# Patient Record
Sex: FEMALE | Race: WHITE | Employment: PART TIME | URBAN - METROPOLITAN AREA
[De-identification: names, ages, dates, MRNs, and addresses within clinical notes are randomized per-mention and may not be internally consistent; named-entity substitution may affect disease eponyms.]

---

## 2017-03-23 ENCOUNTER — LAB ENCOUNTER (OUTPATIENT)
Dept: LAB | Age: 27
End: 2017-03-23
Attending: INTERNAL MEDICINE
Payer: COMMERCIAL

## 2017-03-23 ENCOUNTER — OFFICE VISIT (OUTPATIENT)
Dept: FAMILY MEDICINE CLINIC | Facility: CLINIC | Age: 27
End: 2017-03-23

## 2017-03-23 VITALS
WEIGHT: 230 LBS | SYSTOLIC BLOOD PRESSURE: 120 MMHG | BODY MASS INDEX: 41.79 KG/M2 | DIASTOLIC BLOOD PRESSURE: 82 MMHG | HEART RATE: 88 BPM | HEIGHT: 62.25 IN | RESPIRATION RATE: 16 BRPM | TEMPERATURE: 99 F

## 2017-03-23 DIAGNOSIS — N92.6 IRREGULAR MENSTRUAL CYCLE: ICD-10-CM

## 2017-03-23 DIAGNOSIS — R13.10 DYSPHAGIA, UNSPECIFIED TYPE: Primary | ICD-10-CM

## 2017-03-23 DIAGNOSIS — L03.116 CELLULITIS OF LEFT LOWER EXTREMITY: ICD-10-CM

## 2017-03-23 DIAGNOSIS — Z01.89 ROUTINE LAB DRAW: ICD-10-CM

## 2017-03-23 LAB
ALBUMIN SERPL-MCNC: 3.8 G/DL (ref 3.5–4.8)
ALP LIVER SERPL-CCNC: 68 U/L (ref 37–98)
ALT SERPL-CCNC: 34 U/L (ref 14–54)
AST SERPL-CCNC: 23 U/L (ref 15–41)
BASOPHILS # BLD AUTO: 0.03 X10(3) UL (ref 0–0.1)
BASOPHILS NFR BLD AUTO: 0.3 %
BILIRUB SERPL-MCNC: 0.4 MG/DL (ref 0.1–2)
BUN BLD-MCNC: 13 MG/DL (ref 8–20)
CALCIUM BLD-MCNC: 9.3 MG/DL (ref 8.3–10.3)
CHLORIDE: 105 MMOL/L (ref 101–111)
CHOLEST SMN-MCNC: 168 MG/DL (ref ?–190)
CO2: 27 MMOL/L (ref 22–32)
CREAT BLD-MCNC: 0.85 MG/DL (ref 0.55–1.02)
EOSINOPHIL # BLD AUTO: 0.58 X10(3) UL (ref 0–0.3)
EOSINOPHIL NFR BLD AUTO: 5.8 %
ERYTHROCYTE [DISTWIDTH] IN BLOOD BY AUTOMATED COUNT: 13.6 % (ref 11.5–16)
FREE T4: 0.9 NG/DL (ref 0.9–1.8)
GLUCOSE BLD-MCNC: 83 MG/DL (ref 70–99)
HCT VFR BLD AUTO: 36.8 % (ref 34–50)
HDLC SERPL-MCNC: 46 MG/DL (ref 45–?)
HDLC SERPL: 3.65 {RATIO} (ref ?–4.44)
HGB BLD-MCNC: 12.8 G/DL (ref 12–16)
IMMATURE GRANULOCYTE COUNT: 0.04 X10(3) UL (ref 0–1)
IMMATURE GRANULOCYTE RATIO %: 0.4 %
LDLC SERPL CALC-MCNC: 85 MG/DL (ref ?–120)
LYMPHOCYTES # BLD AUTO: 3.2 X10(3) UL (ref 0.9–4)
LYMPHOCYTES NFR BLD AUTO: 32 %
M PROTEIN MFR SERPL ELPH: 7.4 G/DL (ref 6.1–8.3)
MCH RBC QN AUTO: 29.7 PG (ref 27–33.2)
MCHC RBC AUTO-ENTMCNC: 34.8 G/DL (ref 31–37)
MCV RBC AUTO: 85.4 FL (ref 81–100)
MONOCYTES # BLD AUTO: 0.63 X10(3) UL (ref 0.1–0.6)
MONOCYTES NFR BLD AUTO: 6.3 %
NEUTROPHIL ABS PRELIM: 5.52 X10 (3) UL (ref 1.3–6.7)
NEUTROPHILS # BLD AUTO: 5.52 X10(3) UL (ref 1.3–6.7)
NEUTROPHILS NFR BLD AUTO: 55.2 %
NONHDLC SERPL-MCNC: 122 MG/DL (ref ?–150)
PLATELET # BLD AUTO: 293 10(3)UL (ref 150–450)
POTASSIUM SERPL-SCNC: 4.1 MMOL/L (ref 3.6–5.1)
RBC # BLD AUTO: 4.31 X10(6)UL (ref 3.8–5.1)
RED CELL DISTRIBUTION WIDTH-SD: 42.2 FL (ref 35.1–46.3)
SODIUM SERPL-SCNC: 139 MMOL/L (ref 136–144)
TRIGLYCERIDES: 187 MG/DL (ref ?–115)
TSI SER-ACNC: 1.86 MIU/ML (ref 0.35–5.5)
VLDL: 37 MG/DL (ref 5–40)
WBC # BLD AUTO: 10 X10(3) UL (ref 4–13)

## 2017-03-23 PROCEDURE — 99204 OFFICE O/P NEW MOD 45 MIN: CPT | Performed by: PHYSICIAN ASSISTANT

## 2017-03-23 PROCEDURE — 84443 ASSAY THYROID STIM HORMONE: CPT

## 2017-03-23 PROCEDURE — 80061 LIPID PANEL: CPT

## 2017-03-23 PROCEDURE — 36415 COLL VENOUS BLD VENIPUNCTURE: CPT

## 2017-03-23 PROCEDURE — 84439 ASSAY OF FREE THYROXINE: CPT

## 2017-03-23 PROCEDURE — 85025 COMPLETE CBC W/AUTO DIFF WBC: CPT

## 2017-03-23 PROCEDURE — 80053 COMPREHEN METABOLIC PANEL: CPT

## 2017-03-23 RX ORDER — CLINDAMYCIN HYDROCHLORIDE 300 MG/1
300 CAPSULE ORAL 3 TIMES DAILY
Qty: 30 CAPSULE | Refills: 0 | Status: SHIPPED | OUTPATIENT
Start: 2017-03-23 | End: 2017-06-26

## 2017-03-23 NOTE — PATIENT INSTRUCTIONS
Thank you for choosing Bernardo Barrett PA-C at [place] [address]  To Do: Hal Pope  1. Pt to begin medications as prescribed, heat to area, benadryl and topical hydrocortisone cream  2. Get imaging and lab work done  3.  Pt to follow-up with gyneco stop treatment if problems arise, but know that our intention is that the benefits outweigh those potential risks and we strive to make you healthier and to improve your quality of life.     Referrals, and Radiology Information:    If your insurance require

## 2017-04-19 ENCOUNTER — APPOINTMENT (OUTPATIENT)
Dept: LAB | Age: 27
End: 2017-04-19
Attending: OBSTETRICS & GYNECOLOGY
Payer: COMMERCIAL

## 2017-04-19 ENCOUNTER — OFFICE VISIT (OUTPATIENT)
Dept: OBGYN CLINIC | Facility: CLINIC | Age: 27
End: 2017-04-19

## 2017-04-19 VITALS — BODY MASS INDEX: 41 KG/M2 | WEIGHT: 228 LBS

## 2017-04-19 DIAGNOSIS — N91.2 AMENORRHEA DUE TO DEPO PROVERA: ICD-10-CM

## 2017-04-19 DIAGNOSIS — N92.6 IRREGULAR MENSTRUAL CYCLE: Primary | ICD-10-CM

## 2017-04-19 DIAGNOSIS — Z31.9 PATIENT DESIRES PREGNANCY: ICD-10-CM

## 2017-04-19 DIAGNOSIS — N92.6 IRREGULAR MENSTRUAL CYCLE: ICD-10-CM

## 2017-04-19 PROCEDURE — 83001 ASSAY OF GONADOTROPIN (FSH): CPT | Performed by: OBSTETRICS & GYNECOLOGY

## 2017-04-19 PROCEDURE — 84144 ASSAY OF PROGESTERONE: CPT | Performed by: OBSTETRICS & GYNECOLOGY

## 2017-04-19 PROCEDURE — 99213 OFFICE O/P EST LOW 20 MIN: CPT | Performed by: OBSTETRICS & GYNECOLOGY

## 2017-04-19 PROCEDURE — 84146 ASSAY OF PROLACTIN: CPT | Performed by: OBSTETRICS & GYNECOLOGY

## 2017-04-19 PROCEDURE — 83002 ASSAY OF GONADOTROPIN (LH): CPT | Performed by: OBSTETRICS & GYNECOLOGY

## 2017-04-19 PROCEDURE — 36415 COLL VENOUS BLD VENIPUNCTURE: CPT | Performed by: OBSTETRICS & GYNECOLOGY

## 2017-04-19 PROCEDURE — 82670 ASSAY OF TOTAL ESTRADIOL: CPT | Performed by: OBSTETRICS & GYNECOLOGY

## 2017-04-19 NOTE — PROGRESS NOTES
Houston Ploo is a 27year old female. HPI:   Patient presents with:  Gyn Problem: Irregular Periods/Trying to Conceive      Pt took depo provera after delivery of first child. Last dose June 2016, but did not have a menses until Jan 2017.  Then had irre MD

## 2017-04-20 NOTE — PROGRESS NOTES
Quick Note:    Patient notified of normal results. Discussed progestational effects of Depo and how it can take 1 year for some women to menstruate after D/C drug.  Advised patient to observe through summer and if menses still not returning regularly to f/u

## 2017-06-26 ENCOUNTER — OFFICE VISIT (OUTPATIENT)
Dept: OBGYN CLINIC | Facility: CLINIC | Age: 27
End: 2017-06-26

## 2017-06-26 VITALS
BODY MASS INDEX: 40.55 KG/M2 | HEIGHT: 62.5 IN | SYSTOLIC BLOOD PRESSURE: 110 MMHG | WEIGHT: 226 LBS | DIASTOLIC BLOOD PRESSURE: 74 MMHG | HEART RATE: 87 BPM

## 2017-06-26 DIAGNOSIS — Z12.4 CERVICAL CANCER SCREENING: ICD-10-CM

## 2017-06-26 DIAGNOSIS — N92.6 IRREGULAR MENSES: ICD-10-CM

## 2017-06-26 DIAGNOSIS — Z01.419 WELL FEMALE EXAM WITH ROUTINE GYNECOLOGICAL EXAM: Primary | ICD-10-CM

## 2017-06-26 PROCEDURE — 88175 CYTOPATH C/V AUTO FLUID REDO: CPT | Performed by: OBSTETRICS & GYNECOLOGY

## 2017-06-26 PROCEDURE — 99395 PREV VISIT EST AGE 18-39: CPT | Performed by: OBSTETRICS & GYNECOLOGY

## 2017-06-26 PROCEDURE — 81025 URINE PREGNANCY TEST: CPT | Performed by: OBSTETRICS & GYNECOLOGY

## 2017-06-26 NOTE — PROGRESS NOTES
GYN H&P     2017  6:26 PM    CC: Patient is here for annual    HPI: patient is a 27year old  here for her annual gyn exam.   She has no complaints. Menses are regular, except late this month. Requests ucg.  Denies any pelvic pain or irregular v epistaxis, throat pain or sore throat  Respiratory: denies SOB, dyspnea, cough or wheezing  Cardiovascular: denies chest pain, palpitations, exercise intolerance   GI: denies abdominal pain, diarrhea, constipation  : no complaints, denies dysuria, increa Rectal: deferred  EXTREMITIES:  non tender without edema        A/P: Patient is 27year old female with no complaints. Here for well woman exam.   1. Well female exam with routine gynecological exam    2. Cervical cancer screening    3.  Irregular menses

## 2017-07-15 ENCOUNTER — HOSPITAL ENCOUNTER (EMERGENCY)
Age: 27
Discharge: HOME | End: 2017-07-15
Attending: EMERGENCY MEDICINE
Payer: COMMERCIAL

## 2017-07-15 ENCOUNTER — APPOINTMENT (OUTPATIENT)
Dept: GENERAL RADIOLOGY | Age: 27
End: 2017-07-15
Attending: PHYSICIAN ASSISTANT
Payer: COMMERCIAL

## 2017-07-15 VITALS
HEART RATE: 105 BPM | RESPIRATION RATE: 22 BRPM | HEIGHT: 63 IN | TEMPERATURE: 99 F | OXYGEN SATURATION: 99 % | SYSTOLIC BLOOD PRESSURE: 129 MMHG | WEIGHT: 220 LBS | DIASTOLIC BLOOD PRESSURE: 68 MMHG | BODY MASS INDEX: 38.98 KG/M2

## 2017-07-15 DIAGNOSIS — J06.9 UPPER RESPIRATORY TRACT INFECTION, UNSPECIFIED TYPE: Primary | ICD-10-CM

## 2017-07-15 PROCEDURE — 71020 XR CHEST PA + LAT CHEST (CPT=71020): CPT | Performed by: PHYSICIAN ASSISTANT

## 2017-07-15 PROCEDURE — 87081 CULTURE SCREEN ONLY: CPT | Performed by: PHYSICIAN ASSISTANT

## 2017-07-15 PROCEDURE — 87430 STREP A AG IA: CPT | Performed by: PHYSICIAN ASSISTANT

## 2017-07-15 PROCEDURE — 99283 EMERGENCY DEPT VISIT LOW MDM: CPT

## 2017-07-15 PROCEDURE — 96372 THER/PROPH/DIAG INJ SC/IM: CPT

## 2017-07-15 RX ORDER — DEXAMETHASONE SODIUM PHOSPHATE 4 MG/ML
8 VIAL (ML) INJECTION ONCE
Status: COMPLETED | OUTPATIENT
Start: 2017-07-15 | End: 2017-07-15

## 2017-07-15 RX ORDER — ACETAMINOPHEN 500 MG
1000 TABLET ORAL ONCE
Status: COMPLETED | OUTPATIENT
Start: 2017-07-15 | End: 2017-07-15

## 2017-07-16 NOTE — ED PROVIDER NOTES
Patient Seen in: [facility] In [place]    History   Patient presents with:  Sore Throat    Stated Complaint: PT states she is having a sore throat.     HPI    68-year-old female with a history of asthma presents to the ER for evaluatio normal.   Mouth/Throat: Mucous membranes are not dry. No oropharyngeal exudate or posterior oropharyngeal erythema. Eyes: Conjunctivae and EOM are normal. Right eye exhibits no discharge and no exudate. Left eye exhibits no discharge and no exudate.    Ne supportive care at home. Follow-up with primary care in 2-3 days.       Disposition and Plan     Clinical Impression:  Upper respiratory tract infection, unspecified type  (primary encounter diagnosis)    Disposition:  Discharge    Follow-up:  Brigido Kessler

## 2017-07-16 NOTE — ED PROVIDER NOTES
I reviewed that chart and discussed the case. I have examined the patient and noted H EENT exam is normal.      I agree with the following clinical impression(s):  Upper respiratory tract infection, unspecified type  (primary encounter diagnosis).     I ag

## 2017-10-04 ENCOUNTER — APPOINTMENT (OUTPATIENT)
Dept: GENERAL RADIOLOGY | Age: 27
End: 2017-10-04
Attending: NURSE PRACTITIONER
Payer: COMMERCIAL

## 2017-10-04 ENCOUNTER — HOSPITAL ENCOUNTER (EMERGENCY)
Age: 27
Discharge: HOME | End: 2017-10-04
Payer: COMMERCIAL

## 2017-10-04 VITALS
WEIGHT: 235 LBS | HEIGHT: 63 IN | SYSTOLIC BLOOD PRESSURE: 135 MMHG | HEART RATE: 100 BPM | RESPIRATION RATE: 18 BRPM | TEMPERATURE: 98 F | DIASTOLIC BLOOD PRESSURE: 74 MMHG | BODY MASS INDEX: 41.64 KG/M2 | OXYGEN SATURATION: 100 %

## 2017-10-04 DIAGNOSIS — S93.402A SPRAIN OF LEFT ANKLE, UNSPECIFIED LIGAMENT, INITIAL ENCOUNTER: Primary | ICD-10-CM

## 2017-10-04 PROCEDURE — 99283 EMERGENCY DEPT VISIT LOW MDM: CPT

## 2017-10-04 PROCEDURE — 73610 X-RAY EXAM OF ANKLE: CPT | Performed by: NURSE PRACTITIONER

## 2017-10-04 RX ORDER — IBUPROFEN 600 MG/1
600 TABLET ORAL ONCE
Status: COMPLETED | OUTPATIENT
Start: 2017-10-04 | End: 2017-10-04

## 2017-10-04 NOTE — ED PROVIDER NOTES
Patient had an ankle injury while on duty at work. She complains of pain over the lateral malleolus. She has eczema on her toes with some redness and scaling. It is very itchy.     On inspection, there is what appears to be fungal infection on the second

## 2017-10-04 NOTE — ED PROVIDER NOTES
Patient Seen in: THE [facility] In [place]    History   Patient presents with:  Lower Extremity Injury (musculoskeletal)    Stated Complaint: Left ankle injury    61-year-old female who presents to the emergency room with complaints of le All other systems reviewed and negative except as noted above. PSFH elements reviewed from today and agreed except as otherwise stated in HPI.     Physical Exam   ED Triage Vitals [10/04/17 1113]  BP: 135/74  Pulse: 100  Resp: 18  Temp: 98.4 °F (36.9 °C) CONCLUSION:  No acute findings.     Dictated by: Brain Roman MD on 10/04/2017 at 11:43     Approved by: Brain Roman MD          We will give patient crutches, and a ankle splint  ============================================================  ED Cou

## 2017-10-08 ENCOUNTER — HOSPITAL ENCOUNTER (EMERGENCY)
Age: 27
Discharge: HOME | End: 2017-10-08
Attending: EMERGENCY MEDICINE
Payer: COMMERCIAL

## 2017-10-08 VITALS
RESPIRATION RATE: 18 BRPM | BODY MASS INDEX: 39.87 KG/M2 | DIASTOLIC BLOOD PRESSURE: 65 MMHG | HEART RATE: 102 BPM | TEMPERATURE: 99 F | OXYGEN SATURATION: 99 % | HEIGHT: 63 IN | SYSTOLIC BLOOD PRESSURE: 111 MMHG | WEIGHT: 225 LBS

## 2017-10-08 DIAGNOSIS — R11.2 NAUSEA VOMITING AND DIARRHEA: Primary | ICD-10-CM

## 2017-10-08 DIAGNOSIS — R19.7 NAUSEA VOMITING AND DIARRHEA: Primary | ICD-10-CM

## 2017-10-08 PROCEDURE — 80053 COMPREHEN METABOLIC PANEL: CPT | Performed by: EMERGENCY MEDICINE

## 2017-10-08 PROCEDURE — 81025 URINE PREGNANCY TEST: CPT

## 2017-10-08 PROCEDURE — 85025 COMPLETE CBC W/AUTO DIFF WBC: CPT | Performed by: EMERGENCY MEDICINE

## 2017-10-08 PROCEDURE — 96361 HYDRATE IV INFUSION ADD-ON: CPT

## 2017-10-08 PROCEDURE — 87086 URINE CULTURE/COLONY COUNT: CPT | Performed by: EMERGENCY MEDICINE

## 2017-10-08 PROCEDURE — 99284 EMERGENCY DEPT VISIT MOD MDM: CPT

## 2017-10-08 PROCEDURE — 81001 URINALYSIS AUTO W/SCOPE: CPT | Performed by: EMERGENCY MEDICINE

## 2017-10-08 PROCEDURE — 96374 THER/PROPH/DIAG INJ IV PUSH: CPT

## 2017-10-08 RX ORDER — DICYCLOMINE HCL 20 MG
20 TABLET ORAL 4 TIMES DAILY PRN
Qty: 20 TABLET | Refills: 0 | Status: SHIPPED | OUTPATIENT
Start: 2017-10-08 | End: 2018-05-25

## 2017-10-08 RX ORDER — IBUPROFEN 400 MG/1
400 TABLET ORAL EVERY 6 HOURS PRN
COMMUNITY
End: 2018-05-25

## 2017-10-08 RX ORDER — ONDANSETRON 2 MG/ML
4 INJECTION INTRAMUSCULAR; INTRAVENOUS ONCE
Status: COMPLETED | OUTPATIENT
Start: 2017-10-08 | End: 2017-10-08

## 2017-10-08 RX ORDER — ONDANSETRON 4 MG/1
4 TABLET, ORALLY DISINTEGRATING ORAL EVERY 4 HOURS PRN
Qty: 10 TABLET | Refills: 0 | Status: SHIPPED | OUTPATIENT
Start: 2017-10-08 | End: 2017-10-15

## 2017-10-08 NOTE — ED PROVIDER NOTES
Patient Seen in: THE [facility] In [place]    History   Patient presents with:  Nausea/Vomiting/Diarrhea (gastrointestinal)  Dizziness (neurologic)    Stated Complaint: VOMITING/DIARRHEA/DIZZINESS    HPI    Patient is a 17-year-old female pupils equal round reactive to light. Mouth normal, neck supple, no meningismus. LUNGS: Lungs clear to auscultation bilaterally. CARDIOVASCULAR: + S1-S2, regular rate and rhythm, no murmurs. BACK: No CVA tenderness, no midline bony tenderness.   ABDOMEN ROUTINE       ============================================================  ED Course  ------------------------------------------------------------  MDM   Patient was given IV fluids as well as Toradol and Zofran. Patient felt much improved.   Patient was

## 2018-05-16 ENCOUNTER — TELEPHONE (OUTPATIENT)
Dept: OBGYN CLINIC | Facility: CLINIC | Age: 28
End: 2018-05-16

## 2018-05-16 NOTE — TELEPHONE ENCOUNTER
Patient called back to reschedule NOB appt. Her LMP was 01/23/18. She had + UPT. Denies any questions or concerns. Patient previously did not have vehicle. Now, she does. Appointment scheduled. To call with any further questions or concerns.

## 2018-05-16 NOTE — TELEPHONE ENCOUNTER
PT calling and had an appt scheduled in March but had to cancel due to no transportation    Pt now has a car    Pt is calling back to schedule her NEW OB appt again    Please call

## 2018-05-21 ENCOUNTER — HOSPITAL ENCOUNTER (EMERGENCY)
Age: 28
Discharge: HOME | End: 2018-05-21
Attending: EMERGENCY MEDICINE
Payer: COMMERCIAL

## 2018-05-21 ENCOUNTER — APPOINTMENT (OUTPATIENT)
Dept: ULTRASOUND IMAGING | Age: 28
End: 2018-05-21
Attending: EMERGENCY MEDICINE
Payer: COMMERCIAL

## 2018-05-21 VITALS
RESPIRATION RATE: 16 BRPM | WEIGHT: 225 LBS | HEART RATE: 90 BPM | SYSTOLIC BLOOD PRESSURE: 114 MMHG | HEIGHT: 63 IN | BODY MASS INDEX: 39.87 KG/M2 | TEMPERATURE: 99 F | OXYGEN SATURATION: 100 % | DIASTOLIC BLOOD PRESSURE: 55 MMHG

## 2018-05-21 DIAGNOSIS — Z3A.16 16 WEEKS GESTATION OF PREGNANCY: ICD-10-CM

## 2018-05-21 DIAGNOSIS — R10.9 FLANK PAIN: Primary | ICD-10-CM

## 2018-05-21 DIAGNOSIS — K80.20 CALCULUS OF GALLBLADDER WITHOUT CHOLECYSTITIS WITHOUT OBSTRUCTION: ICD-10-CM

## 2018-05-21 PROCEDURE — 87086 URINE CULTURE/COLONY COUNT: CPT | Performed by: EMERGENCY MEDICINE

## 2018-05-21 PROCEDURE — 85025 COMPLETE CBC W/AUTO DIFF WBC: CPT | Performed by: EMERGENCY MEDICINE

## 2018-05-21 PROCEDURE — 76815 OB US LIMITED FETUS(S): CPT | Performed by: EMERGENCY MEDICINE

## 2018-05-21 PROCEDURE — 80053 COMPREHEN METABOLIC PANEL: CPT | Performed by: EMERGENCY MEDICINE

## 2018-05-21 PROCEDURE — 76700 US EXAM ABDOM COMPLETE: CPT | Performed by: EMERGENCY MEDICINE

## 2018-05-21 PROCEDURE — 81003 URINALYSIS AUTO W/O SCOPE: CPT | Performed by: EMERGENCY MEDICINE

## 2018-05-21 PROCEDURE — 96360 HYDRATION IV INFUSION INIT: CPT

## 2018-05-21 PROCEDURE — 99284 EMERGENCY DEPT VISIT MOD MDM: CPT

## 2018-05-21 PROCEDURE — 99285 EMERGENCY DEPT VISIT HI MDM: CPT

## 2018-05-21 RX ORDER — POTASSIUM CHLORIDE 750 MG/1
10 TABLET, EXTENDED RELEASE ORAL ONCE
Status: COMPLETED | OUTPATIENT
Start: 2018-05-21 | End: 2018-05-21

## 2018-05-21 NOTE — ED PROVIDER NOTES
Patient Seen in: [facility] In [place]    History   Patient presents with:  Pregnancy Issues (gynecologic)  Back Pain (musculoskeletal)  Bleeding (hematologic)    Stated Complaint: UPPER RIGHT SIDED BACK PAIN/HEMATURIA/16WEEKS PREG (A white, conjunctiva pink and moist.  Lids and lashes are normal.  Nose: Unremarkable  Back: Unremarkable on inspection without soft tissue swelling or bruising noted in the right flank area.   There is no CVA tenderness  Lungs: Clear to auscultation bilatera 2016  ------------------------------------------------------------      MDM   Patient has flank pain with possible \"dots\" of blood. No heavy vaginal bleeding.     I am hesitant to perform a speculum examination and a second trimester pregnant patient   p visit          Medications Prescribed:  Current Discharge Medication List

## 2018-05-21 NOTE — ED INITIAL ASSESSMENT (HPI)
C/O RIGHT MID BACK PAIN X2 DAYS  HEMATURIA STARTING TODAY  16 WEEKS GESTATION   G 3 P 1 A 1  LMP 1/23/18  ERNESTINA 10/31/18

## 2018-05-22 NOTE — TELEPHONE ENCOUNTER
Tried to contact patient. She was seen in ER on 05/21/18. NEW OB scheduled for 06/08/18. Per Dr. Marlene Gavin patient should be seen for ER f/u sooner. Unable to leave  b/c mailbox was full.

## 2018-05-25 ENCOUNTER — LAB ENCOUNTER (OUTPATIENT)
Dept: LAB | Age: 28
End: 2018-05-25
Attending: OBSTETRICS & GYNECOLOGY
Payer: COMMERCIAL

## 2018-05-25 ENCOUNTER — OFFICE VISIT (OUTPATIENT)
Dept: OBGYN CLINIC | Facility: CLINIC | Age: 28
End: 2018-05-25

## 2018-05-25 VITALS
SYSTOLIC BLOOD PRESSURE: 110 MMHG | DIASTOLIC BLOOD PRESSURE: 58 MMHG | BODY MASS INDEX: 36.6 KG/M2 | TEMPERATURE: 98 F | WEIGHT: 204 LBS | HEIGHT: 62.5 IN | HEART RATE: 105 BPM

## 2018-05-25 DIAGNOSIS — N93.9 VAGINAL SPOTTING: Primary | ICD-10-CM

## 2018-05-25 DIAGNOSIS — Z34.92 ENCOUNTER FOR SUPERVISION OF NORMAL PREGNANCY IN SECOND TRIMESTER, UNSPECIFIED GRAVIDITY: ICD-10-CM

## 2018-05-25 DIAGNOSIS — M54.9 ACUTE RIGHT-SIDED BACK PAIN, UNSPECIFIED BACK LOCATION: ICD-10-CM

## 2018-05-25 PROCEDURE — 87086 URINE CULTURE/COLONY COUNT: CPT | Performed by: OBSTETRICS & GYNECOLOGY

## 2018-05-25 PROCEDURE — 86900 BLOOD TYPING SEROLOGIC ABO: CPT | Performed by: OBSTETRICS & GYNECOLOGY

## 2018-05-25 PROCEDURE — 87491 CHLMYD TRACH DNA AMP PROBE: CPT | Performed by: OBSTETRICS & GYNECOLOGY

## 2018-05-25 PROCEDURE — 86850 RBC ANTIBODY SCREEN: CPT | Performed by: OBSTETRICS & GYNECOLOGY

## 2018-05-25 PROCEDURE — 81002 URINALYSIS NONAUTO W/O SCOPE: CPT | Performed by: OBSTETRICS & GYNECOLOGY

## 2018-05-25 PROCEDURE — 36415 COLL VENOUS BLD VENIPUNCTURE: CPT | Performed by: OBSTETRICS & GYNECOLOGY

## 2018-05-25 PROCEDURE — 86901 BLOOD TYPING SEROLOGIC RH(D): CPT | Performed by: OBSTETRICS & GYNECOLOGY

## 2018-05-25 PROCEDURE — 99214 OFFICE O/P EST MOD 30 MIN: CPT | Performed by: OBSTETRICS & GYNECOLOGY

## 2018-05-25 PROCEDURE — 87389 HIV-1 AG W/HIV-1&-2 AB AG IA: CPT | Performed by: OBSTETRICS & GYNECOLOGY

## 2018-05-25 PROCEDURE — 87660 TRICHOMONAS VAGIN DIR PROBE: CPT | Performed by: OBSTETRICS & GYNECOLOGY

## 2018-05-25 PROCEDURE — 85025 COMPLETE CBC W/AUTO DIFF WBC: CPT | Performed by: OBSTETRICS & GYNECOLOGY

## 2018-05-25 PROCEDURE — 86780 TREPONEMA PALLIDUM: CPT | Performed by: OBSTETRICS & GYNECOLOGY

## 2018-05-25 PROCEDURE — 87480 CANDIDA DNA DIR PROBE: CPT | Performed by: OBSTETRICS & GYNECOLOGY

## 2018-05-25 PROCEDURE — 87591 N.GONORRHOEAE DNA AMP PROB: CPT | Performed by: OBSTETRICS & GYNECOLOGY

## 2018-05-25 PROCEDURE — 87510 GARDNER VAG DNA DIR PROBE: CPT | Performed by: OBSTETRICS & GYNECOLOGY

## 2018-05-25 PROCEDURE — 87340 HEPATITIS B SURFACE AG IA: CPT | Performed by: OBSTETRICS & GYNECOLOGY

## 2018-05-25 NOTE — PATIENT INSTRUCTIONS
Please report to the emergency department if you experience chills, fever, nausea, vomiting, pain with urination, blood in urine and/or increased frequency of urination.    Please keep your appointment on 06/08/18 to establish prenatal care

## 2018-05-25 NOTE — PROGRESS NOTES
[facility]  Obstetrics and Gynecology  Follow Up Progress Note    Subjective:     Houston Polo is a 24year old  female who presents for follow up from ED.  She had vaginal spotting that started on Monday morning and last noted prior to ED Ref Range & Units 5/21/18  6:02 PM    Urine Color Yellow Yellow    Clarity Urine Clear Turbid     Spec Gravity 1.001 - 1.030 1.020    Glucose Urine Negative mg/dl Negative    Bilirubin Urine Negative Negative    Ketones Urine Negative mg/dL Negative    Blo Mattie Rey is a 24year old  female who presents for follow up ED visit     Patient Active Problem List:     Encounter for supervision of normal first pregnancy in third trimester     Obesity affecting pregnancy, antepartum     Pregnancy     [place]

## 2018-12-11 ENCOUNTER — APPOINTMENT (OUTPATIENT)
Dept: ULTRASOUND IMAGING | Age: 28
End: 2018-12-11
Attending: EMERGENCY MEDICINE
Payer: COMMERCIAL

## 2018-12-11 ENCOUNTER — HOSPITAL ENCOUNTER (EMERGENCY)
Age: 28
Discharge: HOME | End: 2018-12-11
Attending: EMERGENCY MEDICINE
Payer: COMMERCIAL

## 2018-12-11 VITALS
BODY MASS INDEX: 35.08 KG/M2 | RESPIRATION RATE: 16 BRPM | HEART RATE: 88 BPM | TEMPERATURE: 98 F | DIASTOLIC BLOOD PRESSURE: 82 MMHG | WEIGHT: 198 LBS | OXYGEN SATURATION: 100 % | HEIGHT: 63 IN | SYSTOLIC BLOOD PRESSURE: 110 MMHG

## 2018-12-11 DIAGNOSIS — K64.9 HEMORRHOIDS, UNSPECIFIED HEMORRHOID TYPE: ICD-10-CM

## 2018-12-11 DIAGNOSIS — S30.1XXA CONTUSION OF ABDOMINAL WALL, INITIAL ENCOUNTER: Primary | ICD-10-CM

## 2018-12-11 PROCEDURE — 99285 EMERGENCY DEPT VISIT HI MDM: CPT

## 2018-12-11 PROCEDURE — 99284 EMERGENCY DEPT VISIT MOD MDM: CPT

## 2018-12-11 PROCEDURE — 80048 BASIC METABOLIC PNL TOTAL CA: CPT | Performed by: EMERGENCY MEDICINE

## 2018-12-11 PROCEDURE — 85025 COMPLETE CBC W/AUTO DIFF WBC: CPT | Performed by: EMERGENCY MEDICINE

## 2018-12-11 PROCEDURE — 76815 OB US LIMITED FETUS(S): CPT | Performed by: EMERGENCY MEDICINE

## 2018-12-11 PROCEDURE — 86900 BLOOD TYPING SEROLOGIC ABO: CPT | Performed by: EMERGENCY MEDICINE

## 2018-12-11 PROCEDURE — 86901 BLOOD TYPING SEROLOGIC RH(D): CPT | Performed by: EMERGENCY MEDICINE

## 2018-12-11 PROCEDURE — 96360 HYDRATION IV INFUSION INIT: CPT

## 2018-12-11 RX ORDER — SODIUM CHLORIDE 9 MG/ML
125 INJECTION, SOLUTION INTRAVENOUS CONTINUOUS
Status: DISCONTINUED | OUTPATIENT
Start: 2018-12-11 | End: 2018-12-11

## 2018-12-11 RX ORDER — POTASSIUM CHLORIDE 750 MG/1
10 TABLET, EXTENDED RELEASE ORAL ONCE
Status: COMPLETED | OUTPATIENT
Start: 2018-12-11 | End: 2018-12-11

## 2018-12-11 NOTE — ED INITIAL ASSESSMENT (HPI)
Pt stated that fell earlier today while at the gas station, NO LOC; Pt also stated that she had a bowel movement and noticed blood in the toilet.

## 2018-12-11 NOTE — ED PROVIDER NOTES
Patient Seen in: [facility] In [place]    History   Patient presents with:  Trauma (cardiovascular, musculoskeletal)  Eval-G (gynecologic)  Pregnancy Issues (gynecologic)  Bleeding (hematologic)    Stated Complaint: STATES 16 WEEKS MN Vitals [12/11/18 1726]   /90   Pulse 94   Resp 16   Temp 98.1 °F (36.7 °C)   Temp src Oral   SpO2 100 %   O2 Device None (Room air)       Current:/82   Pulse 88   Temp 98.1 °F (36.7 °C) (Oral)   Resp 16   Ht 160 cm (5' 3\")   Wt 89.8 kg   LMP 0 tests on the individual orders. 82 Katie Martinez (BLOOD TYPE)                MDM   Patient believes her blood type is Rh+. Her abdominal examination is benign with no tenderness over the liver or spleen. Minimally tender diffusely across lower abdomen.   Rectal ex

## 2018-12-18 ENCOUNTER — HOSPITAL ENCOUNTER (EMERGENCY)
Age: 28
Discharge: HOME | End: 2018-12-18
Attending: EMERGENCY MEDICINE
Payer: COMMERCIAL

## 2018-12-18 VITALS
HEIGHT: 63 IN | BODY MASS INDEX: 34.55 KG/M2 | DIASTOLIC BLOOD PRESSURE: 66 MMHG | OXYGEN SATURATION: 100 % | HEART RATE: 76 BPM | TEMPERATURE: 98 F | WEIGHT: 195 LBS | RESPIRATION RATE: 20 BRPM | SYSTOLIC BLOOD PRESSURE: 135 MMHG

## 2018-12-18 DIAGNOSIS — O23.41 UTI (URINARY TRACT INFECTION) DURING PREGNANCY, FIRST TRIMESTER: Primary | ICD-10-CM

## 2018-12-18 PROCEDURE — 81001 URINALYSIS AUTO W/SCOPE: CPT | Performed by: EMERGENCY MEDICINE

## 2018-12-18 PROCEDURE — 87086 URINE CULTURE/COLONY COUNT: CPT | Performed by: EMERGENCY MEDICINE

## 2018-12-18 PROCEDURE — 99283 EMERGENCY DEPT VISIT LOW MDM: CPT

## 2018-12-18 RX ORDER — CEPHALEXIN 500 MG/1
500 CAPSULE ORAL 4 TIMES DAILY
Qty: 28 CAPSULE | Refills: 0 | Status: SHIPPED | OUTPATIENT
Start: 2018-12-18 | End: 2018-12-25

## 2018-12-18 NOTE — ED PROVIDER NOTES
Patient Seen in: [facility] In [place]    History   Patient presents with:  Urinary Symptoms (urologic)    Stated Complaint: UTI    HPI    61-year-old white female who presents emergency room today for complaint of urinary tract infec Heart is regular rate and rhythm without murmur gallop or rub    Abdomen is soft nondistended nontender to deep palpation there is no rebound or guarding noted no hepatosplenomegaly is noted. No masses are noted. No hernias are palpated.     Extremity exa

## 2019-01-20 ENCOUNTER — HOSPITAL ENCOUNTER (EMERGENCY)
Facility: HOSPITAL | Age: 29
Discharge: HOME | End: 2019-01-20
Attending: EMERGENCY MEDICINE
Payer: MEDICAID

## 2019-01-20 VITALS
DIASTOLIC BLOOD PRESSURE: 75 MMHG | OXYGEN SATURATION: 100 % | HEIGHT: 64 IN | RESPIRATION RATE: 19 BRPM | BODY MASS INDEX: 34.66 KG/M2 | TEMPERATURE: 98 F | WEIGHT: 203 LBS | HEART RATE: 93 BPM | SYSTOLIC BLOOD PRESSURE: 116 MMHG

## 2019-01-20 DIAGNOSIS — R51.9 ACUTE NONINTRACTABLE HEADACHE, UNSPECIFIED HEADACHE TYPE: Primary | ICD-10-CM

## 2019-01-20 LAB
ALBUMIN SERPL-MCNC: 3.5 G/DL (ref 3.1–4.5)
ALBUMIN/GLOB SERPL: 0.9 {RATIO} (ref 1–2)
ALP LIVER SERPL-CCNC: 69 U/L (ref 37–98)
ALT SERPL-CCNC: 21 U/L (ref 14–54)
ANION GAP SERPL CALC-SCNC: 7 MMOL/L (ref 0–18)
AST SERPL-CCNC: 14 U/L (ref 15–41)
BASOPHILS # BLD AUTO: 0.02 X10(3) UL (ref 0–0.1)
BASOPHILS NFR BLD AUTO: 0.2 %
BILIRUB SERPL-MCNC: 0.3 MG/DL (ref 0.1–2)
BILIRUB UR QL STRIP.AUTO: NEGATIVE
BUN BLD-MCNC: 11 MG/DL (ref 8–20)
BUN/CREAT SERPL: 13.8 (ref 10–20)
CALCIUM BLD-MCNC: 9.2 MG/DL (ref 8.3–10.3)
CHLORIDE SERPL-SCNC: 108 MMOL/L (ref 101–111)
CO2 SERPL-SCNC: 25 MMOL/L (ref 22–32)
COLOR UR AUTO: YELLOW
CREAT BLD-MCNC: 0.8 MG/DL (ref 0.55–1.02)
EOSINOPHIL # BLD AUTO: 0.18 X10(3) UL (ref 0–0.3)
EOSINOPHIL NFR BLD AUTO: 1.5 %
ERYTHROCYTE [DISTWIDTH] IN BLOOD BY AUTOMATED COUNT: 13.7 % (ref 11.5–16)
GLOBULIN PLAS-MCNC: 3.7 G/DL (ref 2.8–4.4)
GLUCOSE BLD-MCNC: 104 MG/DL (ref 70–99)
GLUCOSE UR STRIP.AUTO-MCNC: NEGATIVE MG/DL
HCT VFR BLD AUTO: 36.2 % (ref 34–50)
HGB BLD-MCNC: 13 G/DL (ref 12–16)
IMMATURE GRANULOCYTE COUNT: 0.04 X10(3) UL (ref 0–1)
IMMATURE GRANULOCYTE RATIO %: 0.3 %
LEUKOCYTE ESTERASE UR QL STRIP.AUTO: NEGATIVE
LYMPHOCYTES # BLD AUTO: 3.03 X10(3) UL (ref 0.9–4)
LYMPHOCYTES NFR BLD AUTO: 25.3 %
M PROTEIN MFR SERPL ELPH: 7.2 G/DL (ref 6.4–8.2)
MCH RBC QN AUTO: 32.2 PG (ref 27–33.2)
MCHC RBC AUTO-ENTMCNC: 35.9 G/DL (ref 31–37)
MCV RBC AUTO: 89.6 FL (ref 81–100)
MONOCYTES # BLD AUTO: 0.68 X10(3) UL (ref 0.1–1)
MONOCYTES NFR BLD AUTO: 5.7 %
NEUTROPHIL ABS PRELIM: 8.03 X10 (3) UL (ref 1.3–6.7)
NEUTROPHILS # BLD AUTO: 8.03 X10(3) UL (ref 1.3–6.7)
NEUTROPHILS NFR BLD AUTO: 67 %
NITRITE UR QL STRIP.AUTO: NEGATIVE
OSMOLALITY SERPL CALC.SUM OF ELEC: 290 MOSM/KG (ref 275–295)
PH UR STRIP.AUTO: 6 [PH] (ref 4.5–8)
PLATELET # BLD AUTO: 255 10(3)UL (ref 150–450)
POTASSIUM SERPL-SCNC: 3.2 MMOL/L (ref 3.6–5.1)
PROT UR STRIP.AUTO-MCNC: NEGATIVE MG/DL
RBC # BLD AUTO: 4.04 X10(6)UL (ref 3.8–5.1)
RBC UR QL AUTO: NEGATIVE
RED CELL DISTRIBUTION WIDTH-SD: 44.9 FL (ref 35.1–46.3)
SODIUM SERPL-SCNC: 140 MMOL/L (ref 136–144)
SP GR UR STRIP.AUTO: 1.01 (ref 1–1.03)
UROBILINOGEN UR STRIP.AUTO-MCNC: <2 MG/DL
WBC # BLD AUTO: 12 X10(3) UL (ref 4–13)

## 2019-01-20 PROCEDURE — 96375 TX/PRO/DX INJ NEW DRUG ADDON: CPT | Performed by: EMERGENCY MEDICINE

## 2019-01-20 PROCEDURE — 96361 HYDRATE IV INFUSION ADD-ON: CPT | Performed by: EMERGENCY MEDICINE

## 2019-01-20 PROCEDURE — 81003 URINALYSIS AUTO W/O SCOPE: CPT | Performed by: EMERGENCY MEDICINE

## 2019-01-20 PROCEDURE — 85025 COMPLETE CBC W/AUTO DIFF WBC: CPT | Performed by: EMERGENCY MEDICINE

## 2019-01-20 PROCEDURE — 80053 COMPREHEN METABOLIC PANEL: CPT | Performed by: EMERGENCY MEDICINE

## 2019-01-20 PROCEDURE — 99284 EMERGENCY DEPT VISIT MOD MDM: CPT | Performed by: EMERGENCY MEDICINE

## 2019-01-20 PROCEDURE — 96374 THER/PROPH/DIAG INJ IV PUSH: CPT | Performed by: EMERGENCY MEDICINE

## 2019-01-20 RX ORDER — DEXAMETHASONE SODIUM PHOSPHATE 4 MG/ML
10 VIAL (ML) INJECTION ONCE
Status: COMPLETED | OUTPATIENT
Start: 2019-01-20 | End: 2019-01-20

## 2019-01-20 RX ORDER — HYDROCODONE BITARTRATE AND ACETAMINOPHEN 5; 325 MG/1; MG/1
1-2 TABLET ORAL EVERY 4 HOURS PRN
Qty: 10 TABLET | Refills: 0 | Status: SHIPPED | OUTPATIENT
Start: 2019-01-20 | End: 2019-01-27

## 2019-01-20 RX ORDER — METOCLOPRAMIDE HYDROCHLORIDE 5 MG/ML
10 INJECTION INTRAMUSCULAR; INTRAVENOUS ONCE
Status: COMPLETED | OUTPATIENT
Start: 2019-01-20 | End: 2019-01-20

## 2019-01-20 RX ORDER — METOCLOPRAMIDE 10 MG/1
10 TABLET ORAL 3 TIMES DAILY PRN
Qty: 20 TABLET | Refills: 0 | Status: SHIPPED | OUTPATIENT
Start: 2019-01-20 | End: 2019-02-19

## 2019-01-20 RX ORDER — DIPHENHYDRAMINE HYDROCHLORIDE 50 MG/ML
50 INJECTION INTRAMUSCULAR; INTRAVENOUS ONCE
Status: COMPLETED | OUTPATIENT
Start: 2019-01-20 | End: 2019-01-20

## 2019-01-21 NOTE — ED INITIAL ASSESSMENT (HPI)
Patient sts migraine for the past few days- today feeling dizzy and nauseated. Pt sts 5 months pregnant. Also having stomach pains umbilical area. Denies vaginal bleeding.

## 2019-01-21 NOTE — ED PROVIDER NOTES
Patient Seen in: [facility] Emergency Department    History   Patient presents with:  Headache (neurologic)  Nausea/Vomiting/Diarrhea (gastrointestinal)    Stated Complaint: Migraine headache, nausea and vomiting, 5 months pregnant    HPI    28-year (36.9 °C) (Temporal)   Resp 17   Ht 162.6 cm (5' 4\")   Wt 92.1 kg   LMP 08/11/2018   SpO2 99%   BMI 34.84 kg/m²         Physical Exam    General appearance: This is a young adult female lying on a gurney. Vital signs were reviewed per nurse's notes.   MARCELLA RAINBOW DRAW GOLD   RAINBOW DRAW BLUE   RAINBOW DRAW LAVENDER   RAINBOW DRAW LIGHT GREEN          Intravenous access was obtained and the patient was treated with IV fluids, Reglan, Benadryl and Decadron. Initial blood pressure was 151/95.   CBC, chemi

## 2019-01-31 ENCOUNTER — ANESTHESIA (OUTPATIENT)
Dept: SURGERY | Facility: HOSPITAL | Age: 29
End: 2019-01-31

## 2019-01-31 ENCOUNTER — ANESTHESIA EVENT (OUTPATIENT)
Dept: SURGERY | Facility: HOSPITAL | Age: 29
End: 2019-01-31

## 2019-01-31 PROBLEM — Z34.90 PREGNANCY (HCC): Status: ACTIVE | Noted: 2019-01-31

## 2019-01-31 PROBLEM — Z34.90 PREGNANCY: Status: ACTIVE | Noted: 2019-01-31

## 2019-01-31 PROCEDURE — 0DTJ4ZZ RESECTION OF APPENDIX, PERCUTANEOUS ENDOSCOPIC APPROACH: ICD-10-PCS | Performed by: SURGERY

## 2019-01-31 PROCEDURE — 99254 IP/OBS CNSLTJ NEW/EST MOD 60: CPT | Performed by: SURGERY

## 2019-01-31 RX ORDER — LABETALOL HYDROCHLORIDE 5 MG/ML
5 INJECTION, SOLUTION INTRAVENOUS EVERY 5 MIN PRN
Status: DISCONTINUED | OUTPATIENT
Start: 2019-01-31 | End: 2019-02-01 | Stop reason: HOSPADM

## 2019-01-31 RX ORDER — SODIUM CHLORIDE 9 MG/ML
INJECTION, SOLUTION INTRAVENOUS CONTINUOUS
Status: DISCONTINUED | OUTPATIENT
Start: 2019-01-31 | End: 2019-02-01 | Stop reason: HOSPADM

## 2019-01-31 RX ORDER — NALOXONE HYDROCHLORIDE 0.4 MG/ML
80 INJECTION, SOLUTION INTRAMUSCULAR; INTRAVENOUS; SUBCUTANEOUS AS NEEDED
Status: DISCONTINUED | OUTPATIENT
Start: 2019-01-31 | End: 2019-02-01 | Stop reason: HOSPADM

## 2019-01-31 RX ORDER — ACETAMINOPHEN 10 MG/ML
1000 INJECTION, SOLUTION INTRAVENOUS ONCE
Status: DISCONTINUED | OUTPATIENT
Start: 2019-01-31 | End: 2019-02-02

## 2019-01-31 RX ORDER — BUPIVACAINE HYDROCHLORIDE AND EPINEPHRINE 5; 5 MG/ML; UG/ML
INJECTION, SOLUTION EPIDURAL; INTRACAUDAL; PERINEURAL AS NEEDED
Status: DISCONTINUED | OUTPATIENT
Start: 2019-01-31 | End: 2019-02-01 | Stop reason: HOSPADM

## 2019-01-31 RX ORDER — METOCLOPRAMIDE HYDROCHLORIDE 5 MG/ML
10 INJECTION INTRAMUSCULAR; INTRAVENOUS AS NEEDED
Status: DISCONTINUED | OUTPATIENT
Start: 2019-01-31 | End: 2019-02-01 | Stop reason: HOSPADM

## 2019-01-31 RX ORDER — ACETAMINOPHEN 10 MG/ML
INJECTION, SOLUTION INTRAVENOUS
Status: DISCONTINUED | OUTPATIENT
Start: 2019-01-31 | End: 2019-02-01 | Stop reason: HOSPADM

## 2019-01-31 RX ORDER — SODIUM CHLORIDE, SODIUM LACTATE, POTASSIUM CHLORIDE, CALCIUM CHLORIDE 600; 310; 30; 20 MG/100ML; MG/100ML; MG/100ML; MG/100ML
INJECTION, SOLUTION INTRAVENOUS CONTINUOUS
Status: DISCONTINUED | OUTPATIENT
Start: 2019-01-31 | End: 2019-02-02

## 2019-01-31 RX ORDER — ONDANSETRON 2 MG/ML
4 INJECTION INTRAMUSCULAR; INTRAVENOUS AS NEEDED
Status: DISCONTINUED | OUTPATIENT
Start: 2019-01-31 | End: 2019-02-01 | Stop reason: HOSPADM

## 2019-01-31 RX ORDER — HEPARIN SODIUM 5000 [USP'U]/ML
5000 INJECTION, SOLUTION INTRAVENOUS; SUBCUTANEOUS ONCE
Status: DISCONTINUED | OUTPATIENT
Start: 2019-01-31 | End: 2019-01-31 | Stop reason: HOSPADM

## 2019-01-31 RX ORDER — DIPHENHYDRAMINE HYDROCHLORIDE 50 MG/ML
12.5 INJECTION INTRAMUSCULAR; INTRAVENOUS AS NEEDED
Status: DISCONTINUED | OUTPATIENT
Start: 2019-01-31 | End: 2019-02-01 | Stop reason: HOSPADM

## 2019-01-31 RX ORDER — CEFOXITIN 2 G/1
INJECTION, POWDER, FOR SOLUTION INTRAVENOUS
Status: DISCONTINUED | OUTPATIENT
Start: 2019-01-31 | End: 2019-02-01 | Stop reason: HOSPADM

## 2019-02-01 ENCOUNTER — HOSPITAL ENCOUNTER (INPATIENT)
Facility: HOSPITAL | Age: 29
LOS: 1 days | Discharge: HOME | DRG: 832 | End: 2019-02-02
Attending: OBSTETRICS & GYNECOLOGY | Admitting: OBSTETRICS & GYNECOLOGY
Payer: MEDICAID

## 2019-02-01 DIAGNOSIS — K35.80 ACUTE APPENDICITIS: ICD-10-CM

## 2019-02-01 PROCEDURE — 44970 LAPAROSCOPY APPENDECTOMY: CPT | Performed by: SURGERY

## 2019-02-01 RX ORDER — CHOLECALCIFEROL (VITAMIN D3) 25 MCG
1 TABLET,CHEWABLE ORAL DAILY
Status: DISCONTINUED | OUTPATIENT
Start: 2019-02-02 | End: 2019-02-02

## 2019-02-01 RX ORDER — SODIUM PHOSPHATE, DIBASIC AND SODIUM PHOSPHATE, MONOBASIC 7; 19 G/133ML; G/133ML
1 ENEMA RECTAL ONCE AS NEEDED
Status: DISCONTINUED | OUTPATIENT
Start: 2019-02-01 | End: 2019-02-02

## 2019-02-01 RX ORDER — MORPHINE SULFATE 4 MG/ML
8 INJECTION, SOLUTION INTRAMUSCULAR; INTRAVENOUS EVERY 2 HOUR PRN
Status: DISCONTINUED | OUTPATIENT
Start: 2019-02-01 | End: 2019-02-02

## 2019-02-01 RX ORDER — DOCUSATE SODIUM 100 MG/1
100 CAPSULE, LIQUID FILLED ORAL 2 TIMES DAILY
Status: DISCONTINUED | OUTPATIENT
Start: 2019-02-01 | End: 2019-02-02

## 2019-02-01 RX ORDER — HEPARIN SODIUM 5000 [USP'U]/ML
5000 INJECTION, SOLUTION INTRAVENOUS; SUBCUTANEOUS EVERY 8 HOURS SCHEDULED
Status: DISCONTINUED | OUTPATIENT
Start: 2019-02-01 | End: 2019-02-01

## 2019-02-01 RX ORDER — HEPARIN SODIUM 5000 [USP'U]/ML
5000 INJECTION, SOLUTION INTRAVENOUS; SUBCUTANEOUS EVERY 12 HOURS SCHEDULED
Status: DISCONTINUED | OUTPATIENT
Start: 2019-02-02 | End: 2019-02-02

## 2019-02-01 RX ORDER — HYDROCODONE BITARTRATE AND ACETAMINOPHEN 5; 325 MG/1; MG/1
1 TABLET ORAL EVERY 4 HOURS PRN
Status: DISCONTINUED | OUTPATIENT
Start: 2019-02-01 | End: 2019-02-02

## 2019-02-01 RX ORDER — PSEUDOEPHEDRINE HCL 30 MG
100 TABLET ORAL 2 TIMES DAILY
Qty: 30 CAPSULE | Refills: 0 | Status: SHIPPED | OUTPATIENT
Start: 2019-02-01 | End: 2019-05-09

## 2019-02-01 RX ORDER — MORPHINE SULFATE 4 MG/ML
2 INJECTION, SOLUTION INTRAMUSCULAR; INTRAVENOUS EVERY 2 HOUR PRN
Status: DISCONTINUED | OUTPATIENT
Start: 2019-02-01 | End: 2019-02-02

## 2019-02-01 RX ORDER — HYDROCODONE BITARTRATE AND ACETAMINOPHEN 5; 325 MG/1; MG/1
2 TABLET ORAL EVERY 4 HOURS PRN
Status: DISCONTINUED | OUTPATIENT
Start: 2019-02-01 | End: 2019-02-02

## 2019-02-01 RX ORDER — ACETAMINOPHEN 325 MG/1
325 TABLET ORAL EVERY 6 HOURS PRN
Status: DISCONTINUED | OUTPATIENT
Start: 2019-02-01 | End: 2019-02-02

## 2019-02-01 RX ORDER — BISACODYL 10 MG
10 SUPPOSITORY, RECTAL RECTAL
Status: DISCONTINUED | OUTPATIENT
Start: 2019-02-01 | End: 2019-02-02

## 2019-02-01 RX ORDER — MORPHINE SULFATE 4 MG/ML
4 INJECTION, SOLUTION INTRAMUSCULAR; INTRAVENOUS EVERY 2 HOUR PRN
Status: DISCONTINUED | OUTPATIENT
Start: 2019-02-01 | End: 2019-02-02

## 2019-02-01 RX ORDER — ONDANSETRON 2 MG/ML
4 INJECTION INTRAMUSCULAR; INTRAVENOUS EVERY 6 HOURS PRN
Status: DISCONTINUED | OUTPATIENT
Start: 2019-02-01 | End: 2019-02-02

## 2019-02-01 RX ORDER — HYDROCODONE BITARTRATE AND ACETAMINOPHEN 5; 325 MG/1; MG/1
1 TABLET ORAL EVERY 4 HOURS PRN
Qty: 20 TABLET | Refills: 0 | Status: SHIPPED | OUTPATIENT
Start: 2019-02-01 | End: 2019-03-26

## 2019-02-01 NOTE — PROGRESS NOTES
Report to Charline Pace RN @ this time. POC discussed and enforced. Pt stable with IV infusing and call light in reach, pt sleeping.

## 2019-02-01 NOTE — ANESTHESIA PREPROCEDURE EVALUATION
PRE-OP EVALUATION    Patient Name: Sukh Hill    Pre-op Diagnosis: Acute appendicitis [K35.80]    Procedure(s):  LAPAROSCOPIC APPENDECTOMY     Surgeon(s) and Role:     * Yenifer Timmons MD - Primary    Pre-op vitals reviewed.   Temp: 102.8 °F (39.3 °C 01/31/2019    RDW 13.4 01/31/2019    .0 01/31/2019     Lab Results   Component Value Date     (L) 01/31/2019    K 3.0 (L) 01/31/2019     01/31/2019    CO2 20.0 (L) 01/31/2019    BUN 6 (L) 01/31/2019    CREATSERUM 0.72 01/31/2019    GLU 1

## 2019-02-01 NOTE — OPERATIVE REPORT
PREOPERATIVE DIAGNOSIS: Acute appendicitis complicating pregnancy   POSTOPERATIVE DIAGNOSIS: Acute appendicitis complicating pregnancy. PROCEDURE PERFORMED: Laparoscopic appendectomy. ANESTHESIA: General endotracheal anesthesia.    ANESTHESIOLOGIST:  was obtained. DESCRIPTION OF PROCEDURE: The patient was brought to the operating room, and after the induction of general endotracheal anesthesia, a time-out was performed. Next, the abdomen was prepped and draped in the usual sterile fashion.  A 5-mm RUQ the patient was placed in neutral rotation. The 11-mm incision in the suprapubic position was reapproximated using 0 Maxon suture with an Endo Close device and a  guide. Next, the 5-mm trocar was removed under direct vision.  Pneumoperitoneum was aspir

## 2019-02-01 NOTE — PLAN OF CARE
Problem: Patient/Family Goals  Goal: Patient/Family Long Term Goal  Patient's Long Term Goal: maintain pregnancy as long as safe for mother and fetus    Interventions:    - See additional Care Plan goals for specific interventions   Outcome: Progressing

## 2019-02-01 NOTE — CONSULTS
[facility]  Report of Consultation    [facility] Patient Status:  Observation    1990 MRN [id number]   Location [facility] Attending Oz Terrazas MD   Hosp Day # 0 PCP Nash Laird MD     Reason for Consultation that she does not drink alcohol or use drugs.     Allergies:  No Known Allergies    Medications:    Current Facility-Administered Medications:   •  lactated ringers infusion, , Intravenous, Continuous  •  acetaminophen (OFIRMEV) infusion 1,000 mg, 1,000 mg, 01/31/2019    BUN 6 01/31/2019     01/31/2019    K 3.0 01/31/2019     01/31/2019    CO2 20.0 01/31/2019     01/31/2019    CA 8.3 01/31/2019    ALB 2.8 01/31/2019    ALKPHO 87 01/31/2019    BILT 1.3 01/31/2019    TP 6.7 01/31/2019    AST

## 2019-02-01 NOTE — PROGRESS NOTES
[address] Patient Status:  Inpatient    1990 MRN [id number]   Location [address] [place] [address] Attending Romulo Roberts MD   Hosp Day # 1 PCP Natalia Damon MD     SUBJECTIVE:    Interval History: Doin shift  Will do US with MFM for anatomy. Most likely keep her until ok for discharge per GS.       Marry Michaud  2/1/2019  10:49 AM

## 2019-02-01 NOTE — PROGRESS NOTES
Pt presents as  w/ edc of 19 c/o vomiting and fever for 2 days and abdominal cramping beginning this morning. Pt admitted to triage room 5 via wc accompanied by family. EFM tested and applied, FHTs tracing and audible in 160s.  Pt states she has be

## 2019-02-01 NOTE — IMAGING NOTE
History: Age: 34 years.  : 3 Para: 1.  ____________________________________________________________________________  Dating:  LMP: 2018 EDC: 2019 GA by LMP: w6d  Current Scan on: 2019 EDC: 2019 GA by current scan: w6d  The appropriate.    ____________________________________________________________________________  Comments:  Inpatient Examination.

## 2019-02-01 NOTE — PROGRESS NOTES
[facility]  Progress Note    Stephen Haywood Patient Status:  Inpatient    1990 MRN [id number]   Location [facility] [facility] Attending Sherryle Fennel, MD   Hosp Day # 1 PCP Sathish Maciel MD     Subjective:  Patient sitting up in Pregnancy     Acute appendicitis complicating pregnancy     Acute appendicitis      POD 1 lap appy   Pregnancy - 24 wk 6d     Plan:  1. ADAT   2. IV mefoxin   3. Tylenol for fever  4. Plans for US of fetus for anatomy   5.  Possible DC home later today if

## 2019-02-01 NOTE — ANESTHESIA POSTPROCEDURE EVALUATION
[address] Patient Status:  Observation   Age/Gender 34year old female MRN [id number]   [facility] SURGERY Attending Doug Menard, [facility] Se Day # 0 PCP Yan Pierre MD       Anesthesia Post-op Note    Procedure(s

## 2019-02-01 NOTE — PROGRESS NOTES
Pt brought to room 122 per bed in stable condition with IV infusing per transporter and significant other. Care assumed at this time. Pt in bathroom to attempt to void with no success. Oriented to room and POC, V/s obtained and call light in reach.  FHT to

## 2019-02-01 NOTE — H&P
[facility]    PATIENT'S NAME: Jazlyn Zamora   ATTENDING PHYSICIAN: Niyah Covarrubias M.D.    PATIENT ACCOUNT#:   [de-identified]    LOCATION:  OR  OR [place] ROOMS 5 EDWP 10  MEDICAL RECORD #:   [id number]       YOB: 1990  ADMISSION DATE: Positive fetal heart tones. No significant contractions noted on monitor. Diffuse abdominal tenderness. Positive guarding. ASSESSMENT:    3   A 60-year-old female,  3, para 1-0-1-1, at 24-5/7 weeks' gestation. 2.   Elevated temperature.   T-m

## 2019-02-01 NOTE — PROGRESS NOTES
Pt seen at the request of Dr. Jenni Dillard. Pt with history and exam concerning for acute appendicitis complicating pregnancy. To OR tonight for laproscopic appendectomy.      The risks, benefits, and alternatives of the procedure were explained to the p

## 2019-02-01 NOTE — BRIEF OP NOTE
Pre-Operative Diagnosis: Acute appendicitis complicating pregnancy   Post-Operative Diagnosis: Acute appendicitis complicating pregnancy     Procedure Performed:   Procedure(s):  LAPAROSCOPIC APPENDECTOMY     Surgeon(s) and Role:     * Lynda Foreman

## 2019-02-01 NOTE — PROGRESS NOTES
Page placed to Dr. Nic Rivera. Call received and orders received to monitor fetus per EFM upon arrival to unit. May remove EFM until morning if appropriate for gestational age. EFM prn with OB complaints through night.

## 2019-02-02 VITALS
HEIGHT: 64.02 IN | WEIGHT: 205 LBS | SYSTOLIC BLOOD PRESSURE: 101 MMHG | HEART RATE: 91 BPM | OXYGEN SATURATION: 99 % | RESPIRATION RATE: 20 BRPM | BODY MASS INDEX: 35 KG/M2 | DIASTOLIC BLOOD PRESSURE: 63 MMHG | TEMPERATURE: 98 F

## 2019-02-02 NOTE — PROGRESS NOTES
Pt preparing for d/c. Written d/c instructions verbally explained to pt and boyfriend, questions answered. Written RX given to pt, pt verbalizes understanding of d/c, follow up and complication recognition instructions.  2  incisions in RUQ and at umbilicus

## 2019-02-02 NOTE — PROGRESS NOTES
[address] Patient Status:  Inpatient    1990 MRN [id number]   Location [address] [place] [address] Attending Eh Mcdowell MD   Hosp Day # 2 PCP Aruna Barriga MD     SUBJECTIVE:    Interval History: has

## 2019-02-02 NOTE — PROGRESS NOTES
[facility]  Progress Note    Sukh Hill Patient Status:  Inpatient    1990 MRN [id number]   Location [facility] Attending Eugenio Sanchez MD   Hosp Day # 2 PCP Emily Mcginnis MD     Subjective:   The patient is laying was 27 minutes. Greater than half of our visit was spent in counseling the patient on the above listed diagnoses and treatment options.     Luke Whitaker PA-C  2/2/2019  8:37 AM

## 2019-03-23 ENCOUNTER — APPOINTMENT (OUTPATIENT)
Dept: ULTRASOUND IMAGING | Facility: HOSPITAL | Age: 29
End: 2019-03-23
Attending: EMERGENCY MEDICINE
Payer: MEDICAID

## 2019-03-23 ENCOUNTER — HOSPITAL ENCOUNTER (EMERGENCY)
Facility: HOSPITAL | Age: 29
Discharge: HOME | End: 2019-03-23
Attending: EMERGENCY MEDICINE
Payer: MEDICAID

## 2019-03-23 VITALS
DIASTOLIC BLOOD PRESSURE: 81 MMHG | HEIGHT: 62 IN | HEART RATE: 56 BPM | RESPIRATION RATE: 18 BRPM | BODY MASS INDEX: 37.54 KG/M2 | OXYGEN SATURATION: 97 % | TEMPERATURE: 99 F | SYSTOLIC BLOOD PRESSURE: 129 MMHG | WEIGHT: 204 LBS

## 2019-03-23 DIAGNOSIS — N30.01 ACUTE CYSTITIS WITH HEMATURIA: ICD-10-CM

## 2019-03-23 DIAGNOSIS — R10.11 ABDOMINAL PAIN, RIGHT UPPER QUADRANT: Primary | ICD-10-CM

## 2019-03-23 LAB
ALBUMIN SERPL-MCNC: 2.7 G/DL (ref 3.4–5)
ALBUMIN/GLOB SERPL: 0.7 {RATIO} (ref 1–2)
ALP LIVER SERPL-CCNC: 98 U/L (ref 37–98)
ALT SERPL-CCNC: 65 U/L (ref 13–56)
ANION GAP SERPL CALC-SCNC: 7 MMOL/L (ref 0–18)
AST SERPL-CCNC: 104 U/L (ref 15–37)
BASOPHILS # BLD AUTO: 0.02 X10(3) UL (ref 0–0.2)
BASOPHILS NFR BLD AUTO: 0.1 %
BILIRUB SERPL-MCNC: 0.4 MG/DL (ref 0.1–2)
BILIRUB UR QL STRIP.AUTO: NEGATIVE
BUN BLD-MCNC: 14 MG/DL (ref 7–18)
BUN/CREAT SERPL: 17.7 (ref 10–20)
CALCIUM BLD-MCNC: 8.7 MG/DL (ref 8.5–10.1)
CHLORIDE SERPL-SCNC: 109 MMOL/L (ref 98–107)
CO2 SERPL-SCNC: 24 MMOL/L (ref 21–32)
CREAT BLD-MCNC: 0.79 MG/DL (ref 0.55–1.02)
DEPRECATED RDW RBC AUTO: 42.4 FL (ref 35.1–46.3)
EOSINOPHIL # BLD AUTO: 0.16 X10(3) UL (ref 0–0.7)
EOSINOPHIL NFR BLD AUTO: 1.1 %
ERYTHROCYTE [DISTWIDTH] IN BLOOD BY AUTOMATED COUNT: 12.9 % (ref 11–15)
GLOBULIN PLAS-MCNC: 3.7 G/DL (ref 2.8–4.4)
GLUCOSE BLD-MCNC: 97 MG/DL (ref 70–99)
GLUCOSE UR STRIP.AUTO-MCNC: NEGATIVE MG/DL
HCT VFR BLD AUTO: 36.3 % (ref 35–48)
HGB BLD-MCNC: 12.8 G/DL (ref 12–16)
IMM GRANULOCYTES # BLD AUTO: 0.06 X10(3) UL (ref 0–1)
IMM GRANULOCYTES NFR BLD: 0.4 %
LIPASE SERPL-CCNC: 91 U/L (ref 73–393)
LYMPHOCYTES # BLD AUTO: 1.9 X10(3) UL (ref 1–4)
LYMPHOCYTES NFR BLD AUTO: 13.6 %
M PROTEIN MFR SERPL ELPH: 6.4 G/DL (ref 6.4–8.2)
MCH RBC QN AUTO: 31.5 PG (ref 26–34)
MCHC RBC AUTO-ENTMCNC: 35.3 G/DL (ref 31–37)
MCV RBC AUTO: 89.4 FL (ref 80–100)
MONOCYTES # BLD AUTO: 0.94 X10(3) UL (ref 0.1–1)
MONOCYTES NFR BLD AUTO: 6.8 %
NEUTROPHILS # BLD AUTO: 10.84 X10 (3) UL (ref 1.5–7.7)
NEUTROPHILS # BLD AUTO: 10.84 X10(3) UL (ref 1.5–7.7)
NEUTROPHILS NFR BLD AUTO: 78 %
NITRITE UR QL STRIP.AUTO: NEGATIVE
OSMOLALITY SERPL CALC.SUM OF ELEC: 290 MOSM/KG (ref 275–295)
PH UR STRIP.AUTO: 5 [PH] (ref 4.5–8)
PLATELET # BLD AUTO: 182 10(3)UL (ref 150–450)
POTASSIUM SERPL-SCNC: 4.5 MMOL/L (ref 3.5–5.1)
PROT UR STRIP.AUTO-MCNC: 100 MG/DL
RBC # BLD AUTO: 4.06 X10(6)UL (ref 3.8–5.3)
RBC UR QL AUTO: NEGATIVE
SODIUM SERPL-SCNC: 140 MMOL/L (ref 136–145)
SP GR UR STRIP.AUTO: 1.03 (ref 1–1.03)
UROBILINOGEN UR STRIP.AUTO-MCNC: 0.2 MG/DL
WBC # BLD AUTO: 13.9 X10(3) UL (ref 4–11)

## 2019-03-23 PROCEDURE — 85025 COMPLETE CBC W/AUTO DIFF WBC: CPT | Performed by: EMERGENCY MEDICINE

## 2019-03-23 PROCEDURE — 80053 COMPREHEN METABOLIC PANEL: CPT | Performed by: EMERGENCY MEDICINE

## 2019-03-23 PROCEDURE — 99284 EMERGENCY DEPT VISIT MOD MDM: CPT

## 2019-03-23 PROCEDURE — 81001 URINALYSIS AUTO W/SCOPE: CPT | Performed by: EMERGENCY MEDICINE

## 2019-03-23 PROCEDURE — 87086 URINE CULTURE/COLONY COUNT: CPT | Performed by: EMERGENCY MEDICINE

## 2019-03-23 PROCEDURE — 96361 HYDRATE IV INFUSION ADD-ON: CPT

## 2019-03-23 PROCEDURE — 99285 EMERGENCY DEPT VISIT HI MDM: CPT

## 2019-03-23 PROCEDURE — 76700 US EXAM ABDOM COMPLETE: CPT | Performed by: EMERGENCY MEDICINE

## 2019-03-23 PROCEDURE — 96374 THER/PROPH/DIAG INJ IV PUSH: CPT

## 2019-03-23 PROCEDURE — 83690 ASSAY OF LIPASE: CPT | Performed by: EMERGENCY MEDICINE

## 2019-03-23 PROCEDURE — 96375 TX/PRO/DX INJ NEW DRUG ADDON: CPT

## 2019-03-23 PROCEDURE — 59025 FETAL NON-STRESS TEST: CPT

## 2019-03-23 RX ORDER — ACETAMINOPHEN AND CODEINE PHOSPHATE 300; 30 MG/1; MG/1
1-2 TABLET ORAL EVERY 6 HOURS PRN
Qty: 10 TABLET | Refills: 0 | Status: ON HOLD | OUTPATIENT
Start: 2019-03-23 | End: 2019-03-30

## 2019-03-23 RX ORDER — ONDANSETRON 2 MG/ML
4 INJECTION INTRAMUSCULAR; INTRAVENOUS ONCE
Status: COMPLETED | OUTPATIENT
Start: 2019-03-23 | End: 2019-03-23

## 2019-03-23 RX ORDER — MORPHINE SULFATE 4 MG/ML
2 INJECTION, SOLUTION INTRAMUSCULAR; INTRAVENOUS ONCE
Status: COMPLETED | OUTPATIENT
Start: 2019-03-23 | End: 2019-03-23

## 2019-03-23 RX ORDER — METOCLOPRAMIDE 10 MG/1
10 TABLET ORAL 3 TIMES DAILY PRN
Qty: 20 TABLET | Refills: 0 | Status: ON HOLD | OUTPATIENT
Start: 2019-03-23 | End: 2019-03-30

## 2019-03-23 RX ORDER — CEPHALEXIN 250 MG/1
250 CAPSULE ORAL 4 TIMES DAILY
Qty: 28 CAPSULE | Refills: 0 | Status: ON HOLD | OUTPATIENT
Start: 2019-03-23 | End: 2019-03-30

## 2019-03-23 NOTE — ED PROVIDER NOTES
Patient Seen in: [facility] Emergency Department    History   Patient presents with:  Abdomen/Flank Pain (GI/)    Stated Complaint: R flank pain; 32 weeks pregnant    HPI    27-year-old female  at 26 weeks gestational age presents the ED with normal. Pupils are equal, round, and reactive to light. Neck: Normal range of motion. Neck supple. Cardiovascular: Normal rate and regular rhythm. Pulmonary/Chest: Effort normal and breath sounds normal.   Abdominal: Soft.  Bowel sounds are normal. CBC W/ DIFFERENTIAL[[id number]]          Abnormal            Final result                 Please view results for these tests on the individual orders.    URINE CULTURE, ROUTINE                MDM   27-year-old female presents ED with complaints of rig List    START taking these medications    cephALEXin 250 MG Oral Cap  Take 1 capsule (250 mg total) by mouth 4 (four) times daily for 7 days.   Qty: 28 capsule Refills: 0    Acetaminophen-Codeine #3 300-30 MG Oral Tab  Take 1-2 tablets by mouth every 6 (six

## 2019-03-23 NOTE — PROGRESS NOTES
RN @bedside for NST. Patient is 32 week  complaining of right flank pain. Patient denies any vaginal bleeding, leaking of fluid, or uterine contractions. Patient reports positive fetal movement. EFM/TOCO explained, tested, and applied.

## 2019-03-23 NOTE — PROGRESS NOTES
Dr. Odalys Huddleston notified of reassuring fetal heart tones, but non reactive NST. No further monitoring needed at this point in time per Dr. Odalys Huddleston.

## 2019-03-23 NOTE — ED INITIAL ASSESSMENT (HPI)
Pt to ED c/o right flank pain with nausea and 2 vomiting episodes onset few hrs PTA. Pt is 32 wks pregnant  A1.

## 2019-03-25 ENCOUNTER — TELEPHONE (OUTPATIENT)
Dept: OBGYN CLINIC | Facility: CLINIC | Age: 29
End: 2019-03-25

## 2019-03-25 NOTE — TELEPHONE ENCOUNTER
Patient seen in ER on 3/25/19 for c/o right flank pain. Dx with RUQ pain, acute cystitis with hematuria. Given abx and is to f/u with ob/gyn and surgery. Call to patient; no answer. Unable to leave message on VM due to full VM box.     [employer] [facility] message se

## 2019-03-26 ENCOUNTER — HOSPITAL ENCOUNTER (INPATIENT)
Facility: HOSPITAL | Age: 29
LOS: 3 days | Discharge: HOME | End: 2019-03-30
Attending: OBSTETRICS & GYNECOLOGY | Admitting: OBSTETRICS & GYNECOLOGY

## 2019-03-26 DIAGNOSIS — O16.3 HIGH BLOOD PRESSURE AFFECTING PREGNANCY IN THIRD TRIMESTER, ANTEPARTUM: ICD-10-CM

## 2019-03-26 DIAGNOSIS — Z3A.32 32 WEEKS GESTATION OF PREGNANCY: ICD-10-CM

## 2019-03-26 DIAGNOSIS — K80.50 BILIARY COLIC: Primary | ICD-10-CM

## 2019-03-26 DIAGNOSIS — R79.89 ELEVATED LFTS: ICD-10-CM

## 2019-03-26 DIAGNOSIS — D69.6 THROMBOCYTOPENIA (HCC): ICD-10-CM

## 2019-03-26 RX ORDER — HYDROMORPHONE HYDROCHLORIDE 1 MG/ML
0.5 INJECTION, SOLUTION INTRAMUSCULAR; INTRAVENOUS; SUBCUTANEOUS EVERY 30 MIN PRN
Status: DISCONTINUED | OUTPATIENT
Start: 2019-03-26 | End: 2019-03-27

## 2019-03-26 RX ORDER — DIPHENHYDRAMINE HYDROCHLORIDE 50 MG/ML
25 INJECTION INTRAMUSCULAR; INTRAVENOUS ONCE
Status: COMPLETED | OUTPATIENT
Start: 2019-03-26 | End: 2019-03-26

## 2019-03-26 RX ORDER — METOCLOPRAMIDE HYDROCHLORIDE 5 MG/ML
10 INJECTION INTRAMUSCULAR; INTRAVENOUS ONCE
Status: COMPLETED | OUTPATIENT
Start: 2019-03-26 | End: 2019-03-26

## 2019-03-27 ENCOUNTER — ANESTHESIA EVENT (OUTPATIENT)
Dept: OBGYN UNIT | Facility: HOSPITAL | Age: 29
End: 2019-03-27

## 2019-03-27 ENCOUNTER — ANESTHESIA (OUTPATIENT)
Dept: OBGYN UNIT | Facility: HOSPITAL | Age: 29
End: 2019-03-27

## 2019-03-27 ENCOUNTER — APPOINTMENT (OUTPATIENT)
Dept: ULTRASOUND IMAGING | Facility: HOSPITAL | Age: 29
End: 2019-03-27
Attending: OBSTETRICS & GYNECOLOGY

## 2019-03-27 PROBLEM — R79.89 ELEVATED LFTS: Status: ACTIVE | Noted: 2019-03-27

## 2019-03-27 PROBLEM — Z3A.32 32 WEEKS GESTATION OF PREGNANCY: Status: ACTIVE | Noted: 2019-03-27

## 2019-03-27 PROBLEM — O16.3 HIGH BLOOD PRESSURE AFFECTING PREGNANCY IN THIRD TRIMESTER, ANTEPARTUM (HCC): Status: ACTIVE | Noted: 2019-03-27

## 2019-03-27 PROBLEM — Z3A.32 32 WEEKS GESTATION OF PREGNANCY (HCC): Status: ACTIVE | Noted: 2019-03-27

## 2019-03-27 PROBLEM — K80.50 BILIARY COLIC: Status: ACTIVE | Noted: 2019-03-27

## 2019-03-27 PROBLEM — O16.3 HIGH BLOOD PRESSURE AFFECTING PREGNANCY IN THIRD TRIMESTER, ANTEPARTUM: Status: ACTIVE | Noted: 2019-03-27

## 2019-03-27 PROCEDURE — 59514 CESAREAN DELIVERY ONLY: CPT | Performed by: OBSTETRICS & GYNECOLOGY

## 2019-03-27 PROCEDURE — 76805 OB US >/= 14 WKS SNGL FETUS: CPT | Performed by: OBSTETRICS & GYNECOLOGY

## 2019-03-27 PROCEDURE — 76817 TRANSVAGINAL US OBSTETRIC: CPT | Performed by: OBSTETRICS & GYNECOLOGY

## 2019-03-27 RX ORDER — BETAMETHASONE SODIUM PHOSPHATE AND BETAMETHASONE ACETATE 3; 3 MG/ML; MG/ML
12 INJECTION, SUSPENSION INTRA-ARTICULAR; INTRALESIONAL; INTRAMUSCULAR; SOFT TISSUE EVERY 24 HOURS
Status: DISCONTINUED | OUTPATIENT
Start: 2019-03-27 | End: 2019-03-28

## 2019-03-27 RX ORDER — FAMOTIDINE 10 MG/ML
20 INJECTION, SOLUTION INTRAVENOUS ONCE
Status: COMPLETED | OUTPATIENT
Start: 2019-03-27 | End: 2019-03-27

## 2019-03-27 RX ORDER — SODIUM CHLORIDE, SODIUM LACTATE, POTASSIUM CHLORIDE, CALCIUM CHLORIDE 600; 310; 30; 20 MG/100ML; MG/100ML; MG/100ML; MG/100ML
INJECTION, SOLUTION INTRAVENOUS CONTINUOUS
Status: DISCONTINUED | OUTPATIENT
Start: 2019-03-27 | End: 2019-03-28

## 2019-03-27 RX ORDER — NALBUPHINE HCL 10 MG/ML
2.5 AMPUL (ML) INJECTION EVERY 4 HOURS PRN
Status: DISCONTINUED | OUTPATIENT
Start: 2019-03-27 | End: 2019-03-30

## 2019-03-27 RX ORDER — ACETAMINOPHEN 500 MG
1000 TABLET ORAL EVERY 6 HOURS PRN
Status: DISCONTINUED | OUTPATIENT
Start: 2019-03-27 | End: 2019-03-29

## 2019-03-27 RX ORDER — METOCLOPRAMIDE HYDROCHLORIDE 5 MG/ML
INJECTION INTRAMUSCULAR; INTRAVENOUS
Status: DISPENSED
Start: 2019-03-27 | End: 2019-03-27

## 2019-03-27 RX ORDER — MISOPROSTOL 200 UG/1
1000 TABLET ORAL ONCE
Status: COMPLETED | OUTPATIENT
Start: 2019-03-27 | End: 2019-03-27

## 2019-03-27 RX ORDER — CALCIUM CARBONATE 200(500)MG
1000 TABLET,CHEWABLE ORAL
Status: DISCONTINUED | OUTPATIENT
Start: 2019-03-27 | End: 2019-03-29

## 2019-03-27 RX ORDER — ONDANSETRON 2 MG/ML
4 INJECTION INTRAMUSCULAR; INTRAVENOUS ONCE AS NEEDED
Status: ACTIVE | OUTPATIENT
Start: 2019-03-27 | End: 2019-03-27

## 2019-03-27 RX ORDER — MISOPROSTOL 200 UG/1
TABLET ORAL
Status: COMPLETED
Start: 2019-03-27 | End: 2019-03-27

## 2019-03-27 RX ORDER — METHYLERGONOVINE MALEATE 0.2 MG/ML
INJECTION INTRAVENOUS
Status: DISCONTINUED
Start: 2019-03-27 | End: 2019-03-27 | Stop reason: WASHOUT

## 2019-03-27 RX ORDER — DIPHENHYDRAMINE HYDROCHLORIDE 50 MG/ML
12.5 INJECTION INTRAMUSCULAR; INTRAVENOUS EVERY 4 HOURS PRN
Status: DISCONTINUED | OUTPATIENT
Start: 2019-03-27 | End: 2019-03-30

## 2019-03-27 RX ORDER — CARBOPROST TROMETHAMINE 250 UG/ML
INJECTION, SOLUTION INTRAMUSCULAR
Status: DISCONTINUED
Start: 2019-03-27 | End: 2019-03-27 | Stop reason: WASHOUT

## 2019-03-27 RX ORDER — BETAMETHASONE SODIUM PHOSPHATE AND BETAMETHASONE ACETATE 3; 3 MG/ML; MG/ML
INJECTION, SUSPENSION INTRA-ARTICULAR; INTRALESIONAL; INTRAMUSCULAR; SOFT TISSUE
Status: DISPENSED
Start: 2019-03-27 | End: 2019-03-27

## 2019-03-27 RX ORDER — CALCIUM GLUCONATE 94 MG/ML
1 INJECTION, SOLUTION INTRAVENOUS ONCE AS NEEDED
Status: DISCONTINUED | OUTPATIENT
Start: 2019-03-27 | End: 2019-03-29

## 2019-03-27 RX ORDER — CEFAZOLIN SODIUM/WATER 2 G/20 ML
2 SYRINGE (ML) INTRAVENOUS ONCE
Status: COMPLETED | OUTPATIENT
Start: 2019-03-27 | End: 2019-03-27

## 2019-03-27 RX ORDER — FAMOTIDINE 10 MG/ML
INJECTION, SOLUTION INTRAVENOUS
Status: COMPLETED
Start: 2019-03-27 | End: 2019-03-27

## 2019-03-27 RX ORDER — ZOLPIDEM TARTRATE 5 MG/1
5 TABLET ORAL NIGHTLY PRN
Status: DISCONTINUED | OUTPATIENT
Start: 2019-03-27 | End: 2019-03-29

## 2019-03-27 RX ORDER — HYDROMORPHONE HYDROCHLORIDE 1 MG/ML
INJECTION, SOLUTION INTRAMUSCULAR; INTRAVENOUS; SUBCUTANEOUS
Status: DISPENSED
Start: 2019-03-27 | End: 2019-03-27

## 2019-03-27 RX ORDER — TRISODIUM CITRATE DIHYDRATE AND CITRIC ACID MONOHYDRATE 500; 334 MG/5ML; MG/5ML
30 SOLUTION ORAL ONCE
Status: COMPLETED | OUTPATIENT
Start: 2019-03-27 | End: 2019-03-27

## 2019-03-27 RX ORDER — ONDANSETRON 2 MG/ML
8 INJECTION INTRAMUSCULAR; INTRAVENOUS EVERY 6 HOURS PRN
Status: DISCONTINUED | OUTPATIENT
Start: 2019-03-27 | End: 2019-03-29

## 2019-03-27 RX ORDER — ONDANSETRON 2 MG/ML
4 INJECTION INTRAMUSCULAR; INTRAVENOUS EVERY 6 HOURS PRN
Status: DISCONTINUED | OUTPATIENT
Start: 2019-03-27 | End: 2019-03-27

## 2019-03-27 RX ORDER — NALBUPHINE HCL 10 MG/ML
2.5 AMPUL (ML) INJECTION
Status: DISCONTINUED | OUTPATIENT
Start: 2019-03-27 | End: 2019-03-27

## 2019-03-27 RX ORDER — DIPHENHYDRAMINE HYDROCHLORIDE 50 MG/ML
25 INJECTION INTRAMUSCULAR; INTRAVENOUS ONCE AS NEEDED
Status: ACTIVE | OUTPATIENT
Start: 2019-03-27 | End: 2019-03-27

## 2019-03-27 RX ORDER — METOCLOPRAMIDE HYDROCHLORIDE 5 MG/ML
10 INJECTION INTRAMUSCULAR; INTRAVENOUS ONCE
Status: COMPLETED | OUTPATIENT
Start: 2019-03-27 | End: 2019-03-27

## 2019-03-27 RX ORDER — NALOXONE HYDROCHLORIDE 0.4 MG/ML
0.08 INJECTION, SOLUTION INTRAMUSCULAR; INTRAVENOUS; SUBCUTANEOUS
Status: DISCONTINUED | OUTPATIENT
Start: 2019-03-27 | End: 2019-03-30

## 2019-03-27 RX ORDER — ACETAMINOPHEN 500 MG
500 TABLET ORAL EVERY 6 HOURS PRN
Status: DISCONTINUED | OUTPATIENT
Start: 2019-03-27 | End: 2019-03-29

## 2019-03-27 RX ORDER — DOCUSATE SODIUM 100 MG/1
100 CAPSULE, LIQUID FILLED ORAL 2 TIMES DAILY
Status: DISCONTINUED | OUTPATIENT
Start: 2019-03-27 | End: 2019-03-29

## 2019-03-27 RX ORDER — ONDANSETRON 2 MG/ML
4 INJECTION INTRAMUSCULAR; INTRAVENOUS EVERY 6 HOURS PRN
Status: DISCONTINUED | OUTPATIENT
Start: 2019-03-27 | End: 2019-03-30

## 2019-03-27 RX ORDER — DEXTROSE, SODIUM CHLORIDE, SODIUM LACTATE, POTASSIUM CHLORIDE, AND CALCIUM CHLORIDE 5; .6; .31; .03; .02 G/100ML; G/100ML; G/100ML; G/100ML; G/100ML
INJECTION, SOLUTION INTRAVENOUS CONTINUOUS
Status: DISCONTINUED | OUTPATIENT
Start: 2019-03-27 | End: 2019-03-28

## 2019-03-27 RX ORDER — HYDROMORPHONE HYDROCHLORIDE 1 MG/ML
1 INJECTION, SOLUTION INTRAMUSCULAR; INTRAVENOUS; SUBCUTANEOUS
Status: DISCONTINUED | OUTPATIENT
Start: 2019-03-27 | End: 2019-03-29

## 2019-03-27 RX ORDER — HYDROMORPHONE HYDROCHLORIDE 1 MG/ML
0.4 INJECTION, SOLUTION INTRAMUSCULAR; INTRAVENOUS; SUBCUTANEOUS EVERY 5 MIN PRN
Status: DISCONTINUED | OUTPATIENT
Start: 2019-03-27 | End: 2019-03-27

## 2019-03-27 NOTE — PROGRESS NOTES
This note also relates to the following rows which could not be included:  Pulse - Cannot attach notes to unvalidated device data  Resp - Cannot attach notes to unvalidated device data  BP - Cannot attach notes to unvalidated device data  SpO2 - Cannot att

## 2019-03-27 NOTE — PROGRESS NOTES
Full consult to follow. Contacted by ER for patient who is 32 weeks pregnant with gallstones. Seen by my physician assistant this morning. Pt has subsequently undergone a  for HELLP. There is no indication of acute cholecystitis.   Would pl

## 2019-03-27 NOTE — ANESTHESIA PREPROCEDURE EVALUATION
PRE-OP EVALUATION    Patient Name: Lb Nelson    Pre-op Diagnosis: 32 week 4 day intrauterine gestation; breech presentation; HELLP syndrome    Procedure(s):    Primary  section    Surgeon(s) and Role:     * Yudy Eaton MD - Primary     * IV bolus 1,000 mL 1,000 mL Intravenous Once   [COMPLETED] Metoclopramide HCl (REGLAN) injection 10 mg 10 mg Intravenous Once   [COMPLETED] diphenhydrAMINE HCl (BENADRYL) injection 25 mg 25 mg Intravenous Once       Outpatient Medications:    cephALEXin 250 Alcohol use: No      Alcohol/week: 0.0 oz      Drug use: No     Available pre-op labs reviewed.   Lab Results   Component Value Date    WBC 7.5 03/27/2019    RBC 3.74 (L) 03/27/2019    HGB 11.7 (L) 03/27/2019    HCT 33.0 (L) 03/27/2019    MCV 88.2 03/27/201

## 2019-03-27 NOTE — H&P
Pt is a 34year old  at [address] Has irreg prenatal care. Has been back and forth to ED with RUQ pain in the last week. Diagnosed with gallstones and sent home. Came back last night with continued pain. Platelets were low and LFT's increasing.  Now bel file        Attends meetings of clubs or organizations: Not on file        Relationship status: Not on file      Intimate partner violence:        Fear of current or ex partner: Not on file        Emotionally abused: Not on file        Physically abused: N pre-eclampsia with HELLP syndrome- BP's stable but platelets continue to drop. Discussed with Dr. Maryse Campos who recommends proceeding with delivery  3. Procedure to be performed: primary low transverse  section  4.  Indication- severe pre-eclampsia, HE

## 2019-03-27 NOTE — PROGRESS NOTES
Pericare given and pad changed pt moved well from side to side. Transferred to antepartum room 118 in stabel condition.

## 2019-03-27 NOTE — ED INITIAL ASSESSMENT (HPI)
Patient arrives from home with c/o abdominal pain. She is 32 weeks gestation- Seen in February at [facility] clinic once. She was treated in ER on Friday with c/o upper abdominal pain dx. With gallstones now pain is worse.

## 2019-03-27 NOTE — CM/SW NOTE
CM received order that patient has concerns related to father of baby. Patient, Toma Jeffery is sick and on Magnesium for HELLP, father of baby, Sary Diego, was disruptive and upsetting Netta.  Sary Diego was not banded and is not able to go to NICU to see infant due

## 2019-03-27 NOTE — PROGRESS NOTES
Dr Rolly Elise (Level III OB) notified of pt in ED. Pt is a 34year old ; EDC 19; 32.3 weeks gestation Pt receives prenatal care though [address] [place] [address] Dept; Pt has hx of appe during this pregnancy;  Pt was in ED on Friday 3/22/19 with c/o right side

## 2019-03-27 NOTE — PROGRESS NOTES
Pt spoke with step mother and father who live in [place], and stated that they will be driving in to see pt tomorrow. Pt also spoke to father of the baby and informed him that he cannot come to the NICU at this time.   Pt dozing off and on, but aware of the p

## 2019-03-27 NOTE — OPERATIVE REPORT
OB Post Operative Progress Note    Preoperative Diagnosis: 32 week 4 day intrauterine gestation; breech presentation; HELLP syndrome  Postoperative Diagnosis:same viable female  Primary surgeon:  Surgeon(s) and Role:     * Yudy Eaton MD - Primary performed. Cord blood was obtained. The infant was given to waiting NICU team  There was spontaneous delivery of an intact 3V cord placenta. The uterus was delivered through the incision. Uterus, tubes and ovaries were normal in appearance.  The uterine ca

## 2019-03-27 NOTE — CM/SW NOTE
DREW received a call from [employer], Mr Skaggs Livan, who stated that he had spent time with ROSIE Vu and boyfriend to [employer].   Kalyn Olivo listened to Arch Sacks tell his story of what had happened in Netta's patient room and he stated that he

## 2019-03-27 NOTE — PROGRESS NOTES
After reviewing chart,  this RN questioned pt regarding documented pregnancy 18, states pt was 16w6d. Pt first denied but upon questioning admitted to pregnancy and states had an  at [address] [place] [address]. Pt states her S/O, Juan Luis Stevens does not know about this.  Panchito Govea

## 2019-03-27 NOTE — ANESTHESIA POSTPROCEDURE EVALUATION
[address] Patient Status:  Inpatient   Age/Gender 34year old female MRN [id number]   Location [address] [facility] Attending Jamar Rebolledo MD   Hosp Day # 0 PCP Saurabh Turner MD       Anesthesia Post-op Note    Proced

## 2019-03-27 NOTE — CONSULTS
[facility]  Report of Consultation    Fadi Washburn Patient Status:  Inpatient    1990 MRN [id number]   Location [facility] Attending Maral Sequeira MD   Hosp Day # 0 PCP Carlo Jones MD     Reason for Consultation:  Kaylin Jaeger Performed by Ruy Villalta MD at [facility] MAIN OR   • TONSILLECTOMY       Family History   Problem Relation Age of Onset   • Heart Disorder Maternal Grandfather    • Hypertension Maternal Grandfather       reports that  has never smoked.  she has never used infusion, , Intravenous, Continuous  •  ondansetron HCl (ZOFRAN) injection 4 mg, 4 mg, Intravenous, Once PRN  •  Nalbuphine HCl (NUBAIN) injection 2.5 mg, 2.5 mg, Intravenous, Q15 Min PRN  •  diphenhydrAMINE HCl (BENADRYL) injection 25 mg, 25 mg, Intraveno wheezes, no rales, no rhonchi. No secondary use of accessory respiratory musculature. Cardiac: Regular rate and rhythm. Abdomen: Soft, gravid, mild tenderness in the right upper quadrant, no rebound pain or guarding, no peritoneal signs.   Healing la There is cholelithiasis without biliary tree dilation. Common bile duct measures 0.3 cm. There is no significant gallbladder wall thickening. No free fluid. Negative Rose sign. PANCREAS:  Normal.  SPLEEN:  Mild splenomegaly. The spleen measures 14. cholecystectomy. 2. Counseled patient regarding a low-fat diet, which will help to minimize biliary colic. Fatty foods include dairy, eggs, and certain meats. 3. Further management per obstetrics, Maternal-Fetal Medicine  4. All questions answered.     Chandni Guy

## 2019-03-27 NOTE — PLAN OF CARE
Problem: ANTEPARTUM/LABOR and DELIVERY  Goal: Maintain pregnancy as long as maternal and/or fetal condition is stable  INTERVENTIONS:  - Maternal surveillance  - Fetal surveillance  - Monitor uterine activity  - Medications as ordered  - Bedrest  Outcome: consult to assist with strengthening/mobility  - Encourage toileting schedule  Outcome: Progressing      Problem: NEUROLOGICAL - ADULT  Goal: Achieves stable or improved neurological status  INTERVENTIONS  - Assess for and report changes in neurological st

## 2019-03-27 NOTE — PROGRESS NOTES
This note also relates to the following rows which could not be included:  Pulse - Cannot attach notes to unvalidated device data  Resp - Cannot attach notes to unvalidated device data  SpO2 - Cannot attach notes to unvalidated device data    Pt transferre

## 2019-03-27 NOTE — ED PROVIDER NOTES
Patient Seen in: [facility] Emergency Department    History   Patient presents with:  Abdomen/Flank Pain (GI/)    Stated Complaint: upper abd pain, 32 weeks, OB aware    HPI    Patient is a  at 32 weeks gestation presents to the emergency dep reviewed. All other systems reviewed and negative except as noted above.     Physical Exam     ED Triage Vitals [03/26/19 2214]   /88   Pulse 86   Resp 20   Temp    Temp src    SpO2 98 %   O2 Device None (Room air)       Current:BP (!) 162/94   P Abnormality         Status                     ---------                               -----------         ------                     CBC W/ DIFFERENTIAL[[id number]]          Abnormal            Final result                 Please view results for these jalen Creatinine is normal.  LFTs are elevated as is alk phos. Total bili normal.  Lipase normal  Repeat urinalysis pending      I discussed case with Dr. Brandon Rodríguez.   She notes that patient does not follow-up with them regularly and believes the patient monica

## 2019-03-28 RX ORDER — SIMETHICONE 80 MG
80 TABLET,CHEWABLE ORAL 3 TIMES DAILY PRN
Status: DISCONTINUED | OUTPATIENT
Start: 2019-03-28 | End: 2019-03-30

## 2019-03-28 RX ORDER — KETOROLAC TROMETHAMINE 30 MG/ML
30 INJECTION, SOLUTION INTRAMUSCULAR; INTRAVENOUS ONCE
Status: COMPLETED | OUTPATIENT
Start: 2019-03-28 | End: 2019-03-28

## 2019-03-28 RX ORDER — HYDROCODONE BITARTRATE AND ACETAMINOPHEN 5; 325 MG/1; MG/1
1 TABLET ORAL EVERY 4 HOURS PRN
Status: DISCONTINUED | OUTPATIENT
Start: 2019-03-28 | End: 2019-03-30

## 2019-03-28 RX ORDER — KETOROLAC TROMETHAMINE 30 MG/ML
30 INJECTION, SOLUTION INTRAMUSCULAR; INTRAVENOUS EVERY 6 HOURS PRN
Status: DISCONTINUED | OUTPATIENT
Start: 2019-03-28 | End: 2019-03-28

## 2019-03-28 RX ORDER — HYDROCODONE BITARTRATE AND ACETAMINOPHEN 10; 325 MG/1; MG/1
1 TABLET ORAL EVERY 4 HOURS PRN
Status: DISCONTINUED | OUTPATIENT
Start: 2019-03-28 | End: 2019-03-30

## 2019-03-28 RX ORDER — IBUPROFEN 600 MG/1
600 TABLET ORAL EVERY 6 HOURS PRN
Status: DISCONTINUED | OUTPATIENT
Start: 2019-03-28 | End: 2019-03-30

## 2019-03-28 NOTE — PROGRESS NOTES
[facility]  Post-Partum Caesarean Section Progress Note    Mattie Rey Patient Status:  Inpatient    1990 MRN [id number]   Location [address] [facility] Attending Yessi Sanchez MD   Hosp Day # 1 PCP Rory Ohara MD     [address] [place] [address]

## 2019-03-28 NOTE — PROGRESS NOTES
Pt to room 2198 per w/c after visiting with infant at bedside x 20 minutes.  Report to [facility]

## 2019-03-28 NOTE — PROGRESS NOTES
This note also relates to the following rows which could not be included:  Dose (g/hr) Magnesium - Cannot attach notes to extension rows  Rate Magnesium  - Cannot attach notes to extension rows    Pt requesting to eat, will notified MD

## 2019-03-28 NOTE — CM/SW NOTE
SW met with pt to provide support and encouragement. SW provided pt with a journal and comfort items for distraction. SW updated pt on conversation with father of the baby, Kingsley Suggs.     Rio Barnes MSW, LCSW   for Maternal/Child Service

## 2019-03-28 NOTE — CM/SW NOTE
03/28/19 1100   CM/SW Referral Data   Referral Source Nurse;Family; Social Work (self-referral)   Reason for Referral Discharge planning;Psychoscial assessment   Informant Patient     SW completed an assessment with pt to provide support and encouragemen today to discuss a plan for NICU and visiting with pt. Pt in agreement with this. Pt was encouraged to focus on baby girl, Leobardo Forde, and her own health. Pt states understanding. Social work to remain available for support or any discharge planning needs.

## 2019-03-28 NOTE — PROGRESS NOTES
Pt requested to speak with this RN regarding S/O Anthony visiting. This RN sat at bedside and listened to pt and provided emotional support and encouragement. Pt called Zenia Manning as this RN was at bedside, pt then handed RN the phone to speak to Zenia Manning.  Encouragement g

## 2019-03-28 NOTE — PROGRESS NOTES
Pt up to bathroom with assist of RN and OBT per Dr. Zurita Mask agreement. Pt tolerated activity without difficulty, dizziness or weakness. POC discussed. Pericare completed. Pt assisted back to bed and positioned for comfort.
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
## 2019-03-28 NOTE — PROGRESS NOTES
Dr. Yaneth Pineda and Leonora BEEBE at bedside to discuss  care and offer emotional support/resources/answer pt questions.

## 2019-03-28 NOTE — CM/SW NOTE
DREW met with Netta in her patient room and reviewed insurance and PCP for infant, Amara Saunders in NICU. Netta stated that she will need infant added to medicaid. [facility] has already met with patient to do infant add on to medicaid.  Netta stated that she would

## 2019-03-28 NOTE — PROGRESS NOTES
This note also relates to the following rows which could not be included:  Dose (g/hr) Magnesium - Cannot attach notes to extension rows  Rate Magnesium  - Cannot attach notes to extension rows    Pt instructed to call RN any needs, pain, S/S as discussed

## 2019-03-29 RX ORDER — BISACODYL 10 MG
10 SUPPOSITORY, RECTAL RECTAL
Status: DISCONTINUED | OUTPATIENT
Start: 2019-03-29 | End: 2019-03-30

## 2019-03-29 RX ORDER — IBUPROFEN 600 MG/1
600 TABLET ORAL EVERY 6 HOURS SCHEDULED
Status: DISCONTINUED | OUTPATIENT
Start: 2019-03-30 | End: 2019-03-30

## 2019-03-29 RX ORDER — DOCUSATE SODIUM 100 MG/1
100 CAPSULE, LIQUID FILLED ORAL
Status: DISCONTINUED | OUTPATIENT
Start: 2019-03-29 | End: 2019-03-30

## 2019-03-29 NOTE — CM/SW NOTE
SW met with pt, Netta, to provide support and encouragement. She states feeling overwhelmed with her current situation. She admits to this SW that she is living in a motel in [place] with boyfriend, Toro Manriquez.  She is concerned about going back to the [facility] father discharged to home. Dr. Tom Swenson updated. RN, Onur Juarez updated. Social work to remain available for support or any discharge planning needs.     Carlos Degroot MSW, [facility]   for [address] at [facility]  Ph: [phone]

## 2019-03-29 NOTE — PROGRESS NOTES
Postpartum Progress Note    SUBJECTIVE:    Post-op day #2 s/p primary  section at 32 weeks for HELLP. No overnight events. No complaints. Ambulating, tolerating PO, voiding, + flatus. Breastfeeding.       OBJECTIVE:    Vital signs in last 24 h

## 2019-03-29 NOTE — NST
Nonstress Test   Patient: Sathya York    Gestation: 32w4d    NST:       Variability: Moderate           Accelerations: Yes           Decelerations: None            Baseline: 135 BPM           Uterine Irritability: No           Contractions: Not present

## 2019-03-30 VITALS
DIASTOLIC BLOOD PRESSURE: 94 MMHG | BODY MASS INDEX: 35.97 KG/M2 | WEIGHT: 203 LBS | OXYGEN SATURATION: 100 % | SYSTOLIC BLOOD PRESSURE: 126 MMHG | RESPIRATION RATE: 20 BRPM | HEIGHT: 63 IN | HEART RATE: 76 BPM | TEMPERATURE: 99 F

## 2019-03-30 RX ORDER — HYDROCODONE BITARTRATE AND ACETAMINOPHEN 5; 325 MG/1; MG/1
1-2 TABLET ORAL EVERY 6 HOURS PRN
Qty: 15 TABLET | Refills: 0 | Status: SHIPPED | OUTPATIENT
Start: 2019-03-30 | End: 2019-05-09

## 2019-03-30 RX ORDER — KETOROLAC TROMETHAMINE 30 MG/ML
30 INJECTION, SOLUTION INTRAMUSCULAR; INTRAVENOUS ONCE AS NEEDED
Status: DISCONTINUED | OUTPATIENT
Start: 2019-03-30 | End: 2019-03-30

## 2019-03-30 NOTE — PLAN OF CARE
COPING    • Pt/Family able to verbalize concerns and demonstrate effective coping strategies Completed        NEUROLOGICAL - ADULT    • Achieves stable or improved neurological status Completed    • Absence of seizures Completed    • Remains free of injury

## 2019-03-30 NOTE — PROGRESS NOTES
Discharge to [place] [address]. Patient given discharge instructions. Patient states she feels safe. Dr reviewed what to do if she feels unsafe.

## 2019-03-30 NOTE — PLAN OF CARE
Problem: POSTPARTUM  Goal: Optimize infant feeding at the breast  INTERVENTIONS:  - Initiate breast feeding within first hour after birth. - Monitor effectiveness of current breast feeding efforts. - Assess support systems available to mother/family.   - care.  - Provide comfort measures.   Outcome: Completed Date Met: 03/29/19  This is a breast pumping mom

## 2019-03-30 NOTE — PROGRESS NOTES
Postpartum Progress Note    SUBJECTIVE:    Post-op day #3 s/p primary  section at 32 weeks for HELLP. No overnight events. No complaints. Ambulating, tolerating PO, voiding, + flatus. Breastfeeding.       OBJECTIVE:    Vital signs in last 24 h

## 2019-04-01 ENCOUNTER — TELEPHONE (OUTPATIENT)
Dept: OBGYN UNIT | Facility: HOSPITAL | Age: 29
End: 2019-04-01

## 2019-04-08 NOTE — DISCHARGE SUMMARY
Admission date: 3/26/19  Discharge date: 3/30/19  Admission diagnosis: IUP at 32 weeks, breech presentation, HELLP syndrome  Discharge diagnosis: same  Operative Procedure: primary low transverse  section  Hospital course:   delivery

## 2020-04-09 ENCOUNTER — HOSPITAL ENCOUNTER (EMERGENCY)
Age: 30
Discharge: HOME | End: 2020-04-09
Attending: EMERGENCY MEDICINE
Payer: MEDICAID

## 2020-04-09 VITALS
HEIGHT: 62 IN | BODY MASS INDEX: 40.48 KG/M2 | WEIGHT: 220 LBS | TEMPERATURE: 98 F | HEART RATE: 90 BPM | SYSTOLIC BLOOD PRESSURE: 127 MMHG | RESPIRATION RATE: 18 BRPM | DIASTOLIC BLOOD PRESSURE: 68 MMHG | OXYGEN SATURATION: 100 %

## 2020-04-09 DIAGNOSIS — K08.89 PAIN, DENTAL: Primary | ICD-10-CM

## 2020-04-09 DIAGNOSIS — K01.1 IMPACTED THIRD MOLAR TOOTH: ICD-10-CM

## 2020-04-09 PROBLEM — K04.7 DENTAL ABSCESS: Status: ACTIVE | Noted: 2020-04-09

## 2020-04-09 PROCEDURE — 99283 EMERGENCY DEPT VISIT LOW MDM: CPT

## 2020-04-09 RX ORDER — TRAMADOL HYDROCHLORIDE 50 MG/1
TABLET ORAL EVERY 6 HOURS PRN
Qty: 12 TABLET | Refills: 0 | Status: SHIPPED | OUTPATIENT
Start: 2020-04-09 | End: 2020-10-12

## 2020-04-09 RX ORDER — CLINDAMYCIN HYDROCHLORIDE 300 MG/1
300 CAPSULE ORAL 3 TIMES DAILY
Qty: 30 CAPSULE | Refills: 0 | Status: SHIPPED | OUTPATIENT
Start: 2020-04-09 | End: 2020-04-19

## 2020-04-09 NOTE — ED PROVIDER NOTES
Patient Seen in: THE [facility] Emergency Department In [facility]      History   Patient presents with:  Dental Problem    Stated Complaint: LEFT UPPER DENTAL PAIN    HPI    17-year-old female here with complaint of left upper dental pain in the molar region fo 99.8 kg   LMP 03/16/2020   SpO2 100%   BMI 40.24 kg/m²         Physical Exam  Vitals signs and nursing note reviewed. Constitutional:       Appearance: She is well-developed. HENT:      Head: Normocephalic. Jaw: There is normal jaw occlusion. out treatment today and remains so upon discharge. I discussed the plan of care with the patient, who expresses understanding. All questions and concerns are addressed to the patients satisfaction prior to discharge today.                  Disposition and

## 2020-04-09 NOTE — ED INITIAL ASSESSMENT (HPI)
Patient presented to the ED with a left sided mouth pain.  Patient feels like something is leaking in her mouth

## 2020-06-18 ENCOUNTER — APPOINTMENT (OUTPATIENT)
Dept: ULTRASOUND IMAGING | Facility: HOSPITAL | Age: 30
End: 2020-06-18
Attending: EMERGENCY MEDICINE
Payer: MEDICAID

## 2020-06-18 ENCOUNTER — HOSPITAL ENCOUNTER (EMERGENCY)
Facility: HOSPITAL | Age: 30
Discharge: HOME | End: 2020-06-18
Attending: EMERGENCY MEDICINE
Payer: MEDICAID

## 2020-06-18 VITALS
DIASTOLIC BLOOD PRESSURE: 99 MMHG | TEMPERATURE: 98 F | OXYGEN SATURATION: 100 % | RESPIRATION RATE: 17 BRPM | WEIGHT: 220 LBS | BODY MASS INDEX: 40.48 KG/M2 | HEART RATE: 89 BPM | HEIGHT: 62 IN | SYSTOLIC BLOOD PRESSURE: 143 MMHG

## 2020-06-18 DIAGNOSIS — K80.20 CALCULUS OF GALLBLADDER WITHOUT CHOLECYSTITIS WITHOUT OBSTRUCTION: Primary | ICD-10-CM

## 2020-06-18 PROCEDURE — 81025 URINE PREGNANCY TEST: CPT

## 2020-06-18 PROCEDURE — 83690 ASSAY OF LIPASE: CPT | Performed by: EMERGENCY MEDICINE

## 2020-06-18 PROCEDURE — 76700 US EXAM ABDOM COMPLETE: CPT | Performed by: EMERGENCY MEDICINE

## 2020-06-18 PROCEDURE — 80053 COMPREHEN METABOLIC PANEL: CPT | Performed by: EMERGENCY MEDICINE

## 2020-06-18 PROCEDURE — 96374 THER/PROPH/DIAG INJ IV PUSH: CPT

## 2020-06-18 PROCEDURE — 81003 URINALYSIS AUTO W/O SCOPE: CPT | Performed by: EMERGENCY MEDICINE

## 2020-06-18 PROCEDURE — 96375 TX/PRO/DX INJ NEW DRUG ADDON: CPT

## 2020-06-18 PROCEDURE — 85025 COMPLETE CBC W/AUTO DIFF WBC: CPT | Performed by: EMERGENCY MEDICINE

## 2020-06-18 PROCEDURE — 99284 EMERGENCY DEPT VISIT MOD MDM: CPT

## 2020-06-18 PROCEDURE — 96361 HYDRATE IV INFUSION ADD-ON: CPT

## 2020-06-18 RX ORDER — HYDROCODONE BITARTRATE AND ACETAMINOPHEN 5; 325 MG/1; MG/1
1-2 TABLET ORAL EVERY 6 HOURS PRN
Qty: 10 TABLET | Refills: 0 | Status: SHIPPED | OUTPATIENT
Start: 2020-06-18 | End: 2020-06-25

## 2020-06-18 RX ORDER — KETOROLAC TROMETHAMINE 30 MG/ML
30 INJECTION, SOLUTION INTRAMUSCULAR; INTRAVENOUS ONCE
Status: COMPLETED | OUTPATIENT
Start: 2020-06-18 | End: 2020-06-18

## 2020-06-18 RX ORDER — SODIUM CHLORIDE 9 MG/ML
INJECTION, SOLUTION INTRAVENOUS CONTINUOUS
Status: DISCONTINUED | OUTPATIENT
Start: 2020-06-18 | End: 2020-06-18

## 2020-06-18 RX ORDER — ONDANSETRON 2 MG/ML
4 INJECTION INTRAMUSCULAR; INTRAVENOUS ONCE
Status: COMPLETED | OUTPATIENT
Start: 2020-06-18 | End: 2020-06-18

## 2020-06-18 RX ORDER — ONDANSETRON 4 MG/1
4 TABLET, ORALLY DISINTEGRATING ORAL EVERY 4 HOURS PRN
Qty: 10 TABLET | Refills: 0 | Status: SHIPPED | OUTPATIENT
Start: 2020-06-18 | End: 2020-06-25

## 2020-06-18 NOTE — ED NOTES
Pt ambulatory to the bathroom with steady gait to provide a urine specimen. This RN attempted IV x 1. Per patient her IV's are usually started with ultrasound.

## 2020-06-18 NOTE — ED PROVIDER NOTES
Patient Seen in: [facility] Emergency Department      History   Patient presents with:  Abdominal Pain    Stated Complaint: pt states she has gallstones and pointing to pain in epigastric area    HPI    Patient is a 44-year-old female who states she Temp src Temporal   SpO2 100 %   O2 Device None (Room air)       Current:BP (!) 143/99   Pulse 89   Temp 98.4 °F (36.9 °C) (Temporal)   Resp 17   Ht 157.5 cm (5' 2\")   Wt 99.8 kg   LMP 06/12/2020   SpO2 100%   Breastfeeding No   BMI 40.24 kg/m² GOLD   CBC W/ DIFFERENTIAL          Abdominal ultrasound1. Cholelithiasis. 2. Borderline to mild splenomegaly. Size is unchanged. MDM     Patient was given IV fluids. Patient given Toradol and Zofran. Patient pain significantly better.   On reeva

## 2020-10-12 ENCOUNTER — ANESTHESIA EVENT (OUTPATIENT)
Dept: SURGERY | Facility: HOSPITAL | Age: 30
End: 2020-10-12
Payer: COMMERCIAL

## 2020-10-12 ENCOUNTER — APPOINTMENT (OUTPATIENT)
Dept: ULTRASOUND IMAGING | Facility: HOSPITAL | Age: 30
End: 2020-10-12
Attending: EMERGENCY MEDICINE
Payer: COMMERCIAL

## 2020-10-12 ENCOUNTER — APPOINTMENT (OUTPATIENT)
Dept: GENERAL RADIOLOGY | Facility: HOSPITAL | Age: 30
End: 2020-10-12
Attending: SURGERY
Payer: COMMERCIAL

## 2020-10-12 ENCOUNTER — ANESTHESIA (OUTPATIENT)
Dept: SURGERY | Facility: HOSPITAL | Age: 30
End: 2020-10-12
Payer: COMMERCIAL

## 2020-10-12 ENCOUNTER — HOSPITAL ENCOUNTER (OUTPATIENT)
Facility: HOSPITAL | Age: 30
Setting detail: OBSERVATION
Discharge: HOME | End: 2020-10-13
Attending: EMERGENCY MEDICINE | Admitting: COLON & RECTAL SURGERY
Payer: COMMERCIAL

## 2020-10-12 DIAGNOSIS — K80.50 BILIARY COLIC: Primary | ICD-10-CM

## 2020-10-12 DIAGNOSIS — R52 INTRACTABLE PAIN: ICD-10-CM

## 2020-10-12 DIAGNOSIS — K81.9 CHOLECYSTITIS: ICD-10-CM

## 2020-10-12 PROBLEM — D64.9 ANEMIA: Status: ACTIVE | Noted: 2020-10-12

## 2020-10-12 PROBLEM — E87.2 METABOLIC ACIDOSIS: Status: ACTIVE | Noted: 2020-10-12

## 2020-10-12 PROBLEM — E87.20 METABOLIC ACIDOSIS: Status: ACTIVE | Noted: 2020-10-12

## 2020-10-12 PROCEDURE — BF101ZZ FLUOROSCOPY OF BILE DUCTS USING LOW OSMOLAR CONTRAST: ICD-10-PCS | Performed by: SURGERY

## 2020-10-12 PROCEDURE — 74300 X-RAY BILE DUCTS/PANCREAS: CPT | Performed by: SURGERY

## 2020-10-12 PROCEDURE — 0FT44ZZ RESECTION OF GALLBLADDER, PERCUTANEOUS ENDOSCOPIC APPROACH: ICD-10-PCS | Performed by: SURGERY

## 2020-10-12 PROCEDURE — 47563 LAPARO CHOLECYSTECTOMY/GRAPH: CPT | Performed by: PHYSICIAN ASSISTANT

## 2020-10-12 PROCEDURE — 76700 US EXAM ABDOM COMPLETE: CPT | Performed by: EMERGENCY MEDICINE

## 2020-10-12 PROCEDURE — 99213 OFFICE O/P EST LOW 20 MIN: CPT | Performed by: SURGERY

## 2020-10-12 PROCEDURE — 47563 LAPARO CHOLECYSTECTOMY/GRAPH: CPT | Performed by: SURGERY

## 2020-10-12 RX ORDER — HYDROMORPHONE HYDROCHLORIDE 1 MG/ML
0.4 INJECTION, SOLUTION INTRAMUSCULAR; INTRAVENOUS; SUBCUTANEOUS EVERY 5 MIN PRN
Status: DISCONTINUED | OUTPATIENT
Start: 2020-10-12 | End: 2020-10-12 | Stop reason: HOSPADM

## 2020-10-12 RX ORDER — FAMOTIDINE 10 MG/ML
20 INJECTION, SOLUTION INTRAVENOUS 2 TIMES DAILY
Status: DISCONTINUED | OUTPATIENT
Start: 2020-10-12 | End: 2020-10-13

## 2020-10-12 RX ORDER — NEOSTIGMINE METHYLSULFATE 1 MG/ML
INJECTION INTRAVENOUS AS NEEDED
Status: DISCONTINUED | OUTPATIENT
Start: 2020-10-12 | End: 2020-10-12 | Stop reason: SURG

## 2020-10-12 RX ORDER — HEPARIN SODIUM 5000 [USP'U]/ML
5000 INJECTION, SOLUTION INTRAVENOUS; SUBCUTANEOUS ONCE
Status: COMPLETED | OUTPATIENT
Start: 2020-10-12 | End: 2020-10-12

## 2020-10-12 RX ORDER — POLYETHYLENE GLYCOL 3350 17 G/17G
17 POWDER, FOR SOLUTION ORAL DAILY PRN
Status: DISCONTINUED | OUTPATIENT
Start: 2020-10-12 | End: 2020-10-13

## 2020-10-12 RX ORDER — SODIUM CHLORIDE 9 MG/ML
INJECTION, SOLUTION INTRAVENOUS CONTINUOUS
Status: DISCONTINUED | OUTPATIENT
Start: 2020-10-12 | End: 2020-10-12

## 2020-10-12 RX ORDER — HYDROMORPHONE HYDROCHLORIDE 1 MG/ML
1.2 INJECTION, SOLUTION INTRAMUSCULAR; INTRAVENOUS; SUBCUTANEOUS EVERY 2 HOUR PRN
Status: DISCONTINUED | OUTPATIENT
Start: 2020-10-12 | End: 2020-10-13

## 2020-10-12 RX ORDER — DOCUSATE SODIUM 100 MG/1
100 CAPSULE, LIQUID FILLED ORAL 2 TIMES DAILY
Status: DISCONTINUED | OUTPATIENT
Start: 2020-10-12 | End: 2020-10-13

## 2020-10-12 RX ORDER — BUPIVACAINE HYDROCHLORIDE 5 MG/ML
INJECTION, SOLUTION EPIDURAL; INTRACAUDAL AS NEEDED
Status: DISCONTINUED | OUTPATIENT
Start: 2020-10-12 | End: 2020-10-12

## 2020-10-12 RX ORDER — ONDANSETRON 2 MG/ML
4 INJECTION INTRAMUSCULAR; INTRAVENOUS EVERY 6 HOURS PRN
Status: DISCONTINUED | OUTPATIENT
Start: 2020-10-12 | End: 2020-10-13

## 2020-10-12 RX ORDER — NALOXONE HYDROCHLORIDE 0.4 MG/ML
80 INJECTION, SOLUTION INTRAMUSCULAR; INTRAVENOUS; SUBCUTANEOUS AS NEEDED
Status: DISCONTINUED | OUTPATIENT
Start: 2020-10-12 | End: 2020-10-12 | Stop reason: HOSPADM

## 2020-10-12 RX ORDER — HYDROMORPHONE HYDROCHLORIDE 1 MG/ML
0.8 INJECTION, SOLUTION INTRAMUSCULAR; INTRAVENOUS; SUBCUTANEOUS EVERY 2 HOUR PRN
Status: DISCONTINUED | OUTPATIENT
Start: 2020-10-12 | End: 2020-10-13

## 2020-10-12 RX ORDER — DEXAMETHASONE SODIUM PHOSPHATE 4 MG/ML
8 VIAL (ML) INJECTION AS NEEDED
Status: DISCONTINUED | OUTPATIENT
Start: 2020-10-12 | End: 2020-10-12 | Stop reason: HOSPADM

## 2020-10-12 RX ORDER — MORPHINE SULFATE 4 MG/ML
4 INJECTION, SOLUTION INTRAMUSCULAR; INTRAVENOUS EVERY 2 HOUR PRN
Status: DISCONTINUED | OUTPATIENT
Start: 2020-10-12 | End: 2020-10-12

## 2020-10-12 RX ORDER — MORPHINE SULFATE 4 MG/ML
4 INJECTION, SOLUTION INTRAMUSCULAR; INTRAVENOUS EVERY 30 MIN PRN
Status: DISCONTINUED | OUTPATIENT
Start: 2020-10-12 | End: 2020-10-12 | Stop reason: SDUPTHER

## 2020-10-12 RX ORDER — ONDANSETRON 2 MG/ML
4 INJECTION INTRAMUSCULAR; INTRAVENOUS AS NEEDED
Status: DISCONTINUED | OUTPATIENT
Start: 2020-10-12 | End: 2020-10-12 | Stop reason: HOSPADM

## 2020-10-12 RX ORDER — HEPARIN SODIUM 5000 [USP'U]/ML
5000 INJECTION, SOLUTION INTRAVENOUS; SUBCUTANEOUS EVERY 8 HOURS SCHEDULED
Status: DISCONTINUED | OUTPATIENT
Start: 2020-10-13 | End: 2020-10-13

## 2020-10-12 RX ORDER — METOCLOPRAMIDE HYDROCHLORIDE 5 MG/ML
10 INJECTION INTRAMUSCULAR; INTRAVENOUS AS NEEDED
Status: DISCONTINUED | OUTPATIENT
Start: 2020-10-12 | End: 2020-10-12 | Stop reason: HOSPADM

## 2020-10-12 RX ORDER — HYDROMORPHONE HYDROCHLORIDE 1 MG/ML
INJECTION, SOLUTION INTRAMUSCULAR; INTRAVENOUS; SUBCUTANEOUS
Status: COMPLETED
Start: 2020-10-12 | End: 2020-10-12

## 2020-10-12 RX ORDER — SODIUM CHLORIDE, SODIUM LACTATE, POTASSIUM CHLORIDE, CALCIUM CHLORIDE 600; 310; 30; 20 MG/100ML; MG/100ML; MG/100ML; MG/100ML
INJECTION, SOLUTION INTRAVENOUS CONTINUOUS
Status: DISCONTINUED | OUTPATIENT
Start: 2020-10-12 | End: 2020-10-12 | Stop reason: HOSPADM

## 2020-10-12 RX ORDER — SODIUM CHLORIDE, SODIUM LACTATE, POTASSIUM CHLORIDE, CALCIUM CHLORIDE 600; 310; 30; 20 MG/100ML; MG/100ML; MG/100ML; MG/100ML
INJECTION, SOLUTION INTRAVENOUS CONTINUOUS
Status: DISCONTINUED | OUTPATIENT
Start: 2020-10-12 | End: 2020-10-13

## 2020-10-12 RX ORDER — HYDROMORPHONE HYDROCHLORIDE 1 MG/ML
0.4 INJECTION, SOLUTION INTRAMUSCULAR; INTRAVENOUS; SUBCUTANEOUS EVERY 2 HOUR PRN
Status: DISCONTINUED | OUTPATIENT
Start: 2020-10-12 | End: 2020-10-13

## 2020-10-12 RX ORDER — MEPERIDINE HYDROCHLORIDE 25 MG/ML
12.5 INJECTION INTRAMUSCULAR; INTRAVENOUS; SUBCUTANEOUS AS NEEDED
Status: DISCONTINUED | OUTPATIENT
Start: 2020-10-12 | End: 2020-10-12 | Stop reason: HOSPADM

## 2020-10-12 RX ORDER — CEFOXITIN 2 G/1
INJECTION, POWDER, FOR SOLUTION INTRAVENOUS AS NEEDED
Status: DISCONTINUED | OUTPATIENT
Start: 2020-10-12 | End: 2020-10-12 | Stop reason: SURG

## 2020-10-12 RX ORDER — ONDANSETRON 2 MG/ML
INJECTION INTRAMUSCULAR; INTRAVENOUS AS NEEDED
Status: DISCONTINUED | OUTPATIENT
Start: 2020-10-12 | End: 2020-10-12 | Stop reason: SURG

## 2020-10-12 RX ORDER — ONDANSETRON 2 MG/ML
4 INJECTION INTRAMUSCULAR; INTRAVENOUS EVERY 4 HOURS PRN
Status: DISCONTINUED | OUTPATIENT
Start: 2020-10-12 | End: 2020-10-12

## 2020-10-12 RX ORDER — MIDAZOLAM HYDROCHLORIDE 1 MG/ML
1 INJECTION INTRAMUSCULAR; INTRAVENOUS EVERY 5 MIN PRN
Status: DISCONTINUED | OUTPATIENT
Start: 2020-10-12 | End: 2020-10-12 | Stop reason: HOSPADM

## 2020-10-12 RX ORDER — MORPHINE SULFATE 2 MG/ML
2 INJECTION, SOLUTION INTRAMUSCULAR; INTRAVENOUS EVERY 2 HOUR PRN
Status: DISCONTINUED | OUTPATIENT
Start: 2020-10-12 | End: 2020-10-12

## 2020-10-12 RX ORDER — HYDROCODONE BITARTRATE AND ACETAMINOPHEN 5; 325 MG/1; MG/1
1 TABLET ORAL EVERY 4 HOURS PRN
Status: DISCONTINUED | OUTPATIENT
Start: 2020-10-12 | End: 2020-10-13

## 2020-10-12 RX ORDER — LIDOCAINE HYDROCHLORIDE 10 MG/ML
INJECTION, SOLUTION EPIDURAL; INFILTRATION; INTRACAUDAL; PERINEURAL AS NEEDED
Status: DISCONTINUED | OUTPATIENT
Start: 2020-10-12 | End: 2020-10-12 | Stop reason: SURG

## 2020-10-12 RX ORDER — HYDROCODONE BITARTRATE AND ACETAMINOPHEN 5; 325 MG/1; MG/1
2 TABLET ORAL EVERY 4 HOURS PRN
Status: DISCONTINUED | OUTPATIENT
Start: 2020-10-12 | End: 2020-10-13

## 2020-10-12 RX ORDER — GLYCOPYRROLATE 0.2 MG/ML
INJECTION, SOLUTION INTRAMUSCULAR; INTRAVENOUS AS NEEDED
Status: DISCONTINUED | OUTPATIENT
Start: 2020-10-12 | End: 2020-10-12 | Stop reason: SURG

## 2020-10-12 RX ORDER — DEXAMETHASONE SODIUM PHOSPHATE 4 MG/ML
VIAL (ML) INJECTION AS NEEDED
Status: DISCONTINUED | OUTPATIENT
Start: 2020-10-12 | End: 2020-10-12 | Stop reason: SURG

## 2020-10-12 RX ORDER — ROCURONIUM BROMIDE 10 MG/ML
INJECTION, SOLUTION INTRAVENOUS AS NEEDED
Status: DISCONTINUED | OUTPATIENT
Start: 2020-10-12 | End: 2020-10-12 | Stop reason: SURG

## 2020-10-12 RX ORDER — ONDANSETRON 2 MG/ML
4 INJECTION INTRAMUSCULAR; INTRAVENOUS ONCE
Status: COMPLETED | OUTPATIENT
Start: 2020-10-12 | End: 2020-10-12

## 2020-10-12 RX ORDER — MORPHINE SULFATE 4 MG/ML
4 INJECTION, SOLUTION INTRAMUSCULAR; INTRAVENOUS EVERY 30 MIN PRN
Status: DISCONTINUED | OUTPATIENT
Start: 2020-10-12 | End: 2020-10-13

## 2020-10-12 RX ORDER — HYDROCODONE BITARTRATE AND ACETAMINOPHEN 5; 325 MG/1; MG/1
2 TABLET ORAL AS NEEDED
Status: DISCONTINUED | OUTPATIENT
Start: 2020-10-12 | End: 2020-10-12 | Stop reason: HOSPADM

## 2020-10-12 RX ORDER — HYDROCODONE BITARTRATE AND ACETAMINOPHEN 5; 325 MG/1; MG/1
1 TABLET ORAL AS NEEDED
Status: DISCONTINUED | OUTPATIENT
Start: 2020-10-12 | End: 2020-10-12 | Stop reason: HOSPADM

## 2020-10-12 RX ADMIN — CEFOXITIN 2 G: 2 INJECTION, POWDER, FOR SOLUTION INTRAVENOUS at 16:12:00

## 2020-10-12 RX ADMIN — LIDOCAINE HYDROCHLORIDE 50 MG: 10 INJECTION, SOLUTION EPIDURAL; INFILTRATION; INTRACAUDAL; PERINEURAL at 16:10:00

## 2020-10-12 RX ADMIN — ROCURONIUM BROMIDE 35 MG: 10 INJECTION, SOLUTION INTRAVENOUS at 16:22:00

## 2020-10-12 RX ADMIN — ROCURONIUM BROMIDE 5 MG: 10 INJECTION, SOLUTION INTRAVENOUS at 16:10:00

## 2020-10-12 RX ADMIN — GLYCOPYRROLATE 0.8 MG: 0.2 INJECTION, SOLUTION INTRAMUSCULAR; INTRAVENOUS at 17:22:00

## 2020-10-12 RX ADMIN — DEXAMETHASONE SODIUM PHOSPHATE 8 MG: 4 MG/ML VIAL (ML) INJECTION at 16:33:00

## 2020-10-12 RX ADMIN — NEOSTIGMINE METHYLSULFATE 4 MG: 1 INJECTION INTRAVENOUS at 17:22:00

## 2020-10-12 RX ADMIN — ONDANSETRON 4 MG: 2 INJECTION INTRAMUSCULAR; INTRAVENOUS at 16:30:00

## 2020-10-12 RX ADMIN — SODIUM CHLORIDE, SODIUM LACTATE, POTASSIUM CHLORIDE, CALCIUM CHLORIDE: 600; 310; 30; 20 INJECTION, SOLUTION INTRAVENOUS at 17:41:00

## 2020-10-12 NOTE — OPERATIVE REPORT
[facility]  Operative Note     Yina Barnes Location: OR   [facility] [id number] MRN [id number]   Admission Date 10/12/2020 Operation Date 10/12/2020   Attending Physician Candida Ozuna MD Operating Physician Enrique Nathna DO      Preoperative Diagn until the cystic duct and cystic artery were isolated. A single clip was placed on the proximal cystic duct a small opening made in the cystic duct. C-arm cholangiography was carried out using full-strength Hypaque solution.   Initially a guidewire was pa

## 2020-10-12 NOTE — ANESTHESIA PREPROCEDURE EVALUATION
PRE-OP EVALUATION    Patient Name: April Parisi    Pre-op Diagnosis: Cholecystitis [K81.9]    Procedure(s):  LAPAROSCOPIC CHOLECYSTECTOMY WITH INTRAOPERATIVE CHOLANGIOGRAM    Surgeon(s) and Role:     Silvio Rivera, DO - Primary    Pre-op vitals reviewe neuro/psych ROS.                                   Past Surgical History:   Procedure Laterality Date   • APPENDECTOMY  2019   •  SECTION N/A 3/27/2019    Performed by Chloe Barr MD at [address] [place] [address] L+D OR   • ESOPHAGOGASTRODUODENOSCOPY (EGD) N/A signed consent form.      Plan/risks discussed with: patient                Present on Admission:  • Anemia  • Metabolic acidosis

## 2020-10-12 NOTE — ANESTHESIA PROCEDURE NOTES
Airway  Urgency: elective      General Information and Staff    Patient location during procedure: OR  Anesthesiologist: Adamaris Rose MD  Performed: anesthesiologist     Indications and Patient Condition  Indications for airway management: anesthesia  Constanza

## 2020-10-12 NOTE — ANESTHESIA POSTPROCEDURE EVALUATION
[address] Patient Status:  Observation   Age/Gender 31year old female MRN [id number]   [facility] SURGERY Attending Brad Gonsalves MD   Hosp Day # 0 PCP Sheryle Rising       Anesthesia Post-op Note    Procedure(s):

## 2020-10-12 NOTE — ED PROVIDER NOTES
Patient Seen in: [facility] Emergency Department      History   Patient presents with:  Abdomen/Flank Pain    Stated Complaint: PT c/o of abd pain, hx of gallstones     HPI    51-year-old with a history of eczema, asthma, gallstones, appendectomy pr ED Triage Vitals [10/12/20 0440]   BP (!) 133/92   Pulse 88   Resp 16   Temp 98 °F (36.7 °C)   Temp src Temporal   SpO2 100 %   O2 Device None (Room air)       Current:/80   Pulse 83   Temp 98 °F (36.7 °C) (Temporal)   Resp 16   LMP 06/12/2020 ---------                               -----------         ------                     CBC W/ DIFFERENTIAL[[id number]]          Abnormal            Final result                 Please view results for these tests on the individual orders.    POCT PREGNANCY pain    Disposition:  Admit  10/12/2020  7:11 am            Medications Prescribed:  Current Discharge Medication List                       Present on Admission  Date Reviewed: 1/31/2019          ICD-10-CM Noted POA    Anemia D64.9 10/12/2020 Yes    Bilia

## 2020-10-12 NOTE — H&P
[facility]  Report of  Surgical Consultation with History and Physical Exam    Fadi Washburn Patient Status:  Emergency    1990 MRN [id number]   Location [address] [facility] Attending Grzegorz Foster MD   [facility] Day # 0 PCP Nargis Cordelia Castañeda MD at [facility] MAIN OR   • TONSILLECTOMY       Family History   Problem Relation Age of Onset   • Heart Disorder Maternal Grandfather    • Hypertension Maternal Grandfather       reports that she has never smoked.  She has never used smokeless tobacco. S rotation and lateral flexion of cervical spine. No JVD. Supple. Lungs: Clear to auscultation bilaterally. No wheezes, no rales, no rhonchi. Good excursion of the diaphragms. No secondary use of accessory respiratory musculature.     Cardiac: Regular ra or biliary ductal dilation. Mild splenomegaly. No ascites.            Dictated by (CST): Joy Kapadia MD on 10/12/2020 at 6:09 AM       Finalized by (CST): Joy Kapadia MD on 10/12/2020 at 6:10 AM       Impression:  Patient Active Problem List:

## 2020-10-12 NOTE — PROGRESS NOTES
Patient has been resting comfortable, she has been NPO since 5am. VS are stable.  Now on her way to OR

## 2020-10-13 VITALS
OXYGEN SATURATION: 96 % | HEART RATE: 50 BPM | DIASTOLIC BLOOD PRESSURE: 55 MMHG | RESPIRATION RATE: 18 BRPM | BODY MASS INDEX: 39.02 KG/M2 | HEIGHT: 62 IN | TEMPERATURE: 98 F | WEIGHT: 212.06 LBS | SYSTOLIC BLOOD PRESSURE: 104 MMHG

## 2020-10-13 RX ORDER — HYDROCODONE BITARTRATE AND ACETAMINOPHEN 5; 325 MG/1; MG/1
1 TABLET ORAL EVERY 6 HOURS PRN
Qty: 15 TABLET | Refills: 0 | Status: SHIPPED | OUTPATIENT
Start: 2020-10-13 | End: 2020-10-19

## 2020-10-13 NOTE — PROGRESS NOTES
NURSING DISCHARGE NOTE    Patient tolerated soft diet without nausea or vomiting. Educated on drain care. Patient verbalized understanding. Discharged Home via Wheelchair. Accompanied by Spouse  Belongings Taken by patient/family.

## 2020-10-13 NOTE — PROGRESS NOTES
Patient is back from OR drowsy but easily awaken. VS stable, Lap sites x3 noted CDI, ROMERO drain to bulb suctions. C/o mild to moderate pain , IV Dilaudid given. Clear liquid tray ordered at this time.

## 2020-10-13 NOTE — PROGRESS NOTES
[facility]  Progress Note    Yina Barnes Patient Status:  Observation    1990 MRN [id number]   [facility] 3SW-A Attending Candida Ozuna MD   Hosp Day # 0 PCP Kareem Mcelroy     Subjective:  No new complaints.  She reports m Intractable pain    POD lap soledad    Plan:  1. Stable for discharge to home today  2. Continue diet as tolerated  3. Continue drain care  4. Continue pain control  5.  Follow-up with [facility] general surgery in one week    My total face time with this patient wa

## 2020-10-13 NOTE — PLAN OF CARE
Pt. Admitted for abdominal pain on 10-12-20, lap soledad by Dr. Cifuentes Ache same day, POD 1. Pain level is moderately controlled on combination of oral and IV pain meds.  Ambulates with standby assist. 3 lap sites with steri strips are cdi, ROMERO to suction with sma

## 2020-10-19 ENCOUNTER — OFFICE VISIT (OUTPATIENT)
Dept: SURGERY | Facility: CLINIC | Age: 30
End: 2020-10-19
Payer: MEDICAID

## 2020-10-19 VITALS — TEMPERATURE: 97 F

## 2020-10-19 DIAGNOSIS — Z90.49 S/P LAPAROSCOPIC CHOLECYSTECTOMY: ICD-10-CM

## 2020-10-19 DIAGNOSIS — K81.2 ACUTE CHOLECYSTITIS WITH CHRONIC CHOLECYSTITIS: Primary | ICD-10-CM

## 2020-10-19 DIAGNOSIS — K80.50 BILIARY COLIC: ICD-10-CM

## 2020-10-19 PROBLEM — K81.0 ACUTE CHOLECYSTITIS: Status: RESOLVED | Noted: 2020-10-19 | Resolved: 2020-10-19

## 2020-10-19 PROBLEM — K81.0 ACUTE CHOLECYSTITIS: Status: ACTIVE | Noted: 2020-10-19

## 2020-10-19 PROCEDURE — 1111F DSCHRG MED/CURRENT MED MERGE: CPT | Performed by: PHYSICIAN ASSISTANT

## 2020-10-19 PROCEDURE — 99024 POSTOP FOLLOW-UP VISIT: CPT | Performed by: PHYSICIAN ASSISTANT

## 2020-10-19 RX ORDER — AMOXICILLIN AND CLAVULANATE POTASSIUM 875; 125 MG/1; MG/1
1 TABLET, FILM COATED ORAL 2 TIMES DAILY
Qty: 14 TABLET | Refills: 0 | Status: ON HOLD | OUTPATIENT
Start: 2020-10-19 | End: 2020-10-23

## 2020-10-19 NOTE — PROGRESS NOTES
Post Operative Visit Note       Active Problems  1. Biliary colic    2. Acute cholecystitis with chronic cholecystitis    3.  S/P laparoscopic cholecystectomy         Chief Complaint   Patient presents with:  Gallbladder: Lap Lesa - drain stitch fell out - N/A 1/31/2019    Performed by Chantel Porras MD at [facility] MAIN OR   • LAPAROSCOPIC CHOLECYSTECTOMY     • LAPAROSCOPIC CHOLECYSTECTOMY N/A 10/12/2020    Performed by Caty Maher DO at [facility] MAIN OR   • TONSILLECTOMY         The family history and social hi Hematological: Negative for adenopathy. Does not bruise/bleed easily. Psychiatric/Behavioral: Negative for behavioral problems and sleep disturbance.        Physical Findings   Temp 97.2 °F (36.2 °C)   LMP 06/12/2020   Physical Exam   Constitutional: Sh Electronically signed by Keli He MD on 10/14/2020 at  4:36 PM       Assessment   Biliary colic  (primary encounter diagnosis)  Acute cholecystitis with chronic cholecystitis  S/P laparoscopic cholecystectomy      Plan   Overall the patient is heali

## 2020-10-20 ENCOUNTER — HOSPITAL ENCOUNTER (INPATIENT)
Facility: HOSPITAL | Age: 30
LOS: 3 days | Discharge: HOME | DRG: 856 | End: 2020-10-23
Attending: EMERGENCY MEDICINE | Admitting: INTERNAL MEDICINE
Payer: COMMERCIAL

## 2020-10-20 ENCOUNTER — APPOINTMENT (OUTPATIENT)
Dept: NUCLEAR MEDICINE | Facility: HOSPITAL | Age: 30
DRG: 856 | End: 2020-10-20
Attending: PHYSICIAN ASSISTANT
Payer: COMMERCIAL

## 2020-10-20 ENCOUNTER — APPOINTMENT (OUTPATIENT)
Dept: CT IMAGING | Facility: HOSPITAL | Age: 30
DRG: 856 | End: 2020-10-20
Attending: EMERGENCY MEDICINE
Payer: COMMERCIAL

## 2020-10-20 DIAGNOSIS — R10.11 ABDOMINAL PAIN, RIGHT UPPER QUADRANT: ICD-10-CM

## 2020-10-20 DIAGNOSIS — S31.109A OPEN ABDOMINAL WALL WOUND: ICD-10-CM

## 2020-10-20 DIAGNOSIS — R50.9 FEVER, UNSPECIFIED FEVER CAUSE: Primary | ICD-10-CM

## 2020-10-20 PROBLEM — D72.829 LEUKOCYTOSIS: Status: ACTIVE | Noted: 2020-10-20

## 2020-10-20 PROBLEM — E87.20 LACTIC ACIDOSIS: Status: ACTIVE | Noted: 2020-10-12

## 2020-10-20 PROBLEM — E87.2 LACTIC ACIDOSIS: Status: ACTIVE | Noted: 2020-10-12

## 2020-10-20 PROBLEM — R79.89 ELEVATED SERUM CREATININE: Status: ACTIVE | Noted: 2019-03-27

## 2020-10-20 PROCEDURE — 78226 HEPATOBILIARY SYSTEM IMAGING: CPT | Performed by: PHYSICIAN ASSISTANT

## 2020-10-20 PROCEDURE — 99223 1ST HOSP IP/OBS HIGH 75: CPT | Performed by: SURGERY

## 2020-10-20 PROCEDURE — 78830 RP LOCLZJ TUM SPECT W/CT 1: CPT | Performed by: PHYSICIAN ASSISTANT

## 2020-10-20 PROCEDURE — 71275 CT ANGIOGRAPHY CHEST: CPT | Performed by: EMERGENCY MEDICINE

## 2020-10-20 PROCEDURE — 99223 1ST HOSP IP/OBS HIGH 75: CPT | Performed by: INTERNAL MEDICINE

## 2020-10-20 PROCEDURE — 74177 CT ABD & PELVIS W/CONTRAST: CPT | Performed by: EMERGENCY MEDICINE

## 2020-10-20 RX ORDER — MORPHINE SULFATE 2 MG/ML
2 INJECTION, SOLUTION INTRAMUSCULAR; INTRAVENOUS EVERY 2 HOUR PRN
Status: DISCONTINUED | OUTPATIENT
Start: 2020-10-20 | End: 2020-10-23

## 2020-10-20 RX ORDER — ONDANSETRON 2 MG/ML
4 INJECTION INTRAMUSCULAR; INTRAVENOUS ONCE
Status: COMPLETED | OUTPATIENT
Start: 2020-10-20 | End: 2020-10-20

## 2020-10-20 RX ORDER — HYDROMORPHONE HYDROCHLORIDE 1 MG/ML
0.1 INJECTION, SOLUTION INTRAMUSCULAR; INTRAVENOUS; SUBCUTANEOUS EVERY 2 HOUR PRN
Status: DISCONTINUED | OUTPATIENT
Start: 2020-10-20 | End: 2020-10-23

## 2020-10-20 RX ORDER — MORPHINE SULFATE 2 MG/ML
1 INJECTION, SOLUTION INTRAMUSCULAR; INTRAVENOUS EVERY 2 HOUR PRN
Status: DISCONTINUED | OUTPATIENT
Start: 2020-10-20 | End: 2020-10-23

## 2020-10-20 RX ORDER — HYDROMORPHONE HYDROCHLORIDE 1 MG/ML
0.4 INJECTION, SOLUTION INTRAMUSCULAR; INTRAVENOUS; SUBCUTANEOUS EVERY 2 HOUR PRN
Status: DISCONTINUED | OUTPATIENT
Start: 2020-10-20 | End: 2020-10-23

## 2020-10-20 RX ORDER — BISACODYL 10 MG
10 SUPPOSITORY, RECTAL RECTAL
Status: DISCONTINUED | OUTPATIENT
Start: 2020-10-20 | End: 2020-10-23

## 2020-10-20 RX ORDER — MORPHINE SULFATE 4 MG/ML
4 INJECTION, SOLUTION INTRAMUSCULAR; INTRAVENOUS EVERY 2 HOUR PRN
Status: DISCONTINUED | OUTPATIENT
Start: 2020-10-20 | End: 2020-10-23

## 2020-10-20 RX ORDER — ENOXAPARIN SODIUM 100 MG/ML
40 INJECTION SUBCUTANEOUS DAILY
Status: DISCONTINUED | OUTPATIENT
Start: 2020-10-20 | End: 2020-10-23

## 2020-10-20 RX ORDER — MORPHINE SULFATE 4 MG/ML
4 INJECTION, SOLUTION INTRAMUSCULAR; INTRAVENOUS ONCE
Status: COMPLETED | OUTPATIENT
Start: 2020-10-20 | End: 2020-10-20

## 2020-10-20 RX ORDER — ACETAMINOPHEN 325 MG/1
650 TABLET ORAL EVERY 6 HOURS PRN
Status: DISCONTINUED | OUTPATIENT
Start: 2020-10-20 | End: 2020-10-23

## 2020-10-20 RX ORDER — HYDROCODONE BITARTRATE AND ACETAMINOPHEN 5; 325 MG/1; MG/1
1 TABLET ORAL EVERY 6 HOURS PRN
Status: ON HOLD | COMMUNITY
End: 2020-10-23

## 2020-10-20 RX ORDER — SODIUM CHLORIDE, SODIUM LACTATE, POTASSIUM CHLORIDE, CALCIUM CHLORIDE 600; 310; 30; 20 MG/100ML; MG/100ML; MG/100ML; MG/100ML
INJECTION, SOLUTION INTRAVENOUS CONTINUOUS
Status: DISCONTINUED | OUTPATIENT
Start: 2020-10-20 | End: 2020-10-20

## 2020-10-20 RX ORDER — DOCUSATE SODIUM 100 MG/1
100 CAPSULE, LIQUID FILLED ORAL 2 TIMES DAILY
Status: DISCONTINUED | OUTPATIENT
Start: 2020-10-20 | End: 2020-10-23

## 2020-10-20 RX ORDER — SODIUM CHLORIDE, SODIUM LACTATE, POTASSIUM CHLORIDE, CALCIUM CHLORIDE 600; 310; 30; 20 MG/100ML; MG/100ML; MG/100ML; MG/100ML
INJECTION, SOLUTION INTRAVENOUS CONTINUOUS
Status: DISCONTINUED | OUTPATIENT
Start: 2020-10-20 | End: 2020-10-22

## 2020-10-20 RX ORDER — HYDROMORPHONE HYDROCHLORIDE 1 MG/ML
0.2 INJECTION, SOLUTION INTRAMUSCULAR; INTRAVENOUS; SUBCUTANEOUS EVERY 2 HOUR PRN
Status: DISCONTINUED | OUTPATIENT
Start: 2020-10-20 | End: 2020-10-23

## 2020-10-20 RX ORDER — SODIUM PHOSPHATE, DIBASIC AND SODIUM PHOSPHATE, MONOBASIC 7; 19 G/133ML; G/133ML
1 ENEMA RECTAL ONCE AS NEEDED
Status: DISCONTINUED | OUTPATIENT
Start: 2020-10-20 | End: 2020-10-23

## 2020-10-20 RX ORDER — HYDROMORPHONE HYDROCHLORIDE 1 MG/ML
0.5 INJECTION, SOLUTION INTRAMUSCULAR; INTRAVENOUS; SUBCUTANEOUS ONCE
Status: COMPLETED | OUTPATIENT
Start: 2020-10-20 | End: 2020-10-20

## 2020-10-20 RX ORDER — ONDANSETRON 2 MG/ML
4 INJECTION INTRAMUSCULAR; INTRAVENOUS EVERY 6 HOURS PRN
Status: DISCONTINUED | OUTPATIENT
Start: 2020-10-20 | End: 2020-10-23

## 2020-10-20 RX ORDER — ONDANSETRON 2 MG/ML
4 INJECTION INTRAMUSCULAR; INTRAVENOUS EVERY 6 HOURS PRN
Status: DISCONTINUED | OUTPATIENT
Start: 2020-10-20 | End: 2020-10-20

## 2020-10-20 RX ORDER — POLYETHYLENE GLYCOL 3350 17 G/17G
17 POWDER, FOR SOLUTION ORAL DAILY PRN
Status: DISCONTINUED | OUTPATIENT
Start: 2020-10-20 | End: 2020-10-23

## 2020-10-20 NOTE — ED INITIAL ASSESSMENT (HPI)
Pt presents to ed c/o ruq abdominal pain. Pt states she had a drain removed from cholecystectomy today. Pt states pain started to build once drain was removed. Pt also c/o nausea.

## 2020-10-20 NOTE — PLAN OF CARE
RECD FROM ER PER STRETCHER, ALERT ,AWAKE, ORIENTED. ABD ROUND, RUQ RED AROUND DRAIN SITE. DENIES N/V .KEEP NPO FOR NOW . INSTRUCTED USE IS X10 /HR WA . CALL LIGHT W/IN REACH TO CALL FOR ALL NEEDS AND ASSISTANCE

## 2020-10-20 NOTE — H&P
[facility]  History and Physical     Bhupendra Ka Patient Status:  Emergency    1990 MRN [id number]   Location [address] [facility] Attending Moise Lacey DO   Hosp Day # 0 PCP Drea Hawkins     Chief Complaint: pleuri Grandfather    • Hypertension Maternal Grandfather         Allergies: No Known Allergies    Medications:  No current facility-administered medications on file prior to encounter.      •  Amoxicillin-Pot Clavulanate (AUGMENTIN) 875-125 MG Oral Tab, Take 1 ta (H)).    No results for input(s): PTP, INR in the last 168 hours. No results for input(s): TROP, CK in the last 168 hours. Imaging: Imaging data reviewed in Epic.       ASSESSMENT / PLAN:     1. RUQ abdominal pain - recent lap soledad 10/12 with surgica

## 2020-10-20 NOTE — CONSULTS
[facility]  Report of  Surgical Consultation with History and Physical Exam    Alexander Major Patient Status:  Inpatient    1990 MRN [id number]   [facility] 3SW-A Attending Vanessa Hu # 0 PCP Americo Goldsmith postsurgical changes/contusion versus small biloma. The patient's past medical history is significant for asthma.   The patient's past surgical history is significant for laparoscopic appendectomy in February 2019 and laparoscopic cholecystectomy on 10/1 of patient's allergies performed.   Cardiovascular:  Negative for cool extremity and irregular heartbeat/palpitations  Constitutional:  Negative for decreased activity, fever, irritability and lethargy  Endocrine:  Negative for abnormal sleep patterns, incr on the dry gauze bandage. Extremities:  No lower extremity edema noted. Without clubbing or cyanosis. Skin: Skin surrounding previous drain site is erythematous and warm. Normal texture and turgor.       Laboratory Data and Relevant X-rays:  Recent PANCREAS:  No lesion, fluid collection, ductal dilatation, or atrophy. SPLEEN:  No enlargement or focal lesion. KIDNEYS:  No mass, obstruction, or calcification. ADRENALS:  No mass or enlargement. AORTA:  No aneurysm or dissection.     Cherylene Eaves represent postsurgical changes/contusion versus small biloma which would measure 2.1 x 1.5   cm.        Dictated by (CST): Renetta Baum MD on 10/20/2020 at 7:37 AM       Finalized by (CST): Renetta Baum MD on 10/20/2020 at 7:52 AM       No resul time spent with patient:  1 Hour    AZALIA Foss D.O.  10/20/2020  10:44 AM

## 2020-10-20 NOTE — PLAN OF CARE
CLEANSE DRAIN SITE WITH SALINE, RED AROUND THE WOUND, SLIGHTLY PUS IN MID WOUND, NO ODOR. COVER WITH GAUZE AFTER.

## 2020-10-20 NOTE — ED PROVIDER NOTES
Patient Seen in: [facility] Emergency Department      History   Patient presents with:  Postop/Procedure Problem    Stated Complaint: post op pain, drain removed today zaynab last monday    HPI    47-year-old female history of asthma presents to ED LMP 10/10/2020 (Exact Date)   SpO2 98%   BMI 38.96 kg/m²         Physical Exam  Vitals signs and nursing note reviewed. Exam conducted with a chaperone present. Constitutional:       Appearance: Normal appearance.    HENT:      Head: Normocephalic and atr Ratio 0.9 (*)     All other components within normal limits   LIPASE - Abnormal; Notable for the following components:    Lipase 48 (*)     All other components within normal limits   LACTIC ACID, PLASMA - Abnormal; Notable for the following components: Jeb.    Admission disposition: 10/20/2020  7:18 AM                   Disposition and Plan     Clinical Impression:  Fever, unspecified fever cause  (primary encounter diagnosis)  Abdominal pain, right upper quadrant    Disposition:  Admit  10/20/2020  7:1

## 2020-10-20 NOTE — PLAN OF CARE
PT C/O FEELING HUNGRY, AND HEADACHE FROM NOT EATING. NM SCAN RESULTS IN . WILL PAGE OC MD DR Radha Brandon FOR RESULTS,PT MADE AWARE OF IT.

## 2020-10-21 ENCOUNTER — ANESTHESIA EVENT (OUTPATIENT)
Dept: SURGERY | Facility: HOSPITAL | Age: 30
DRG: 856 | End: 2020-10-21
Payer: COMMERCIAL

## 2020-10-21 ENCOUNTER — ANESTHESIA (OUTPATIENT)
Dept: SURGERY | Facility: HOSPITAL | Age: 30
DRG: 856 | End: 2020-10-21
Payer: COMMERCIAL

## 2020-10-21 PROCEDURE — 0W9F0ZZ DRAINAGE OF ABDOMINAL WALL, OPEN APPROACH: ICD-10-PCS | Performed by: SURGERY

## 2020-10-21 PROCEDURE — 99232 SBSQ HOSP IP/OBS MODERATE 35: CPT | Performed by: INTERNAL MEDICINE

## 2020-10-21 RX ORDER — DEXAMETHASONE SODIUM PHOSPHATE 4 MG/ML
VIAL (ML) INJECTION AS NEEDED
Status: DISCONTINUED | OUTPATIENT
Start: 2020-10-21 | End: 2020-10-21 | Stop reason: SURG

## 2020-10-21 RX ORDER — MEPERIDINE HYDROCHLORIDE 25 MG/ML
12.5 INJECTION INTRAMUSCULAR; INTRAVENOUS; SUBCUTANEOUS AS NEEDED
Status: DISCONTINUED | OUTPATIENT
Start: 2020-10-21 | End: 2020-10-21 | Stop reason: HOSPADM

## 2020-10-21 RX ORDER — METOCLOPRAMIDE HYDROCHLORIDE 5 MG/ML
10 INJECTION INTRAMUSCULAR; INTRAVENOUS AS NEEDED
Status: DISCONTINUED | OUTPATIENT
Start: 2020-10-21 | End: 2020-10-21 | Stop reason: HOSPADM

## 2020-10-21 RX ORDER — HYDROMORPHONE HYDROCHLORIDE 1 MG/ML
0.4 INJECTION, SOLUTION INTRAMUSCULAR; INTRAVENOUS; SUBCUTANEOUS EVERY 5 MIN PRN
Status: DISCONTINUED | OUTPATIENT
Start: 2020-10-21 | End: 2020-10-21 | Stop reason: HOSPADM

## 2020-10-21 RX ORDER — HYDROCODONE BITARTRATE AND ACETAMINOPHEN 5; 325 MG/1; MG/1
1 TABLET ORAL EVERY 6 HOURS PRN
Status: DISCONTINUED | OUTPATIENT
Start: 2020-10-21 | End: 2020-10-23

## 2020-10-21 RX ORDER — MIDAZOLAM HYDROCHLORIDE 1 MG/ML
1 INJECTION INTRAMUSCULAR; INTRAVENOUS EVERY 5 MIN PRN
Status: DISCONTINUED | OUTPATIENT
Start: 2020-10-21 | End: 2020-10-21 | Stop reason: HOSPADM

## 2020-10-21 RX ORDER — LIDOCAINE HYDROCHLORIDE 10 MG/ML
INJECTION, SOLUTION EPIDURAL; INFILTRATION; INTRACAUDAL; PERINEURAL AS NEEDED
Status: DISCONTINUED | OUTPATIENT
Start: 2020-10-21 | End: 2020-10-21 | Stop reason: SURG

## 2020-10-21 RX ORDER — MIDAZOLAM HYDROCHLORIDE 1 MG/ML
INJECTION INTRAMUSCULAR; INTRAVENOUS
Status: COMPLETED
Start: 2020-10-21 | End: 2020-10-21

## 2020-10-21 RX ORDER — SODIUM CHLORIDE, SODIUM LACTATE, POTASSIUM CHLORIDE, CALCIUM CHLORIDE 600; 310; 30; 20 MG/100ML; MG/100ML; MG/100ML; MG/100ML
INJECTION, SOLUTION INTRAVENOUS CONTINUOUS
Status: DISCONTINUED | OUTPATIENT
Start: 2020-10-21 | End: 2020-10-21 | Stop reason: HOSPADM

## 2020-10-21 RX ORDER — NALOXONE HYDROCHLORIDE 0.4 MG/ML
80 INJECTION, SOLUTION INTRAMUSCULAR; INTRAVENOUS; SUBCUTANEOUS AS NEEDED
Status: DISCONTINUED | OUTPATIENT
Start: 2020-10-21 | End: 2020-10-21 | Stop reason: HOSPADM

## 2020-10-21 RX ORDER — DIPHENHYDRAMINE HYDROCHLORIDE 50 MG/ML
12.5 INJECTION INTRAMUSCULAR; INTRAVENOUS AS NEEDED
Status: DISCONTINUED | OUTPATIENT
Start: 2020-10-21 | End: 2020-10-21 | Stop reason: HOSPADM

## 2020-10-21 RX ORDER — ONDANSETRON 2 MG/ML
4 INJECTION INTRAMUSCULAR; INTRAVENOUS AS NEEDED
Status: DISCONTINUED | OUTPATIENT
Start: 2020-10-21 | End: 2020-10-21 | Stop reason: HOSPADM

## 2020-10-21 RX ORDER — BUPIVACAINE HYDROCHLORIDE 5 MG/ML
INJECTION, SOLUTION EPIDURAL; INTRACAUDAL AS NEEDED
Status: DISCONTINUED | OUTPATIENT
Start: 2020-10-21 | End: 2020-10-21 | Stop reason: HOSPADM

## 2020-10-21 RX ORDER — SODIUM CHLORIDE 9 MG/ML
INJECTION, SOLUTION INTRAVENOUS CONTINUOUS PRN
Status: DISCONTINUED | OUTPATIENT
Start: 2020-10-21 | End: 2020-10-21 | Stop reason: SURG

## 2020-10-21 RX ADMIN — SODIUM CHLORIDE: 9 INJECTION, SOLUTION INTRAVENOUS at 18:38:00

## 2020-10-21 RX ADMIN — DEXAMETHASONE SODIUM PHOSPHATE 4 MG: 4 MG/ML VIAL (ML) INJECTION at 18:05:00

## 2020-10-21 RX ADMIN — ONDANSETRON 4 MG: 2 INJECTION INTRAMUSCULAR; INTRAVENOUS at 18:05:00

## 2020-10-21 RX ADMIN — LIDOCAINE HYDROCHLORIDE 50 MG: 10 INJECTION, SOLUTION EPIDURAL; INFILTRATION; INTRACAUDAL; PERINEURAL at 17:52:00

## 2020-10-21 RX ADMIN — SODIUM CHLORIDE: 9 INJECTION, SOLUTION INTRAVENOUS at 18:30:00
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
## 2020-10-21 NOTE — ANESTHESIA POSTPROCEDURE EVALUATION
[address] Patient Status:  Inpatient   Age/Gender 31year old female MRN [id number]   Location [facility] Attending Ntiin Ma, [facility] Day # 1 PCP Milana Allen       Anesthesia Post-op Note    Proce

## 2020-10-21 NOTE — PROGRESS NOTES
[facility]  Progress Note    [facility] Patient Status:  Inpatient    1990 MRN [id number]   [facility] 3SW-A Attending Shirlene Chapman,    Hosp Day # 1 PCP Master Fallon     Subjective:  PT resting in bed.  Continues to have fever Obesity affecting pregnancy, antepartum     Encounter for supervision of normal pregnancy in second trimester     Irregular menstrual cycle     Dysphagia     Vaginal spotting     Right-sided back pain     Pregnancy     Acute appendicitis complicating pre allow more adequate drainage of the infection. Patient understands and agrees and is willing to proceed with the operation. .    My total face time with this patient was 30 minutes.   Greater than half of our visit was spent in counseling the patient on the

## 2020-10-21 NOTE — PAYOR COMM NOTE
--------------  ADMISSION REVIEW     Payor: Brayan Tan #:  [id number]  Authorization Number: [id number]    Admit date: 10/20/20  Admit time: 80       Admitting Physician: Chrissy Lovell DO  Attending Phys Social History    Tobacco Use      Smoking status: Never Smoker      Smokeless tobacco: Never Used    Alcohol use: No      Alcohol/week: 0.0 standard drinks    Drug use:  No             Review of Systems    Positive for stated complaint: post op pain, drain Musculoskeletal: Normal range of motion. General: No deformity. Right lower leg: No edema. Left lower leg: No edema. Skin:     General: Skin is warm and dry. Capillary Refill: Capillary refill takes less than 2 seconds.    Neurologi RAINBOW DRAW LIGHT GREEN   KASSIE DRAW GOLD   BLOOD CULTURE   BLOOD CULTURE   AEROBIC BACTERIAL CULTURE       ED Course as of Oct 20 0720  ------------------------------------------------------------  Time: 10/20 0718  Comment: Patient made low risk for C Chief Complaint: pleuritic chest pain, fevers    History of Present Illness: Mattie Rey is a 31year old female with pmhx significant for asthma and recent lap soledad 10/12 with surgical drain removed yesterday.  She was doing well post-discharge until Mathias •  Amoxicillin-Pot Clavulanate (AUGMENTIN) 875-125 MG Oral Tab, Take 1 tablet by mouth 2 (two) times daily for 7 days. , Disp: 14 tablet, Rfl: 0        Review of Systems:   A comprehensive 14 point review of systems was completed.     Pertinent positives and ASSESSMENT / PLAN:     1. RUQ abdominal pain - recent lap soledad 10/12 with surgical drain removal 10/19. CTAP w/ soft tissue stranding and inflammation w/o clear abscess  1. Surgery consulted  2. BL and Surgical site cultures obtained in ED  3.  Plan for HI The patient was seen in clinic yesterday for her postoperative follow-up after her laparoscopic cholecystectomy with intraoperative cholangiogram by on 10/12/2020. The patient states that following her surgery her abdominal pain was initially improving. •  SECTION N/A 3/27/2019     Performed by Javid Mcclendon MD at [facility] L[facility] OR   • CHOLECYSTECTOMY       • ESOPHAGOGASTRODUODENOSCOPY (EGD) N/A 2015     Performed by Rut Ruiz MD at [address] [place] [address] N/A 2019 Gastrointestinal: Positive for abdominal pain and nausea.   Negative for constipation, decreased appetite, diarrhea and vomiting  Genitourinary:  Negative for dysuria and hematuria  Hema/Lymph:  Negative for easy bleeding and easy bruising  Integumentary: .0             Recent Labs   Lab 10/20/20  0247   *   BUN 14   CREATSERUM 1.06*   GFRAA 83   GFRNAA 72   CA 8.8   ALB 3.7   *   K 3.9      CO2 26.0   ALKPHO 91   AST 40*   ALT 40   BILT 1.0   TP 7.8         CTA chest, abdomen, and BOWEL/MESENTERY:  No visible mass, obstruction, or bowel wall thickening.  Small amount of free fluid within the pelvis.  No bowel obstruction. Dorette Serina is some mild wall thickening involving the anti mesenteric surface of the hepatic flexure opposite to   t No results for input(s): PTP, INR, PTT in the last 168 hours.        Impression:  Patient Active Problem List:     Obesity affecting pregnancy, antepartum     Encounter for supervision of normal pregnancy in second trimester     Irregular menstrual cycle [facility]  Progress Note           Orvan Parcel Patient Status:  Inpatient    1990 MRN [id number]   [facility] 3SW-A Attending Irineo Gutiérrez DO   Hosp Day # 1 PCP Edgardo Grace      Subjective:  PT resting in bed.  Continues   TP 6.4 10/21/2020         Assessment:  Patient Active Problem List:     Obesity affecting pregnancy, antepartum     Encounter for supervision of normal pregnancy in second trimester     Irregular menstrual cycle     Dysphagia     Vaginal spotting     Rig Had been tolerating CLD but currently NPO for surgical debridement.     Review of Systems:   10 point ROS completed and was negative, except for pertinent positive and negatives stated in subjective.     Vital signs:  Temp:  [98.7 °F (37.1 °C)-101.5 °F (38 • enoxaparin  40 mg Subcutaneous Daily   • docusate sodium  100 mg Oral BID         ASSESSMENT / PLAN:         1. RUQ abdominal pain - recent lap soledad 10/12 with surgical drain removal 10/19.  CTAP w/ soft tissue stranding and inflammation w/o clear absces 10/21/2020 0328 Given 0.4 mg Intravenous Jorje Vallejo RN    10/20/2020 2338 Given 0.4 mg Intravenous Catherine Brownlee RN    10/20/2020 1825 Given 0.4 mg Intravenous Christian Lockett RN      lactated ringers infusion     Date Action Dose Route User    10/21/

## 2020-10-21 NOTE — PLAN OF CARE
Pt oriented on assessment, denies pain at this time. Pt tachycardic, fever this evening of 101.5 at highest. PRN tylenol administered, temp now at 99, will continue to monitor. IVF running per mar.  Redness, warmth, and scant purulent drainage noted to righ

## 2020-10-21 NOTE — PROGRESS NOTES
[facility]  Progress Note     Robina Kayser Patient Status:  Inpatient    1990 MRN [id number]   [facility] 3SW-A Attending Kitty Benavides, [address] [facility]note 3[facility] Day # 1 PCP Letty Ibrahim     Chief Complaint: RUQ pain, fevers    S: Patient rep Estimated Creatinine Clearance: 71.2 mL/min (based on SCr of 0.93 mg/dL). No results for input(s): PTP, INR in the last 168 hours. No results for input(s): TROP, CK in the last 168 hours. Imaging: Imaging data reviewed in Epic.     Medications

## 2020-10-22 PROCEDURE — 99232 SBSQ HOSP IP/OBS MODERATE 35: CPT | Performed by: INTERNAL MEDICINE

## 2020-10-22 NOTE — OPERATIVE REPORT
[address]    PATIENT'S NAME: Viridiana Chapman Daisy CALDERON   ATTENDING PHYSICIAN: Kamilla Chery.  Negrito Hughes DO   OPERATING PHYSICIAN: Andrés Boateng D.O.   PATIENT ACCOUNT#:   [de-identified]    LOCATION:  [address] [place] [address] #:   [id number]       DATE OF BIRTH:  [birth year]  t: 10/22/2020 05:01:48  Job [id number]  [place]/    cc: Derick Noonan D.O.

## 2020-10-22 NOTE — PROGRESS NOTES
[facility]  Progress Note    Alexander Major Patient Status:  Inpatient    1990 MRN [id number]   [facility] 3SW-A Attending Jocelin Potter, [address] [facility] Day # 2 PCP Americo Goldsmith     Subjective: The patient is resting in bed.  She states third trimester, antepartum      delivery delivered     Dental abscess     Anemia     Lactic acidosis     Intractable pain     Acute cholecystitis with chronic cholecystitis     Leukocytosis     Fever     Fever, unspecified fever cause     Abdomina

## 2020-10-22 NOTE — PAYOR COMM NOTE
--------------  CONTINUED STAY REVIEW    Payor: Brayan Tan #:  [id number]  Authorization Number: [id number]    Admit date: 10/20/20  Admit time: 0932    Admitting Physician: Akosua Kay DO  Attending Rangel AST 40* 49* 16   ALT 40 77* 59*   BILT 1.0 1.4 0.5   TP 7.8 6.4 6.9      Estimated Creatinine Clearance: 80.8 mL/min (based on SCr of 0.82 mg/dL).     No results for input(s): PTP, INR in the last 168 hours.     No results for input(s): TROP, CK in the l 100 mg Oral Ovidio Kapadia RN    10/21/2020 2048 Given 100 mg Oral Rick Lucas RN      Enoxaparin Sodium (LOVENOX) 40 MG/0.4ML injection 40 mg     Date Action Dose Route User    10/22/2020 0510 Given 40 mg Subcutaneous (Left Upper Arm) Leslie Hager Annie Carl RN    10/21/2020 1540 New Bag 3.375 g Intravenous Andre Scheuermann, RN      propofol (DIPRIVAN) injection     Date Action Dose Route User    10/21/2020 6674 Given 150 mg Intravenous Sydney Santoyo MD      0.9% NaCl infusion     Date Ac patient tolerated procedure well.     Dictated By Bird Renee D.O.  d:     10/21/2020 18:20:55

## 2020-10-22 NOTE — PLAN OF CARE
Right lower abdomen red,  warm, tender to touch. Small amount of serous drainage noted on old dressing. Site cleaned with normal saline and new dry sterile gauze dressing placed.   Instructed on plan of care, npo status, surgery later tonight per Dr. Tawnya Deshpande

## 2020-10-22 NOTE — PROGRESS NOTES
[facility]  Progress Note     Alexander Major Patient Status:  Inpatient    1990 MRN [id number]   [facility] 3SW-A Attending Jocelin Potter, [address] [facility] Day # 2 PCP Americo Goldsmith     Chief Complaint: RUQ pain, fevers    S: Patient fee mg/dL). No results for input(s): PTP, INR in the last 168 hours. No results for input(s): TROP, CK in the last 168 hours. Imaging: Imaging data reviewed in Epic.     Medications:   • piperacillin-tazobactam  3.375 g Intravenous Q8H   • pantoprazol

## 2020-10-22 NOTE — PLAN OF CARE
Pt states pain minimal.  Taking Norco prn with relief. RLQ dressing changed by surgery service this afternoon. Dressing dry and intact. Continues to receive IV antibiotic as ordered. Possible dc to home tomorrow. Will continue to monitor.

## 2020-10-22 NOTE — PLAN OF CARE
Received the patient back from PACU ,alert and awake ,has mild pain to right side of abdomen ,dressing clean and dry to rt abdomen ,vital signs stable ,on iv zosyn ,pain is controlled with norco ,encouraged use of incentive spirometer and ankle pumps ,enco

## 2020-10-22 NOTE — PAYOR COMM NOTE
--------------  CONTINUED STAY REVIEW    Payor: Brayan Tan #:  [id number]  Authorization Number: [id number]    Admit date: 10/20/20  Admit time: 0932    Admitting Physician: Cheng Del Castillo DO  Attending Rangel procedure well.     Dictated By Ricarda Coley D.O.  d:     10/21/2020 18:20:55        MEDICATIONS ADMINISTERED IN LAST 1 DAY:  acetaminophen (TYLENOL) tab 650 mg     Date Action Dose Route User    10/21/2020 1010 Given 650 mg Oral Nat Madrigal RN Monday, Demi RN      lidocaine PF (XYLOCAINE) 1% injection     Date Action Dose Route User    10/21/2020 1752 Given 50 mg Injection Yarely Santoyo MD      Midazolam HCl (VERSED) 2 MG/2ML injection 1 mg     Date Action Dose Route User    10/21/2020 184

## 2020-10-23 VITALS
OXYGEN SATURATION: 97 % | TEMPERATURE: 98 F | DIASTOLIC BLOOD PRESSURE: 64 MMHG | HEART RATE: 83 BPM | WEIGHT: 213 LBS | RESPIRATION RATE: 16 BRPM | BODY MASS INDEX: 39.2 KG/M2 | SYSTOLIC BLOOD PRESSURE: 121 MMHG | HEIGHT: 62 IN

## 2020-10-23 PROCEDURE — 99239 HOSP IP/OBS DSCHRG MGMT >30: CPT | Performed by: INTERNAL MEDICINE

## 2020-10-23 RX ORDER — HYDROCODONE BITARTRATE AND ACETAMINOPHEN 5; 325 MG/1; MG/1
1 TABLET ORAL EVERY 8 HOURS PRN
Qty: 10 TABLET | Refills: 0 | Status: SHIPPED | OUTPATIENT
Start: 2020-10-23 | End: 2021-01-07

## 2020-10-23 RX ORDER — LEVOFLOXACIN 750 MG/1
750 TABLET ORAL DAILY
Status: DISCONTINUED | OUTPATIENT
Start: 2020-10-23 | End: 2020-10-23

## 2020-10-23 RX ORDER — LEVOFLOXACIN 750 MG/1
750 TABLET ORAL DAILY
Qty: 6 TABLET | Refills: 0 | Status: SHIPPED | OUTPATIENT
Start: 2020-10-24 | End: 2020-11-02 | Stop reason: ALTCHOICE

## 2020-10-23 NOTE — PLAN OF CARE
Pt seen by surgeon this am.  Dressing changed. Antibiotic changed to PO. OK for dc to home today. Written and verbal dc instructions given to pt. Verbalized understanding. Waiting for pharmacy to deliver meds.

## 2020-10-23 NOTE — PLAN OF CARE
Patient has only minimal pain ,declined pain meds ,dressing to right lower abdomen clean and dry ,on iv zosyn ,remains afebrile ,encouraged use of incentive spirometer ,up ad mehnaz in room ,reminded to call for assist ,plan for possible home tomorrow.

## 2020-10-23 NOTE — PROGRESS NOTES
To whom it may concern,    Please excuse Chel Troy form any missed work or work obligations. Patient was hospitalized for a medical condition at [facility] in [address] from 10/20 - 10/23/2020    Patient may return to work on 10/24/2020.     Vivian Ag

## 2020-10-23 NOTE — DISCHARGE SUMMARY
[facility]  DISCHARGE SUMMARY     April Parisi Patient Status:  Inpatient    1990 MRN [id number]   [facility] 3SW-A Attending Maria E Stephen,    Hosp Day # 3 PCP Fely Garber     Date of Admission: 10/20/2020  Date of [address] [facility] • See above    Incidental or significant findings and recommendations (brief descriptions):  • n/a    Lab/Test results pending at Discharge:   · n/a    Consultants:  • General Surgery    Discharge Medication List:     Discharge Medications      START hever Abdomen: Soft, nontender, nondistended. Wounds, clean, dry  Positive bowel sounds. No rebound or guarding. Neurologic: No focal neurological deficits. Musculoskeletal: Moves all extremities. Extremities: No edema.   -----------------------------------

## 2020-10-23 NOTE — PROGRESS NOTES
[facility]  Progress Note    Evelio Slater Patient Status:  Inpatient    1990 MRN [id number]   [facility] 3SW-A Attending Panda Maravilla, DO   Hosp Day # 3 PCP Jorge Shaikh     Subjective: The patient is doing well.  She denies comp Hyponatremia      POD 2 wound exploration with drainage of abdominal wall abscess    Plan:  1. Continue soft diet  2. Transition to oral antibiotics  3. Irrigate wound twice daily with dry dressing. 4. Stable for discharge to home  5.  Follow-up with ANUP de la cruz

## 2020-11-02 ENCOUNTER — OFFICE VISIT (OUTPATIENT)
Dept: SURGERY | Facility: CLINIC | Age: 30
End: 2020-11-02

## 2020-11-02 VITALS
HEART RATE: 78 BPM | WEIGHT: 208 LBS | BODY MASS INDEX: 38 KG/M2 | SYSTOLIC BLOOD PRESSURE: 113 MMHG | DIASTOLIC BLOOD PRESSURE: 73 MMHG | TEMPERATURE: 97 F

## 2020-11-02 DIAGNOSIS — K80.50 BILIARY COLIC: ICD-10-CM

## 2020-11-02 DIAGNOSIS — K81.2 ACUTE CHOLECYSTITIS WITH CHRONIC CHOLECYSTITIS: Primary | ICD-10-CM

## 2020-11-02 DIAGNOSIS — Z90.49 S/P LAPAROSCOPIC CHOLECYSTECTOMY: ICD-10-CM

## 2020-11-02 DIAGNOSIS — R10.11 ABDOMINAL PAIN, RIGHT UPPER QUADRANT: ICD-10-CM

## 2020-11-02 DIAGNOSIS — T81.49XA ABDOMINAL WALL ABSCESS AT SITE OF SURGICAL WOUND: ICD-10-CM

## 2020-11-02 PROCEDURE — 3078F DIAST BP <80 MM HG: CPT | Performed by: PHYSICIAN ASSISTANT

## 2020-11-02 PROCEDURE — 3074F SYST BP LT 130 MM HG: CPT | Performed by: PHYSICIAN ASSISTANT

## 2020-11-02 PROCEDURE — 99024 POSTOP FOLLOW-UP VISIT: CPT | Performed by: PHYSICIAN ASSISTANT

## 2020-11-02 NOTE — PROGRESS NOTES
Post Operative Visit Note       Active Problems  1. Acute cholecystitis with chronic cholecystitis    2. Abdominal pain, right upper quadrant    3. Biliary colic    4. S/P laparoscopic cholecystectomy    5.  Abdominal wall abscess at site of surgical wound Performed by Ruy Villalta MD at [facility] MAIN OR   • LAPAROSCOPIC CHOLECYSTECTOMY     • LAPAROSCOPIC CHOLECYSTECTOMY N/A 10/12/2020    Performed by Tri Sosa DO at [facility] MAIN OR   • TONSILLECTOMY         The family history and social history have been r Musculoskeletal: Negative for arthralgias and myalgias. Skin: Negative for color change and rash. Neurological: Negative for tremors, syncope and weakness. Hematological: Negative for adenopathy. Does not bruise/bleed easily.    Psychiatric/Behavior chronic cholecystitis  (primary encounter diagnosis)  Abdominal pain, right upper quadrant  Biliary colic  S/P laparoscopic cholecystectomy  Abdominal wall abscess at site of surgical wound      Plan   Overall the patient is healing well following her inci

## 2020-11-04 ENCOUNTER — TELEPHONE (OUTPATIENT)
Dept: OBGYN CLINIC | Facility: CLINIC | Age: 30
End: 2020-11-04

## 2020-11-28 ENCOUNTER — ANESTHESIA EVENT (OUTPATIENT)
Dept: SURGERY | Facility: HOSPITAL | Age: 30
End: 2020-11-28
Payer: COMMERCIAL

## 2020-11-28 ENCOUNTER — ANESTHESIA (OUTPATIENT)
Dept: SURGERY | Facility: HOSPITAL | Age: 30
End: 2020-11-28
Payer: COMMERCIAL

## 2020-11-28 ENCOUNTER — HOSPITAL ENCOUNTER (EMERGENCY)
Facility: HOSPITAL | Age: 30
Discharge: HOME | End: 2020-11-29
Attending: EMERGENCY MEDICINE
Payer: COMMERCIAL

## 2020-11-28 DIAGNOSIS — T18.128A ESOPHAGEAL OBSTRUCTION DUE TO FOOD IMPACTION: Primary | ICD-10-CM

## 2020-11-28 DIAGNOSIS — K22.2 ESOPHAGEAL OBSTRUCTION DUE TO FOOD IMPACTION: Primary | ICD-10-CM
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
## 2020-11-28 PROCEDURE — 99285 EMERGENCY DEPT VISIT HI MDM: CPT

## 2020-11-28 PROCEDURE — 0DC58ZZ EXTIRPATION OF MATTER FROM ESOPHAGUS, VIA NATURAL OR ARTIFICIAL OPENING ENDOSCOPIC: ICD-10-PCS | Performed by: INTERNAL MEDICINE

## 2020-11-28 PROCEDURE — 88305 TISSUE EXAM BY PATHOLOGIST: CPT | Performed by: INTERNAL MEDICINE

## 2020-11-28 PROCEDURE — 81025 URINE PREGNANCY TEST: CPT

## 2020-11-28 PROCEDURE — 0DB28ZX EXCISION OF MIDDLE ESOPHAGUS, VIA NATURAL OR ARTIFICIAL OPENING ENDOSCOPIC, DIAGNOSTIC: ICD-10-PCS | Performed by: INTERNAL MEDICINE

## 2020-11-28 RX ORDER — HYDROCODONE BITARTRATE AND ACETAMINOPHEN 10; 325 MG/1; MG/1
2 TABLET ORAL AS NEEDED
Status: DISCONTINUED | OUTPATIENT
Start: 2020-11-28 | End: 2020-11-29

## 2020-11-28 RX ORDER — HYDROCODONE BITARTRATE AND ACETAMINOPHEN 10; 325 MG/1; MG/1
1 TABLET ORAL AS NEEDED
Status: DISCONTINUED | OUTPATIENT
Start: 2020-11-28 | End: 2020-11-29

## 2020-11-28 RX ORDER — ONDANSETRON 2 MG/ML
INJECTION INTRAMUSCULAR; INTRAVENOUS AS NEEDED
Status: DISCONTINUED | OUTPATIENT
Start: 2020-11-28 | End: 2020-11-28 | Stop reason: SURG

## 2020-11-28 RX ORDER — SODIUM CHLORIDE 9 MG/ML
INJECTION, SOLUTION INTRAVENOUS CONTINUOUS PRN
Status: DISCONTINUED | OUTPATIENT
Start: 2020-11-28 | End: 2020-11-28 | Stop reason: SURG

## 2020-11-28 RX ORDER — METOCLOPRAMIDE HYDROCHLORIDE 5 MG/ML
10 INJECTION INTRAMUSCULAR; INTRAVENOUS AS NEEDED
Status: DISCONTINUED | OUTPATIENT
Start: 2020-11-28 | End: 2020-11-29

## 2020-11-28 RX ORDER — MIDAZOLAM HYDROCHLORIDE 1 MG/ML
1 INJECTION INTRAMUSCULAR; INTRAVENOUS EVERY 5 MIN PRN
Status: DISCONTINUED | OUTPATIENT
Start: 2020-11-28 | End: 2020-11-29

## 2020-11-28 RX ORDER — HYDROMORPHONE HYDROCHLORIDE 1 MG/ML
0.4 INJECTION, SOLUTION INTRAMUSCULAR; INTRAVENOUS; SUBCUTANEOUS EVERY 5 MIN PRN
Status: DISCONTINUED | OUTPATIENT
Start: 2020-11-28 | End: 2020-11-29

## 2020-11-28 RX ORDER — NALOXONE HYDROCHLORIDE 0.4 MG/ML
80 INJECTION, SOLUTION INTRAMUSCULAR; INTRAVENOUS; SUBCUTANEOUS AS NEEDED
Status: DISCONTINUED | OUTPATIENT
Start: 2020-11-28 | End: 2020-11-29

## 2020-11-28 RX ORDER — MEPERIDINE HYDROCHLORIDE 25 MG/ML
12.5 INJECTION INTRAMUSCULAR; INTRAVENOUS; SUBCUTANEOUS AS NEEDED
Status: DISCONTINUED | OUTPATIENT
Start: 2020-11-28 | End: 2020-11-29

## 2020-11-28 RX ORDER — METOCLOPRAMIDE HYDROCHLORIDE 5 MG/ML
INJECTION INTRAMUSCULAR; INTRAVENOUS
Status: COMPLETED
Start: 2020-11-28 | End: 2020-11-28

## 2020-11-28 RX ORDER — MIDAZOLAM HYDROCHLORIDE 1 MG/ML
INJECTION INTRAMUSCULAR; INTRAVENOUS
Status: COMPLETED
Start: 2020-11-28 | End: 2020-11-28

## 2020-11-28 RX ORDER — LIDOCAINE HYDROCHLORIDE 10 MG/ML
INJECTION, SOLUTION EPIDURAL; INFILTRATION; INTRACAUDAL; PERINEURAL AS NEEDED
Status: DISCONTINUED | OUTPATIENT
Start: 2020-11-28 | End: 2020-11-28 | Stop reason: SURG

## 2020-11-28 RX ORDER — ONDANSETRON 2 MG/ML
4 INJECTION INTRAMUSCULAR; INTRAVENOUS AS NEEDED
Status: DISCONTINUED | OUTPATIENT
Start: 2020-11-28 | End: 2020-11-29

## 2020-11-28 RX ORDER — DEXAMETHASONE SODIUM PHOSPHATE 4 MG/ML
VIAL (ML) INJECTION AS NEEDED
Status: DISCONTINUED | OUTPATIENT
Start: 2020-11-28 | End: 2020-11-28 | Stop reason: SURG

## 2020-11-28 RX ORDER — SODIUM CHLORIDE, SODIUM LACTATE, POTASSIUM CHLORIDE, CALCIUM CHLORIDE 600; 310; 30; 20 MG/100ML; MG/100ML; MG/100ML; MG/100ML
INJECTION, SOLUTION INTRAVENOUS CONTINUOUS
Status: DISCONTINUED | OUTPATIENT
Start: 2020-11-28 | End: 2020-11-29

## 2020-11-28 RX ADMIN — LIDOCAINE HYDROCHLORIDE 50 MG: 10 INJECTION, SOLUTION EPIDURAL; INFILTRATION; INTRACAUDAL; PERINEURAL at 22:47:00

## 2020-11-28 RX ADMIN — SODIUM CHLORIDE: 9 INJECTION, SOLUTION INTRAVENOUS at 22:46:00

## 2020-11-28 RX ADMIN — DEXAMETHASONE SODIUM PHOSPHATE 4 MG: 4 MG/ML VIAL (ML) INJECTION at 23:05:00

## 2020-11-28 RX ADMIN — SODIUM CHLORIDE: 9 INJECTION, SOLUTION INTRAVENOUS at 23:32:00

## 2020-11-28 RX ADMIN — ONDANSETRON 4 MG: 2 INJECTION INTRAMUSCULAR; INTRAVENOUS at 23:05:00

## 2020-11-29 VITALS
WEIGHT: 209 LBS | OXYGEN SATURATION: 100 % | HEART RATE: 92 BPM | RESPIRATION RATE: 16 BRPM | DIASTOLIC BLOOD PRESSURE: 74 MMHG | SYSTOLIC BLOOD PRESSURE: 116 MMHG | BODY MASS INDEX: 38 KG/M2 | TEMPERATURE: 99 F

## 2020-11-29 NOTE — OPERATIVE REPORT
Operative Report-Esophagogastroduodenoscopy with biopsy removal of foreign body      PREOPERATIVE DIAGNOSIS/INDICATION:  1. Esophageal food impaction  POSTOPERTATIVE DIAGNOSIS:  1.  Impacted bolus in distal esophagus partially remov - Post EGD precautions, watch for bleeding, infection, perforation, adverse drug reactions   - Follow biopsies.  - Omeprazole 20mg po daily  - OV in 2-3 weeks at [facility] G[facility] with NP.  CC Report:     Melina Booker  11/28/2020  11:23 PM

## 2020-11-29 NOTE — CONSULTS
[facility]                       Gastroenterology [address] [facility] Gastroenterology    [address] Meera Urrutia Patient Status:  Emergency    1990 MRN [id number]   [facility] SURGERY Attending Daiv Mondragon MD Age of Onset   • Heart Disorder Maternal Grandfather    • Hypertension Maternal Grandfather       Medications:   •  lactated ringers infusion, , Intravenous, Continuous    •  HYDROcodone-acetaminophen (NORCO)  MG per tab 1 tablet, 1 tablet, Oral, PRN patient reports no history of chronic arthritis, myalgias, arthralgias            Genitourinary:  The patient reports no history of recurrent urinary tract infections, hematuria, dysuria, or nephrolithiasis           Psychiatric: +h/o anxiety           Onc COMPARISON:  [facility] , CT, CTA CHEST + CT ABD (W) + CT PEL (W) (CPT=71275/79812), 10/20/2020, 6:16 AM.     INDICATIONS:  lap soledad 10/12, post op pain     TECHNIQUE:  Tc99m labeled mebrofenin was injected IV, and dynamic images of the abdomen were obtai NRDR ([address] [place] [address] Rd) which includes the Dose Index Registry. PATIENT STATED HISTORY:(As transcribed by Technologist)  Patient complains of plueritic pain,RUQ abdominal pain and nausea post drain removal from cholecystectomy.       CON amount of free fluid within the pelvis. No bowel obstruction. There is some mild wall thickening involving the anti mesenteric surface of the hepatic flexure opposite to   the above described gallbladder fossa changes.   There is some thickening in the ri with recurrent food impaction, r/o underlying EoE    Recommendations:   1. Plan for urgent EGDin OR today.  The risks, benefits, alternatives of the procedure including the risks of anesthesia, bleeding, perforation, missed lesions, need for surgery were di

## 2020-11-29 NOTE — ED PROVIDER NOTES
Patient Seen in: [facility] Emergency Department      History   Patient presents with:  FB in Throat    Stated Complaint: FB to throat - had turkey at 2330; Speech clear, no drooling.  Not in respitator*    HPI    [employer] is a pleasant 30-year-old fema HPI.  Constitutional and vital signs reviewed. All other systems reviewed and negative except as noted above.     Physical Exam     ED Triage Vitals [11/28/20 2115]   /63   Pulse 95   Resp 16   Temp 98.1 °F (36.7 °C)   Temp src Temporal   SpO2 98 Prescribed:  Current Discharge Medication List

## 2020-11-29 NOTE — ANESTHESIA POSTPROCEDURE EVALUATION
[address] Patient Status:  Emergency   Age/Gender 31year old female MRN [id number]   [facility] SURGERY Attending Brenda Guzman, [address] [facility] [address] Day # 0 PCP Jorge Shaikh       Anesthesia Post-op Note    Procedure(s):  E

## 2020-11-29 NOTE — ANESTHESIA PROCEDURE NOTES
Airway  Date/Time: 11/28/2020 10:49 PM  Urgency: elective    Airway not difficult    General Information and Staff    Patient location during procedure: OR  Anesthesiologist: Yolie Evans MD  Performed: anesthesiologist     Indications and Patient Co

## 2020-11-29 NOTE — ANESTHESIA PREPROCEDURE EVALUATION
PRE-OP EVALUATION    Patient Name: Rosana Travis    Pre-op Diagnosis: Esophageal obstruction due to food impaction [K22.2, S82.879C]    Procedure(s):  ESOPHAGOGASTRODUODENOSCOPY (EGD)    Surgeon(s) and Role:     Chelle Bedolla MD - Primary    Pre- Component Value Date    WBC 10.6 10/22/2020    RBC 3.71 (L) 10/22/2020    HGB 10.6 (L) 10/22/2020    HCT 31.5 (L) 10/22/2020    MCV 84.9 10/22/2020    MCH 28.6 10/22/2020    MCHC 33.7 10/22/2020    RDW 13.2 10/22/2020    .0 10/22/2020     Lab Resu

## 2020-12-14 ENCOUNTER — TELEPHONE (OUTPATIENT)
Dept: OBGYN CLINIC | Facility: CLINIC | Age: 30
End: 2020-12-14

## 2020-12-14 NOTE — TELEPHONE ENCOUNTER
Patient calling to initiate prenatal care  LMP 11/9/20  Patient is 7-8 weeks on 1/4/20  Confirmation Ultrasound and Appointment scheduled on   Future Appointments   Date Time Provider Toro Garcia   12/15/2020 11:00 AM Millie Dennison MD [facility] 20 E

## 2020-12-14 NOTE — TELEPHONE ENCOUNTER
LMP: 2020    EDC based on lmp: 2021    8 wks on 2021     - patient had 1 SAB, 1 D&E, and her child born in 2019 is .      Are cycles regular?: Yes    Medical problems: None    Past sx hx: Cholecystectomy Oct 2020, Appendectomy

## 2021-01-07 ENCOUNTER — ANESTHESIA EVENT (OUTPATIENT)
Dept: SURGERY | Facility: HOSPITAL | Age: 31
End: 2021-01-07
Payer: COMMERCIAL

## 2021-01-07 ENCOUNTER — TELEPHONE (OUTPATIENT)
Dept: OBGYN CLINIC | Facility: CLINIC | Age: 31
End: 2021-01-07

## 2021-01-07 ENCOUNTER — OFFICE VISIT (OUTPATIENT)
Dept: OBGYN CLINIC | Facility: CLINIC | Age: 31
End: 2021-01-07
Payer: COMMERCIAL

## 2021-01-07 ENCOUNTER — ULTRASOUND ENCOUNTER (OUTPATIENT)
Dept: OBGYN CLINIC | Facility: CLINIC | Age: 31
End: 2021-01-07
Payer: COMMERCIAL

## 2021-01-07 VITALS
DIASTOLIC BLOOD PRESSURE: 70 MMHG | WEIGHT: 208 LBS | SYSTOLIC BLOOD PRESSURE: 120 MMHG | RESPIRATION RATE: 16 BRPM | BODY MASS INDEX: 38 KG/M2 | HEART RATE: 75 BPM

## 2021-01-07 DIAGNOSIS — N91.2 AMENORRHEA: Primary | ICD-10-CM

## 2021-01-07 PROCEDURE — 3074F SYST BP LT 130 MM HG: CPT | Performed by: OBSTETRICS & GYNECOLOGY

## 2021-01-07 PROCEDURE — 3078F DIAST BP <80 MM HG: CPT | Performed by: OBSTETRICS & GYNECOLOGY

## 2021-01-07 PROCEDURE — 76830 TRANSVAGINAL US NON-OB: CPT | Performed by: OBSTETRICS & GYNECOLOGY

## 2021-01-07 PROCEDURE — 99213 OFFICE O/P EST LOW 20 MIN: CPT | Performed by: OBSTETRICS & GYNECOLOGY

## 2021-01-07 NOTE — PROGRESS NOTES
Patient is 32 y/o [id number]  Had delivery 2019 at 32 weeks, PIH, Dr. Hardik Ybarra (Insurance)  Regular menses, 28-29 days  LMP: 11/09/2020   =  8w3d      PMH: neg  PSH: Cholecystectomy 10/2020  NKDA  Meds   none  ETOH: none  TOB: none      Ram: Kory Hutson

## 2021-01-07 NOTE — TELEPHONE ENCOUNTER
Surgery scheduled for 1/8/2021 at 3:45PM  Post op   Future Appointments   Date Time Provider Toro Garcia   1/18/2021  5:00 PM Yvonne Crenshaw MD [facility] OB/GYN O [facility]   1/22/2021  9:00 AM Lamar Arreaga MD [facility] OB/GYN O [facility]     Orders faxed

## 2021-01-08 ENCOUNTER — HOSPITAL ENCOUNTER (OUTPATIENT)
Facility: HOSPITAL | Age: 31
Setting detail: HOSPITAL OUTPATIENT SURGERY
Discharge: HOME | End: 2021-01-08
Attending: OBSTETRICS & GYNECOLOGY | Admitting: OBSTETRICS & GYNECOLOGY
Payer: COMMERCIAL

## 2021-01-08 ENCOUNTER — ANESTHESIA (OUTPATIENT)
Dept: SURGERY | Facility: HOSPITAL | Age: 31
End: 2021-01-08
Payer: COMMERCIAL

## 2021-01-08 VITALS
BODY MASS INDEX: 38.38 KG/M2 | WEIGHT: 208.56 LBS | OXYGEN SATURATION: 99 % | SYSTOLIC BLOOD PRESSURE: 112 MMHG | HEART RATE: 71 BPM | HEIGHT: 62 IN | TEMPERATURE: 98 F | DIASTOLIC BLOOD PRESSURE: 71 MMHG | RESPIRATION RATE: 16 BRPM

## 2021-01-08 DIAGNOSIS — O02.1 MISSED AB: Primary | ICD-10-CM

## 2021-01-08 PROBLEM — N93.9 VAGINAL SPOTTING: Status: RESOLVED | Noted: 2018-05-25 | Resolved: 2021-01-08

## 2021-01-08 PROBLEM — Z3A.32 32 WEEKS GESTATION OF PREGNANCY (HCC): Status: RESOLVED | Noted: 2019-03-27 | Resolved: 2021-01-08

## 2021-01-08 PROBLEM — Z34.90 PREGNANCY: Status: RESOLVED | Noted: 2019-01-31 | Resolved: 2021-01-08

## 2021-01-08 PROBLEM — K35.80 ACUTE APPENDICITIS: Status: RESOLVED | Noted: 2019-02-01 | Resolved: 2021-01-08

## 2021-01-08 PROBLEM — Z34.90 PREGNANCY (HCC): Status: RESOLVED | Noted: 2019-01-31 | Resolved: 2021-01-08

## 2021-01-08 PROBLEM — Z3A.32 32 WEEKS GESTATION OF PREGNANCY: Status: RESOLVED | Noted: 2019-03-27 | Resolved: 2021-01-08

## 2021-01-08 LAB
DEPRECATED RDW RBC AUTO: 42.9 FL (ref 35.1–46.3)
ERYTHROCYTE [DISTWIDTH] IN BLOOD BY AUTOMATED COUNT: 13.9 % (ref 11–15)
HCT VFR BLD AUTO: 36.7 %
HGB BLD-MCNC: 12.4 G/DL
MCH RBC QN AUTO: 28.4 PG (ref 26–34)
MCHC RBC AUTO-ENTMCNC: 33.8 G/DL (ref 31–37)
MCV RBC AUTO: 84.2 FL
PLATELET # BLD AUTO: 252 10(3)UL (ref 150–450)
RBC # BLD AUTO: 4.36 X10(6)UL
SARS-COV-2 RNA RESP QL NAA+PROBE: NOT DETECTED
WBC # BLD AUTO: 9 X10(3) UL (ref 4–11)

## 2021-01-08 PROCEDURE — 10D17ZZ EXTRACTION OF PRODUCTS OF CONCEPTION, RETAINED, VIA NATURAL OR ARTIFICIAL OPENING: ICD-10-PCS | Performed by: OBSTETRICS & GYNECOLOGY

## 2021-01-08 PROCEDURE — 59820 CARE OF MISCARRIAGE: CPT | Performed by: OBSTETRICS & GYNECOLOGY

## 2021-01-08 RX ORDER — METOCLOPRAMIDE HYDROCHLORIDE 5 MG/ML
10 INJECTION INTRAMUSCULAR; INTRAVENOUS AS NEEDED
Status: CANCELLED | OUTPATIENT
Start: 2021-01-08 | End: 2021-01-09

## 2021-01-08 RX ORDER — NALOXONE HYDROCHLORIDE 0.4 MG/ML
80 INJECTION, SOLUTION INTRAMUSCULAR; INTRAVENOUS; SUBCUTANEOUS AS NEEDED
Status: CANCELLED | OUTPATIENT
Start: 2021-01-08 | End: 2021-01-09

## 2021-01-08 RX ORDER — ACETAMINOPHEN 500 MG
500 TABLET ORAL EVERY 6 HOURS PRN
COMMUNITY
End: 2021-04-28

## 2021-01-08 RX ORDER — SODIUM CHLORIDE, SODIUM LACTATE, POTASSIUM CHLORIDE, CALCIUM CHLORIDE 600; 310; 30; 20 MG/100ML; MG/100ML; MG/100ML; MG/100ML
INJECTION, SOLUTION INTRAVENOUS CONTINUOUS
Status: CANCELLED | OUTPATIENT
Start: 2021-01-08

## 2021-01-08 RX ORDER — HYDROMORPHONE HYDROCHLORIDE 1 MG/ML
0.4 INJECTION, SOLUTION INTRAMUSCULAR; INTRAVENOUS; SUBCUTANEOUS EVERY 5 MIN PRN
Status: CANCELLED | OUTPATIENT
Start: 2021-01-08 | End: 2021-01-09

## 2021-01-08 RX ORDER — IBUPROFEN 600 MG/1
600 TABLET ORAL ONCE AS NEEDED
Status: CANCELLED | OUTPATIENT
Start: 2021-01-08 | End: 2021-01-08

## 2021-01-08 RX ORDER — LIDOCAINE HYDROCHLORIDE 10 MG/ML
INJECTION, SOLUTION EPIDURAL; INFILTRATION; INTRACAUDAL; PERINEURAL AS NEEDED
Status: DISCONTINUED | OUTPATIENT
Start: 2021-01-08 | End: 2021-01-08 | Stop reason: SURG

## 2021-01-08 RX ORDER — MIDAZOLAM HYDROCHLORIDE 1 MG/ML
INJECTION INTRAMUSCULAR; INTRAVENOUS AS NEEDED
Status: DISCONTINUED | OUTPATIENT
Start: 2021-01-08 | End: 2021-01-08 | Stop reason: SURG

## 2021-01-08 RX ORDER — KETOROLAC TROMETHAMINE 30 MG/ML
INJECTION, SOLUTION INTRAMUSCULAR; INTRAVENOUS AS NEEDED
Status: DISCONTINUED | OUTPATIENT
Start: 2021-01-08 | End: 2021-01-08 | Stop reason: SURG

## 2021-01-08 RX ORDER — SODIUM CHLORIDE, SODIUM LACTATE, POTASSIUM CHLORIDE, CALCIUM CHLORIDE 600; 310; 30; 20 MG/100ML; MG/100ML; MG/100ML; MG/100ML
INJECTION, SOLUTION INTRAVENOUS CONTINUOUS
Status: DISCONTINUED | OUTPATIENT
Start: 2021-01-08 | End: 2021-01-08

## 2021-01-08 RX ORDER — ACETAMINOPHEN 500 MG
1000 TABLET ORAL ONCE
Status: DISCONTINUED | OUTPATIENT
Start: 2021-01-08 | End: 2021-01-08 | Stop reason: HOSPADM

## 2021-01-08 RX ORDER — MIDAZOLAM HYDROCHLORIDE 1 MG/ML
1 INJECTION INTRAMUSCULAR; INTRAVENOUS EVERY 5 MIN PRN
Status: CANCELLED | OUTPATIENT
Start: 2021-01-08 | End: 2021-01-09

## 2021-01-08 RX ORDER — ONDANSETRON 2 MG/ML
4 INJECTION INTRAMUSCULAR; INTRAVENOUS AS NEEDED
Status: CANCELLED | OUTPATIENT
Start: 2021-01-08 | End: 2021-01-09

## 2021-01-08 RX ADMIN — KETOROLAC TROMETHAMINE 30 MG: 30 INJECTION, SOLUTION INTRAMUSCULAR; INTRAVENOUS at 16:23:00

## 2021-01-08 RX ADMIN — LIDOCAINE HYDROCHLORIDE 50 MG: 10 INJECTION, SOLUTION EPIDURAL; INFILTRATION; INTRACAUDAL; PERINEURAL at 16:06:00

## 2021-01-08 RX ADMIN — SODIUM CHLORIDE, SODIUM LACTATE, POTASSIUM CHLORIDE, CALCIUM CHLORIDE: 600; 310; 30; 20 INJECTION, SOLUTION INTRAVENOUS at 16:32:00

## 2021-01-08 RX ADMIN — MIDAZOLAM HYDROCHLORIDE 2 MG: 1 INJECTION INTRAMUSCULAR; INTRAVENOUS at 16:03:00

## 2021-01-08 NOTE — ANESTHESIA POSTPROCEDURE EVALUATION
[address] Patient Status:  Hospital Outpatient Surgery   Age/Gender 31year old female MRN [id number]   [facility] SURGERY Attending Tamie Garber MD   [facility] Day # 0 PCP Master Fallon       Anesthesia Post-op Note    Proce

## 2021-01-08 NOTE — H&P
[address] [facility]  Obstetrics and Gynecology  Gynecologic History & Physical    Jace Fisher Patient Status:  Hospital Outpatient Surgery    1990 MRN [id number]   [facility] SURGERY Attending Pro Rees Day 0 PCP Reginald Vera DO at [facility] MAIN OR   • TONSILLECTOMY         Medications:  No medications prior to admission.       Allergies:  No Known Allergies     Past GYN History:    as above    Social History:  Social History    Tobacco Use      Smoking status: Never S with need for hospitalization or additional surgery. All questions answered and patient agrees to the  proposed surgery without reservation.     Patient Active Problem List:     Obesity affecting pregnancy, antepartum     Encounter for supervision of marialuisa

## 2021-01-08 NOTE — OPERATIVE REPORT
Pre-Operative Diagnosis: Missed , 8 weeks     Post-Operative Diagnosis: Same     Procedure Performed:   Suction dilation and curettage    Surgeon(s) and Role:     Ha Hughes MD - Primary    Assistant(s):   none     Surgical Findings: cervix 1

## 2021-01-08 NOTE — ANESTHESIA PREPROCEDURE EVALUATION
PRE-OP EVALUATION    Patient Name: Alban Mcghee    Pre-op Diagnosis: MISSED     Procedure(s):  SUCTION DILATION AND CURETTAGE    Surgeon(s) and Role:     Cory Walsh MD - Primary    Pre-op vitals reviewed.   Pulse: 75  Resp: 16  BP: 120/70 standard drinks      Drug use: No     Available pre-op labs reviewed.   Lab Results   Component Value Date    WBC 10.6 10/22/2020    RBC 3.71 (L) 10/22/2020    HGB 10.6 (L) 10/22/2020    HCT 31.5 (L) 10/22/2020    MCV 84.9 10/22/2020    MCH 28.6 10/22/2020

## 2021-01-12 ENCOUNTER — TELEPHONE (OUTPATIENT)
Dept: OBGYN UNIT | Facility: HOSPITAL | Age: 31
End: 2021-01-12

## 2021-01-18 ENCOUNTER — TELEPHONE (OUTPATIENT)
Dept: OBGYN CLINIC | Facility: CLINIC | Age: 31
End: 2021-01-18

## 2021-01-18 NOTE — TELEPHONE ENCOUNTER
Pt was no show for her appt today with Dr. Christensen Player in [facility]. Called pt and she totally forgot.  Pt will call to R/S

## 2021-04-06 ENCOUNTER — TELEPHONE (OUTPATIENT)
Dept: OBGYN CLINIC | Facility: CLINIC | Age: 31
End: 2021-04-06

## 2021-04-06 DIAGNOSIS — O09.299 HISTORY OF MISCARRIAGE, CURRENTLY PREGNANT: Primary | ICD-10-CM

## 2021-04-06 NOTE — TELEPHONE ENCOUNTER
First available appt for our ultrasound with [facility].     Patient calling to initiate prenatal care  LMP  2/11/21  Patient is 7-8 weeks    Confirmation Ultrasound and Appointment scheduled on 4/28/21  Insurance [employer] PPO  Good time to return phone call   [phone]

## 2021-04-06 NOTE — TELEPHONE ENCOUNTER
; history of one ; one C section at 32 wks for HELLP syndrome,  and 3 SAB. Pt taking PNV; no other meds. Pt feeling well at this time; mild nausea. Pt denies spotting, bleeding or pain. Labs ordered per protocol.   Pt advised to call with any sp

## 2021-04-06 NOTE — TELEPHONE ENCOUNTER
Patient called, she had trouble getting thru pregnancy in the passed, so she wants to know if she should come in for any test or blood test sooner. She was late getting her period and she did a pregnancy test about 3 wks ago and it was positive.  She waited

## 2021-04-07 ENCOUNTER — LAB ENCOUNTER (OUTPATIENT)
Dept: LAB | Age: 31
End: 2021-04-07
Attending: OBSTETRICS & GYNECOLOGY
Payer: COMMERCIAL

## 2021-04-07 DIAGNOSIS — O09.299 HISTORY OF MISCARRIAGE, CURRENTLY PREGNANT: ICD-10-CM

## 2021-04-07 PROCEDURE — 84144 ASSAY OF PROGESTERONE: CPT

## 2021-04-07 PROCEDURE — 84702 CHORIONIC GONADOTROPIN TEST: CPT

## 2021-04-07 PROCEDURE — 36415 COLL VENOUS BLD VENIPUNCTURE: CPT

## 2021-04-09 ENCOUNTER — LAB ENCOUNTER (OUTPATIENT)
Dept: LAB | Age: 31
End: 2021-04-09
Attending: OBSTETRICS & GYNECOLOGY
Payer: COMMERCIAL

## 2021-04-09 DIAGNOSIS — O09.299 HISTORY OF MISCARRIAGE, CURRENTLY PREGNANT: ICD-10-CM

## 2021-04-09 PROCEDURE — 84702 CHORIONIC GONADOTROPIN TEST: CPT

## 2021-04-09 PROCEDURE — 36415 COLL VENOUS BLD VENIPUNCTURE: CPT

## 2021-04-27 PROBLEM — Z87.59 HISTORY OF GESTATIONAL HYPERTENSION: Status: ACTIVE | Noted: 2021-04-27

## 2021-04-27 PROBLEM — Z87.59 HISTORY OF PRE-ECLAMPSIA: Status: ACTIVE | Noted: 2021-04-27

## 2021-04-27 PROBLEM — O16.3 HIGH BLOOD PRESSURE AFFECTING PREGNANCY IN THIRD TRIMESTER, ANTEPARTUM: Status: RESOLVED | Noted: 2019-03-27 | Resolved: 2021-04-27

## 2021-04-27 PROBLEM — Z98.891 PREVIOUS CESAREAN SECTION: Status: ACTIVE | Noted: 2019-03-27

## 2021-04-27 PROBLEM — M54.9 RIGHT-SIDED BACK PAIN: Status: RESOLVED | Noted: 2018-05-25 | Resolved: 2021-04-27

## 2021-04-27 PROBLEM — R50.9 FEVER: Status: RESOLVED | Noted: 2020-10-20 | Resolved: 2021-04-27

## 2021-04-27 PROBLEM — D64.9 ANEMIA: Status: RESOLVED | Noted: 2020-10-12 | Resolved: 2021-04-27

## 2021-04-27 PROBLEM — R79.89 ELEVATED SERUM CREATININE: Status: RESOLVED | Noted: 2019-03-27 | Resolved: 2021-04-27

## 2021-04-27 PROBLEM — R10.11 ABDOMINAL PAIN, RIGHT UPPER QUADRANT: Status: RESOLVED | Noted: 2020-10-20 | Resolved: 2021-04-27

## 2021-04-27 PROBLEM — K81.2 ACUTE CHOLECYSTITIS WITH CHRONIC CHOLECYSTITIS: Status: RESOLVED | Noted: 2020-10-19 | Resolved: 2021-04-27

## 2021-04-27 PROBLEM — R50.9 FEVER, UNSPECIFIED FEVER CAUSE: Status: RESOLVED | Noted: 2020-10-20 | Resolved: 2021-04-27

## 2021-04-27 PROBLEM — K04.7 DENTAL ABSCESS: Status: RESOLVED | Noted: 2020-04-09 | Resolved: 2021-04-27

## 2021-04-27 PROBLEM — D72.829 LEUKOCYTOSIS: Status: RESOLVED | Noted: 2020-10-20 | Resolved: 2021-04-27

## 2021-04-27 PROBLEM — K80.50 BILIARY COLIC: Status: RESOLVED | Noted: 2019-03-27 | Resolved: 2021-04-27

## 2021-04-27 PROBLEM — R52 INTRACTABLE PAIN: Status: RESOLVED | Noted: 2020-10-12 | Resolved: 2021-04-27

## 2021-04-27 PROBLEM — E87.20 LACTIC ACIDOSIS: Status: RESOLVED | Noted: 2020-10-12 | Resolved: 2021-04-27

## 2021-04-27 PROBLEM — E87.2 LACTIC ACIDOSIS: Status: RESOLVED | Noted: 2020-10-12 | Resolved: 2021-04-27

## 2021-04-27 PROBLEM — O16.3 HIGH BLOOD PRESSURE AFFECTING PREGNANCY IN THIRD TRIMESTER, ANTEPARTUM (HCC): Status: RESOLVED | Noted: 2019-03-27 | Resolved: 2021-04-27

## 2021-04-28 ENCOUNTER — OFFICE VISIT (OUTPATIENT)
Dept: OBGYN CLINIC | Facility: CLINIC | Age: 31
End: 2021-04-28
Payer: COMMERCIAL

## 2021-04-28 ENCOUNTER — ULTRASOUND ENCOUNTER (OUTPATIENT)
Dept: OBGYN CLINIC | Facility: CLINIC | Age: 31
End: 2021-04-28
Payer: COMMERCIAL

## 2021-04-28 VITALS
RESPIRATION RATE: 16 BRPM | WEIGHT: 208 LBS | DIASTOLIC BLOOD PRESSURE: 78 MMHG | HEART RATE: 72 BPM | HEIGHT: 62 IN | SYSTOLIC BLOOD PRESSURE: 110 MMHG | BODY MASS INDEX: 38.28 KG/M2

## 2021-04-28 DIAGNOSIS — Z98.891 PREVIOUS CESAREAN SECTION: ICD-10-CM

## 2021-04-28 DIAGNOSIS — N91.2 AMENORRHEA: Primary | ICD-10-CM

## 2021-04-28 DIAGNOSIS — O99.210 OBESITY AFFECTING PREGNANCY, ANTEPARTUM: ICD-10-CM

## 2021-04-28 PROBLEM — Z34.90 PREGNANCY: Status: ACTIVE | Noted: 2021-04-28

## 2021-04-28 PROBLEM — Z34.90 PREGNANCY (HCC): Status: ACTIVE | Noted: 2021-04-28

## 2021-04-28 PROCEDURE — 99213 OFFICE O/P EST LOW 20 MIN: CPT | Performed by: OBSTETRICS & GYNECOLOGY

## 2021-04-28 PROCEDURE — 3008F BODY MASS INDEX DOCD: CPT | Performed by: OBSTETRICS & GYNECOLOGY

## 2021-04-28 PROCEDURE — 76856 US EXAM PELVIC COMPLETE: CPT | Performed by: OBSTETRICS & GYNECOLOGY

## 2021-04-28 PROCEDURE — 3074F SYST BP LT 130 MM HG: CPT | Performed by: OBSTETRICS & GYNECOLOGY

## 2021-04-28 PROCEDURE — 3078F DIAST BP <80 MM HG: CPT | Performed by: OBSTETRICS & GYNECOLOGY

## 2021-04-28 RX ORDER — CHOLECALCIFEROL (VITAMIN D3) 25 MCG
1 TABLET,CHEWABLE ORAL DAILY
COMMUNITY

## 2021-04-28 NOTE — PATIENT INSTRUCTIONS
[facility]- Prenatal Testing    The following information is designed to help you understand some of the optional tests that are available to you to screen for problems in your pregnancy.  Before considering any of these tests, you will need to number below: Please verify insurance coverage:  CPT code: 34816 (alexandra) or 19998 (twins)  Diagnosis: Genetic Screening- Z36.8A  Maternal Fetal Medicine  Dr. Lucie Bryson, Dr. Dorrine Hammans, Dr. Willi Arzola and Dr. Mora Cantu [facility] [address] [place], [address] [facility] [address]  Diagnosis code: Genetic screening- Z36.8A    [5] Alpha Fetoprotein (AFP, MSAFP)- This is a simple blood test that screens for neural tube defects such as spina bifida (an abnormal opening in the spine). It is usually performed around 16-20 weeks.  Ad test, Quad Screen and Cystic Fibrosis test should be in your prenatal packet. Your doctor will discuss this information in more detail, and feel free to ask additional questions.  Also, remember that all of these tests are optional, and will only be perform different brand of vitamin. Patients who have problems with one brand of vitamin often find that a different brand works better.   Second, try taking the vitamin at a different time of day, or splitting it in half and taking half in the morning and half at

## 2021-04-28 NOTE — PROGRESS NOTES
Subjective:  Patient presents complaining of no menses. Positive home pregnancy test.  LMP 21. Previous pregnancies  followed by CS for early preeclampsia. No  labor or diabetes.   No family history of any inheritable diseases or congenita

## 2021-04-29 ENCOUNTER — LAB ENCOUNTER (OUTPATIENT)
Dept: LAB | Age: 31
End: 2021-04-29
Attending: OBSTETRICS & GYNECOLOGY
Payer: COMMERCIAL

## 2021-04-29 DIAGNOSIS — O99.210 OBESITY AFFECTING PREGNANCY, ANTEPARTUM: ICD-10-CM

## 2021-04-29 PROCEDURE — 82950 GLUCOSE TEST: CPT | Performed by: OBSTETRICS & GYNECOLOGY

## 2021-04-29 PROCEDURE — 84443 ASSAY THYROID STIM HORMONE: CPT | Performed by: OBSTETRICS & GYNECOLOGY

## 2021-04-30 DIAGNOSIS — R79.89 ABNORMAL TSH: Primary | ICD-10-CM

## 2021-05-11 ENCOUNTER — PATIENT MESSAGE (OUTPATIENT)
Dept: OBGYN CLINIC | Facility: CLINIC | Age: 31
End: 2021-05-11

## 2021-05-11 ENCOUNTER — MED REC SCAN ONLY (OUTPATIENT)
Dept: OBGYN CLINIC | Facility: CLINIC | Age: 31
End: 2021-05-11

## 2021-05-11 PROBLEM — O99.341 DEPRESSION DURING PREGNANCY IN FIRST TRIMESTER (HCC): Status: ACTIVE | Noted: 2021-05-11

## 2021-05-11 PROBLEM — F32.A DEPRESSION DURING PREGNANCY IN FIRST TRIMESTER: Status: ACTIVE | Noted: 2021-05-11

## 2021-05-11 PROBLEM — F32.A DEPRESSION DURING PREGNANCY IN FIRST TRIMESTER (HCC): Status: ACTIVE | Noted: 2021-05-11

## 2021-05-11 PROBLEM — O99.341 DEPRESSION DURING PREGNANCY IN FIRST TRIMESTER: Status: ACTIVE | Noted: 2021-05-11

## 2021-05-11 NOTE — TELEPHONE ENCOUNTER
G6/P 1131 GA 12 5/7 wks patient complaining of depression. She is not currently taking any medication for this, but is seeing a therapist. However, that therapist does not have a license that would allow prescribing medication.     EPDS: 22     Last OV: 4/2

## 2021-05-11 NOTE — TELEPHONE ENCOUNTER
From: Cabrera Gil  To: Megan Mathis MD  Sent: 5/11/2021 11:16 AM CDT  Subject: Prescription Question    Hi Dr. Smith Son-  I was wondering if there are any safe antidepressants during pregnancy and of so would you be able to write a script for them?  My dep

## 2021-05-12 ENCOUNTER — TELEPHONE (OUTPATIENT)
Dept: OBGYN CLINIC | Facility: CLINIC | Age: 31
End: 2021-05-12

## 2021-05-12 NOTE — TELEPHONE ENCOUNTER
TC to patient last night and this morning. Message left for patient. Will call again later this morning.

## 2021-05-12 NOTE — TELEPHONE ENCOUNTER
Seeing therapist and referred to psychiatrist but has not seen them yet. Therapist recommended starting medication in the meantime. Good support at home. Never treated before. Recommend zoloft 50 mg daily.   Discussed possible increased risk of congenit

## 2021-05-12 NOTE — TELEPHONE ENCOUNTER
Received patient response form from [facility].     Pt has appt w/ Holly Prudent     Future Appointments   Date Time Provider Toro Garcia   5/20/2021 11:15 AM LUIS ALBERTO [facility] P [facility] [address]

## 2021-05-13 ENCOUNTER — MED REC SCAN ONLY (OUTPATIENT)
Dept: OBGYN CLINIC | Facility: CLINIC | Age: 31
End: 2021-05-13

## 2021-05-18 ENCOUNTER — LAB ENCOUNTER (OUTPATIENT)
Dept: LAB | Age: 31
End: 2021-05-18
Attending: OBSTETRICS & GYNECOLOGY
Payer: COMMERCIAL

## 2021-05-18 DIAGNOSIS — R79.89 ABNORMAL TSH: ICD-10-CM

## 2021-05-18 PROCEDURE — 84443 ASSAY THYROID STIM HORMONE: CPT | Performed by: OBSTETRICS & GYNECOLOGY

## 2021-05-20 ENCOUNTER — INITIAL PRENATAL (OUTPATIENT)
Dept: OBGYN CLINIC | Facility: CLINIC | Age: 31
End: 2021-05-20
Payer: COMMERCIAL

## 2021-05-20 VITALS
HEIGHT: 62 IN | DIASTOLIC BLOOD PRESSURE: 64 MMHG | BODY MASS INDEX: 36.77 KG/M2 | HEART RATE: 80 BPM | SYSTOLIC BLOOD PRESSURE: 122 MMHG | WEIGHT: 199.81 LBS

## 2021-05-20 DIAGNOSIS — Z87.59 HISTORY OF PRE-ECLAMPSIA: ICD-10-CM

## 2021-05-20 DIAGNOSIS — Z34.81 ENCOUNTER FOR SUPERVISION OF OTHER NORMAL PREGNANCY IN FIRST TRIMESTER: Primary | ICD-10-CM

## 2021-05-20 DIAGNOSIS — Z11.3 SCREEN FOR STD (SEXUALLY TRANSMITTED DISEASE): ICD-10-CM

## 2021-05-20 DIAGNOSIS — Z12.4 CERVICAL CANCER SCREENING: ICD-10-CM

## 2021-05-20 PROCEDURE — 85025 COMPLETE CBC W/AUTO DIFF WBC: CPT | Performed by: NURSE PRACTITIONER

## 2021-05-20 PROCEDURE — 3074F SYST BP LT 130 MM HG: CPT | Performed by: NURSE PRACTITIONER

## 2021-05-20 PROCEDURE — 87186 SC STD MICRODIL/AGAR DIL: CPT | Performed by: NURSE PRACTITIONER

## 2021-05-20 PROCEDURE — 87340 HEPATITIS B SURFACE AG IA: CPT | Performed by: NURSE PRACTITIONER

## 2021-05-20 PROCEDURE — 87077 CULTURE AEROBIC IDENTIFY: CPT | Performed by: NURSE PRACTITIONER

## 2021-05-20 PROCEDURE — 87624 HPV HI-RISK TYP POOLED RSLT: CPT | Performed by: NURSE PRACTITIONER

## 2021-05-20 PROCEDURE — 87491 CHLMYD TRACH DNA AMP PROBE: CPT | Performed by: NURSE PRACTITIONER

## 2021-05-20 PROCEDURE — 86780 TREPONEMA PALLIDUM: CPT | Performed by: NURSE PRACTITIONER

## 2021-05-20 PROCEDURE — 86901 BLOOD TYPING SEROLOGIC RH(D): CPT | Performed by: NURSE PRACTITIONER

## 2021-05-20 PROCEDURE — 86850 RBC ANTIBODY SCREEN: CPT | Performed by: NURSE PRACTITIONER

## 2021-05-20 PROCEDURE — 81002 URINALYSIS NONAUTO W/O SCOPE: CPT | Performed by: NURSE PRACTITIONER

## 2021-05-20 PROCEDURE — 3078F DIAST BP <80 MM HG: CPT | Performed by: NURSE PRACTITIONER

## 2021-05-20 PROCEDURE — 86762 RUBELLA ANTIBODY: CPT | Performed by: NURSE PRACTITIONER

## 2021-05-20 PROCEDURE — 87086 URINE CULTURE/COLONY COUNT: CPT | Performed by: NURSE PRACTITIONER

## 2021-05-20 PROCEDURE — 87591 N.GONORRHOEAE DNA AMP PROB: CPT | Performed by: NURSE PRACTITIONER

## 2021-05-20 PROCEDURE — 87389 HIV-1 AG W/HIV-1&-2 AB AG IA: CPT | Performed by: NURSE PRACTITIONER

## 2021-05-20 PROCEDURE — 86900 BLOOD TYPING SEROLOGIC ABO: CPT | Performed by: NURSE PRACTITIONER

## 2021-05-20 PROCEDURE — 3008F BODY MASS INDEX DOCD: CPT | Performed by: NURSE PRACTITIONER

## 2021-05-20 NOTE — PROGRESS NOTES
Here for initial prenatal visit with our group. 31year old [id number] at 14w0d by LMP    Patient's last menstrual period was 02/11/2021 (approximate).      Last pap smear was in 2017 and it was normal.    She has a history of a fetal demise at 19 weeks, a 13 IGG; Future  - HIV AG AB COMBO; Future  - URINE CULTURE, ROUTINE; Future  - PRENATAL SCREEN  - CBC WITH DIFFERENTIAL WITH PLATELET  - HEPATITIS B SURFACE ANTIGEN  - T PALLIDUM SCREENING CASCADE  - RUBELLA, IGG  - HIV AG AB COMBO  - URINE CULTURE, ROUTINE

## 2021-05-26 RX ORDER — NITROFURANTOIN 25; 75 MG/1; MG/1
100 CAPSULE ORAL 2 TIMES DAILY
Qty: 14 CAPSULE | Refills: 0 | Status: SHIPPED | OUTPATIENT
Start: 2021-05-26 | End: 2021-06-02

## 2021-05-26 NOTE — PROGRESS NOTES
Patient informed of results. Verbalized understanding. Medication sent to pharmacy. No further questions or concerns.

## 2021-06-01 NOTE — PROGRESS NOTES
Copy to pap pool. Call to patient; no answer. Left message on VM requesting call back to office for test results.

## 2021-06-01 NOTE — PROGRESS NOTES
Contacted patient. Reported results and discussed colposcopy. Questions answered and patient states understanding. Transferred to [facility] staff to assist with appt.

## 2021-06-18 ENCOUNTER — TELEPHONE (OUTPATIENT)
Dept: PERINATAL CARE | Facility: HOSPITAL | Age: 31
End: 2021-06-18

## 2021-06-18 ENCOUNTER — ROUTINE PRENATAL (OUTPATIENT)
Dept: OBGYN CLINIC | Facility: CLINIC | Age: 31
End: 2021-06-18
Payer: COMMERCIAL

## 2021-06-18 ENCOUNTER — LAB ENCOUNTER (OUTPATIENT)
Dept: LAB | Age: 31
End: 2021-06-18
Attending: OBSTETRICS & GYNECOLOGY
Payer: COMMERCIAL

## 2021-06-18 VITALS — DIASTOLIC BLOOD PRESSURE: 62 MMHG | BODY MASS INDEX: 38 KG/M2 | SYSTOLIC BLOOD PRESSURE: 120 MMHG | WEIGHT: 208.63 LBS

## 2021-06-18 DIAGNOSIS — Z87.59 HISTORY OF PRE-ECLAMPSIA: ICD-10-CM

## 2021-06-18 DIAGNOSIS — R87.810 ASCUS WITH POSITIVE HIGH RISK HPV CERVICAL: ICD-10-CM

## 2021-06-18 DIAGNOSIS — R87.610 ASCUS WITH POSITIVE HIGH RISK HPV CERVICAL: ICD-10-CM

## 2021-06-18 DIAGNOSIS — O99.210 OBESITY AFFECTING PREGNANCY, ANTEPARTUM: ICD-10-CM

## 2021-06-18 DIAGNOSIS — Z3A.18 18 WEEKS GESTATION OF PREGNANCY: Primary | ICD-10-CM

## 2021-06-18 PROBLEM — Z30.2 REQUEST FOR STERILIZATION: Status: ACTIVE | Noted: 2021-06-18

## 2021-06-18 PROCEDURE — 81002 URINALYSIS NONAUTO W/O SCOPE: CPT | Performed by: OBSTETRICS & GYNECOLOGY

## 2021-06-18 PROCEDURE — 3074F SYST BP LT 130 MM HG: CPT | Performed by: OBSTETRICS & GYNECOLOGY

## 2021-06-18 PROCEDURE — 84156 ASSAY OF PROTEIN URINE: CPT | Performed by: OBSTETRICS & GYNECOLOGY

## 2021-06-18 PROCEDURE — 57452 EXAM OF CERVIX W/SCOPE: CPT | Performed by: OBSTETRICS & GYNECOLOGY

## 2021-06-18 PROCEDURE — 3078F DIAST BP <80 MM HG: CPT | Performed by: OBSTETRICS & GYNECOLOGY

## 2021-06-18 PROCEDURE — 82570 ASSAY OF URINE CREATININE: CPT | Performed by: OBSTETRICS & GYNECOLOGY

## 2021-06-18 PROCEDURE — 80053 COMPREHEN METABOLIC PANEL: CPT

## 2021-06-18 PROCEDURE — 36415 COLL VENOUS BLD VENIPUNCTURE: CPT

## 2021-06-18 NOTE — PROGRESS NOTES
Patient presents with:  Colposcopy: ECHO/COLPO    Routine prenatal visit without complaints. Patient denies any bleeding, leaking fluid, cramping, or contractions. Good fetal movement.   Assessment/Plan:  18w1d doing well  Inc. BMI- referral Level 2  ASCUS

## 2021-06-18 NOTE — PROCEDURES
Colposcopy Procedure Note    Diagnosis and Indication: ASCUS pos HPV    Procedure Details: The risks including infection and bleeding were explained to the patient and verbal informed consent obtained.   Patient's last menstrual period was 02/11/2021 (nato

## 2021-06-18 NOTE — PATIENT INSTRUCTIONS
You have been referred to Maternal Fetal Medicine (High Risk OB/Perinatology) for an obstetrical consultation. Dr. Ibeth Ibrahim will need to call to make an appointment.     [address] [facility]

## 2021-07-16 ENCOUNTER — ROUTINE PRENATAL (OUTPATIENT)
Dept: OBGYN CLINIC | Facility: CLINIC | Age: 31
End: 2021-07-16
Payer: COMMERCIAL

## 2021-07-16 VITALS — SYSTOLIC BLOOD PRESSURE: 104 MMHG | WEIGHT: 209 LBS | DIASTOLIC BLOOD PRESSURE: 64 MMHG | BODY MASS INDEX: 38 KG/M2

## 2021-07-16 DIAGNOSIS — Z34.90 PRENATAL CARE, ANTEPARTUM: Primary | ICD-10-CM

## 2021-07-16 LAB
GLUCOSE (URINE DIPSTICK): NEGATIVE MG/DL
MULTISTIX LOT#: NORMAL NUMERIC
PROTEIN (URINE DIPSTICK): NEGATIVE MG/DL

## 2021-07-16 PROCEDURE — 81002 URINALYSIS NONAUTO W/O SCOPE: CPT | Performed by: NURSE PRACTITIONER

## 2021-07-16 PROCEDURE — 3074F SYST BP LT 130 MM HG: CPT | Performed by: NURSE PRACTITIONER

## 2021-07-16 PROCEDURE — 3078F DIAST BP <80 MM HG: CPT | Performed by: NURSE PRACTITIONER

## 2021-07-16 NOTE — PROGRESS NOTES
ECHO  Doing well  FM is good  Discussed stretching/ heat/ ice for low back pain  RH positive  S/P Anatomy scan with MFM yesterday  1 hr glucose/ CBC ordered  TDAP recommended  RTC 4wks

## 2021-07-30 ENCOUNTER — LAB ENCOUNTER (OUTPATIENT)
Dept: LAB | Age: 31
End: 2021-07-30
Attending: NURSE PRACTITIONER
Payer: COMMERCIAL

## 2021-07-30 DIAGNOSIS — Z34.90 PRENATAL CARE, ANTEPARTUM: ICD-10-CM

## 2021-07-30 LAB
BASOPHILS # BLD AUTO: 0.02 X10(3) UL (ref 0–0.2)
BASOPHILS NFR BLD AUTO: 0.3 %
EOSINOPHIL # BLD AUTO: 0.17 X10(3) UL (ref 0–0.7)
EOSINOPHIL NFR BLD AUTO: 2.1 %
ERYTHROCYTE [DISTWIDTH] IN BLOOD BY AUTOMATED COUNT: 16.4 %
GLUCOSE 1H P GLC SERPL-MCNC: 115 MG/DL
HCT VFR BLD AUTO: 30.5 %
HGB BLD-MCNC: 9.7 G/DL
IMM GRANULOCYTES # BLD AUTO: 0.05 X10(3) UL (ref 0–1)
IMM GRANULOCYTES NFR BLD: 0.6 %
LYMPHOCYTES # BLD AUTO: 2.04 X10(3) UL (ref 1–4)
LYMPHOCYTES NFR BLD AUTO: 25.8 %
MCH RBC QN AUTO: 29 PG (ref 26–34)
MCHC RBC AUTO-ENTMCNC: 31.8 G/DL (ref 31–37)
MCV RBC AUTO: 91 FL
MONOCYTES # BLD AUTO: 0.39 X10(3) UL (ref 0.1–1)
MONOCYTES NFR BLD AUTO: 4.9 %
NEUTROPHILS # BLD AUTO: 5.25 X10 (3) UL (ref 1.5–7.7)
NEUTROPHILS # BLD AUTO: 5.25 X10(3) UL (ref 1.5–7.7)
NEUTROPHILS NFR BLD AUTO: 66.3 %
PLATELET # BLD AUTO: 218 10(3)UL (ref 150–450)
RBC # BLD AUTO: 3.35 X10(6)UL
WBC # BLD AUTO: 7.9 X10(3) UL (ref 4–11)

## 2021-07-30 PROCEDURE — 36415 COLL VENOUS BLD VENIPUNCTURE: CPT

## 2021-07-30 PROCEDURE — 82950 GLUCOSE TEST: CPT

## 2021-07-30 PROCEDURE — 85025 COMPLETE CBC W/AUTO DIFF WBC: CPT

## 2021-08-10 ENCOUNTER — ROUTINE PRENATAL (OUTPATIENT)
Dept: OBGYN CLINIC | Facility: CLINIC | Age: 31
End: 2021-08-10
Payer: COMMERCIAL

## 2021-08-10 VITALS — BODY MASS INDEX: 37 KG/M2 | DIASTOLIC BLOOD PRESSURE: 62 MMHG | SYSTOLIC BLOOD PRESSURE: 98 MMHG | WEIGHT: 204.63 LBS

## 2021-08-10 DIAGNOSIS — Z30.2 REQUEST FOR STERILIZATION: ICD-10-CM

## 2021-08-10 DIAGNOSIS — Z87.59 HISTORY OF PRE-ECLAMPSIA: ICD-10-CM

## 2021-08-10 DIAGNOSIS — O99.019 ANTEPARTUM ANEMIA: ICD-10-CM

## 2021-08-10 DIAGNOSIS — Z34.90 PRENATAL CARE, ANTEPARTUM: Primary | ICD-10-CM

## 2021-08-10 DIAGNOSIS — Z98.891 PREVIOUS CESAREAN SECTION: ICD-10-CM

## 2021-08-10 DIAGNOSIS — O99.210 OBESITY AFFECTING PREGNANCY, ANTEPARTUM: ICD-10-CM

## 2021-08-10 PROBLEM — Z84.89: Status: ACTIVE | Noted: 2021-08-10

## 2021-08-10 LAB
GLUCOSE (URINE DIPSTICK): NEGATIVE MG/DL
MULTISTIX LOT#: NORMAL NUMERIC

## 2021-08-10 PROCEDURE — 81002 URINALYSIS NONAUTO W/O SCOPE: CPT | Performed by: OBSTETRICS & GYNECOLOGY

## 2021-08-10 PROCEDURE — 3074F SYST BP LT 130 MM HG: CPT | Performed by: OBSTETRICS & GYNECOLOGY

## 2021-08-10 PROCEDURE — 3078F DIAST BP <80 MM HG: CPT | Performed by: OBSTETRICS & GYNECOLOGY

## 2021-08-10 RX ORDER — MELATONIN
325
COMMUNITY
End: 2021-11-15

## 2021-08-10 NOTE — PATIENT INSTRUCTIONS
[address] [employer]-A-r[employer]T has had significant outbreaks of pertussis (whooping cough) for the last few years.   Therefore, the CDC recommends that all pregnant women receive a Tdap vaccine during pregnancy, regardless of whether you the vaccine, with no increase risk of miscarriage.      Here are some facts to consider when you are making a decision:   [1] Pregnant women are at higher risk for acquiring Covid-19 infection and have a subsequent higher risk for the following:  - severe i

## 2021-08-10 NOTE — PROGRESS NOTES
ECHO--25w5d    Doing well. Denies Vb, LOF, contractions. Positive fetal movement. Taking iron daily but doesn't like the taste    A/P:   1. FHT-P  2. PNL: 1 hour normal. HIV at next visit  3. Anemia of pregnancy: we reviewed rationale for taking.  Advise r

## 2021-08-12 ENCOUNTER — TELEPHONE (OUTPATIENT)
Dept: OBGYN CLINIC | Facility: CLINIC | Age: 31
End: 2021-08-12

## 2021-08-12 DIAGNOSIS — Z20.822 ENCOUNTER FOR PREOPERATIVE SCREENING LABORATORY TESTING FOR COVID-19 VIRUS: Primary | ICD-10-CM

## 2021-08-12 DIAGNOSIS — Z01.812 ENCOUNTER FOR PREOPERATIVE SCREENING LABORATORY TESTING FOR COVID-19 VIRUS: Primary | ICD-10-CM

## 2021-08-12 NOTE — TELEPHONE ENCOUNTER
----- Message from Bill Peck MD sent at 8/10/2021  5:10 PM CDT -----  Please schedule RCS and bilateral salpingectomy for ~39 weeks  Her ERNESTINA is 11/18/2021  Thank you!

## 2021-09-09 ENCOUNTER — LAB ENCOUNTER (OUTPATIENT)
Dept: LAB | Age: 31
End: 2021-09-09
Attending: OBSTETRICS & GYNECOLOGY
Payer: COMMERCIAL

## 2021-09-09 DIAGNOSIS — O99.210 OBESITY AFFECTING PREGNANCY, ANTEPARTUM: ICD-10-CM

## 2021-09-09 DIAGNOSIS — O99.019 ANTEPARTUM ANEMIA: ICD-10-CM

## 2021-09-09 LAB
BASOPHILS # BLD AUTO: 0.01 X10(3) UL (ref 0–0.2)
BASOPHILS NFR BLD AUTO: 0.1 %
DEPRECATED HBV CORE AB SER IA-ACNC: 4.1 NG/ML
EOSINOPHIL # BLD AUTO: 0.19 X10(3) UL (ref 0–0.7)
EOSINOPHIL NFR BLD AUTO: 2.5 %
ERYTHROCYTE [DISTWIDTH] IN BLOOD BY AUTOMATED COUNT: 14.9 %
HCT VFR BLD AUTO: 28 %
HGB BLD-MCNC: 8.9 G/DL
IMM GRANULOCYTES # BLD AUTO: 0.06 X10(3) UL (ref 0–1)
IMM GRANULOCYTES NFR BLD: 0.8 %
LYMPHOCYTES # BLD AUTO: 2.31 X10(3) UL (ref 1–4)
LYMPHOCYTES NFR BLD AUTO: 30.6 %
MCH RBC QN AUTO: 28.7 PG (ref 26–34)
MCHC RBC AUTO-ENTMCNC: 31.8 G/DL (ref 31–37)
MCV RBC AUTO: 90.3 FL
MONOCYTES # BLD AUTO: 0.46 X10(3) UL (ref 0.1–1)
MONOCYTES NFR BLD AUTO: 6.1 %
NEUTROPHILS # BLD AUTO: 4.52 X10 (3) UL (ref 1.5–7.7)
NEUTROPHILS # BLD AUTO: 4.52 X10(3) UL (ref 1.5–7.7)
NEUTROPHILS NFR BLD AUTO: 59.9 %
PLATELET # BLD AUTO: 206 10(3)UL (ref 150–450)
RBC # BLD AUTO: 3.1 X10(6)UL
WBC # BLD AUTO: 7.6 X10(3) UL (ref 4–11)

## 2021-09-09 PROCEDURE — 82728 ASSAY OF FERRITIN: CPT

## 2021-09-09 PROCEDURE — 87389 HIV-1 AG W/HIV-1&-2 AB AG IA: CPT

## 2021-09-09 PROCEDURE — 36415 COLL VENOUS BLD VENIPUNCTURE: CPT

## 2021-09-09 PROCEDURE — 85025 COMPLETE CBC W/AUTO DIFF WBC: CPT

## 2021-09-10 DIAGNOSIS — O99.019 ANTEPARTUM ANEMIA: Primary | ICD-10-CM

## 2021-09-10 NOTE — PROGRESS NOTES
Patient informed of results and recommendations. Verbalized understanding. No further questions or concerns.

## 2021-09-17 NOTE — PROGRESS NOTES
THE [facility] Hematology and Oncology Clinic Note    Diagnosis: Antepartum anemia    Treatment History:   IV Venofer pending.      Visit Diagnosis:  Anemia, unspecified type  (primary encounter diagnosis)    History of Present Illness: 31F with a PMH of asthma and • OTHER SURGICAL HISTORY N/A 5/21/2015    Procedure: ESOPHAGOGASTRODUODENOSCOPY (EGD);   Surgeon: Gely Leblanc MD;  Location: [address] [place] [address] OR   • TONSILLECTOMY       Social History    Socioeconomic History      Marital status:     Tobacco Use the previous drainage catheter extending to the skin surface.  There is no definite abscess. Judy Gold is some mild inflammatory wall thickening of the 2nd portion of the duodenum and hepatic flexure with mild ileus of the colon. Judy Gold is some fluid in the

## 2021-09-21 ENCOUNTER — OFFICE VISIT (OUTPATIENT)
Dept: HEMATOLOGY/ONCOLOGY | Facility: HOSPITAL | Age: 31
End: 2021-09-21
Attending: INTERNAL MEDICINE
Payer: COMMERCIAL

## 2021-09-21 VITALS
HEIGHT: 62.01 IN | WEIGHT: 209 LBS | HEART RATE: 91 BPM | TEMPERATURE: 98 F | BODY MASS INDEX: 37.97 KG/M2 | OXYGEN SATURATION: 100 % | RESPIRATION RATE: 16 BRPM | SYSTOLIC BLOOD PRESSURE: 117 MMHG | DIASTOLIC BLOOD PRESSURE: 74 MMHG

## 2021-09-21 DIAGNOSIS — D64.9 ANEMIA, UNSPECIFIED TYPE: Primary | ICD-10-CM

## 2021-09-21 LAB
ALBUMIN SERPL-MCNC: 2.4 G/DL (ref 3.4–5)
ALBUMIN/GLOB SERPL: 0.6 {RATIO} (ref 1–2)
ALP LIVER SERPL-CCNC: 78 U/L
ALT SERPL-CCNC: 20 U/L
ANION GAP SERPL CALC-SCNC: 8 MMOL/L (ref 0–18)
AST SERPL-CCNC: 15 U/L (ref 15–37)
BILIRUB SERPL-MCNC: 0.5 MG/DL (ref 0.1–2)
BUN BLD-MCNC: 8 MG/DL (ref 7–18)
CALCIUM BLD-MCNC: 8.4 MG/DL (ref 8.5–10.1)
CHLORIDE SERPL-SCNC: 110 MMOL/L (ref 98–112)
CO2 SERPL-SCNC: 20 MMOL/L (ref 21–32)
CREAT BLD-MCNC: 0.62 MG/DL
FOLATE SERPL-MCNC: 5.6 NG/ML (ref 8.7–?)
GLOBULIN PLAS-MCNC: 3.9 G/DL (ref 2.8–4.4)
GLUCOSE BLD-MCNC: 102 MG/DL (ref 70–99)
OSMOLALITY SERPL CALC.SUM OF ELEC: 285 MOSM/KG (ref 275–295)
PATIENT FASTING Y/N/NP: NO
POTASSIUM SERPL-SCNC: 3.8 MMOL/L (ref 3.5–5.1)
PROT SERPL-MCNC: 6.3 G/DL (ref 6.4–8.2)
SODIUM SERPL-SCNC: 138 MMOL/L (ref 136–145)
VIT B12 SERPL-MCNC: 175 PG/ML (ref 193–986)

## 2021-09-21 PROCEDURE — 99245 OFF/OP CONSLTJ NEW/EST HI 55: CPT | Performed by: INTERNAL MEDICINE

## 2021-09-21 RX ORDER — FOLIC ACID 1 MG/1
1 TABLET ORAL DAILY
Qty: 30 TABLET | Refills: 0 | Status: SHIPPED | OUTPATIENT
Start: 2021-09-21 | End: 2021-11-15

## 2021-09-21 RX ORDER — CYANOCOBALAMIN 1000 UG/ML
1000 INJECTION INTRAMUSCULAR; SUBCUTANEOUS ONCE
Status: CANCELLED | OUTPATIENT
Start: 2021-09-21

## 2021-09-21 NOTE — PROGRESS NOTES
Education Record    Learner:  Patient    Disease / Ardath Nguyen    Barriers / Limitations:  None   Comments:    Method:  Discussion   Comments:    General Topics:  Plan of care reviewed   Comments:    Outcome:  Shows understanding   Comments:    Ramos

## 2021-09-22 ENCOUNTER — ROUTINE PRENATAL (OUTPATIENT)
Dept: OBGYN CLINIC | Facility: CLINIC | Age: 31
End: 2021-09-22
Payer: COMMERCIAL

## 2021-09-22 VITALS — SYSTOLIC BLOOD PRESSURE: 110 MMHG | DIASTOLIC BLOOD PRESSURE: 64 MMHG | WEIGHT: 210 LBS | BODY MASS INDEX: 38 KG/M2

## 2021-09-22 DIAGNOSIS — Z3A.31 31 WEEKS GESTATION OF PREGNANCY: Primary | ICD-10-CM

## 2021-09-22 DIAGNOSIS — Z23 NEED FOR VACCINATION: ICD-10-CM

## 2021-09-22 LAB
GLUCOSE (URINE DIPSTICK): NEGATIVE MG/DL
MULTISTIX LOT#: NORMAL NUMERIC

## 2021-09-22 PROCEDURE — 81002 URINALYSIS NONAUTO W/O SCOPE: CPT | Performed by: OBSTETRICS & GYNECOLOGY

## 2021-09-22 PROCEDURE — 90715 TDAP VACCINE 7 YRS/> IM: CPT | Performed by: OBSTETRICS & GYNECOLOGY

## 2021-09-22 PROCEDURE — 90471 IMMUNIZATION ADMIN: CPT | Performed by: OBSTETRICS & GYNECOLOGY

## 2021-09-22 PROCEDURE — 3074F SYST BP LT 130 MM HG: CPT | Performed by: OBSTETRICS & GYNECOLOGY

## 2021-09-22 PROCEDURE — 3078F DIAST BP <80 MM HG: CPT | Performed by: OBSTETRICS & GYNECOLOGY

## 2021-09-22 NOTE — PROGRESS NOTES
Patient presents with:  Prenatal Care    Routine prenatal visit without complaints. Patient denies any bleeding, leaking fluid, cramping, or regular uterine contractions. Good fetal movement.   Assessment/Plan:  31w6d doing well  Elizabeth yang reminded  Prev

## 2021-09-28 ENCOUNTER — OFFICE VISIT (OUTPATIENT)
Dept: HEMATOLOGY/ONCOLOGY | Facility: HOSPITAL | Age: 31
End: 2021-09-28
Attending: INTERNAL MEDICINE
Payer: COMMERCIAL

## 2021-09-28 VITALS
RESPIRATION RATE: 16 BRPM | TEMPERATURE: 97 F | HEIGHT: 62.01 IN | SYSTOLIC BLOOD PRESSURE: 106 MMHG | DIASTOLIC BLOOD PRESSURE: 73 MMHG | HEART RATE: 88 BPM | WEIGHT: 213.19 LBS | BODY MASS INDEX: 38.74 KG/M2 | OXYGEN SATURATION: 99 %

## 2021-09-28 DIAGNOSIS — O99.019 ANTEPARTUM ANEMIA: Primary | ICD-10-CM

## 2021-09-28 PROCEDURE — 96372 THER/PROPH/DIAG INJ SC/IM: CPT

## 2021-09-28 PROCEDURE — 96365 THER/PROPH/DIAG IV INF INIT: CPT

## 2021-09-28 RX ORDER — CYANOCOBALAMIN 1000 UG/ML
1000 INJECTION INTRAMUSCULAR; SUBCUTANEOUS ONCE
Status: COMPLETED | OUTPATIENT
Start: 2021-09-28 | End: 2021-09-28

## 2021-09-28 RX ORDER — CYANOCOBALAMIN 1000 UG/ML
1000 INJECTION INTRAMUSCULAR; SUBCUTANEOUS ONCE
Status: CANCELLED | OUTPATIENT
Start: 2021-10-05

## 2021-09-28 RX ADMIN — CYANOCOBALAMIN 1000 MCG: 1000 INJECTION INTRAMUSCULAR; SUBCUTANEOUS at 13:28:00

## 2021-09-28 NOTE — PROGRESS NOTES
Education Record    Learner:  Patient    Disease / Diagnosis: Antepartum Anemia/OSIEL - venofer infusion and vitamin b12 injection    Barriers / Limitations:  None   Comments:    Method:  Brief focused and Reinforcement   Comments:    General Topics:  Plan o

## 2021-10-05 ENCOUNTER — OFFICE VISIT (OUTPATIENT)
Dept: HEMATOLOGY/ONCOLOGY | Facility: HOSPITAL | Age: 31
End: 2021-10-05
Attending: INTERNAL MEDICINE
Payer: COMMERCIAL

## 2021-10-05 VITALS
RESPIRATION RATE: 16 BRPM | BODY MASS INDEX: 38 KG/M2 | OXYGEN SATURATION: 100 % | SYSTOLIC BLOOD PRESSURE: 107 MMHG | TEMPERATURE: 98 F | HEART RATE: 91 BPM | DIASTOLIC BLOOD PRESSURE: 83 MMHG | WEIGHT: 210.38 LBS

## 2021-10-05 DIAGNOSIS — O99.019 ANTEPARTUM ANEMIA: Primary | ICD-10-CM

## 2021-10-05 PROCEDURE — 96365 THER/PROPH/DIAG IV INF INIT: CPT

## 2021-10-05 PROCEDURE — 96375 TX/PRO/DX INJ NEW DRUG ADDON: CPT

## 2021-10-05 PROCEDURE — 96372 THER/PROPH/DIAG INJ SC/IM: CPT

## 2021-10-05 PROCEDURE — S0028 INJECTION, FAMOTIDINE, 20 MG: HCPCS | Performed by: INTERNAL MEDICINE

## 2021-10-05 RX ORDER — CYANOCOBALAMIN 1000 UG/ML
1000 INJECTION INTRAMUSCULAR; SUBCUTANEOUS ONCE
Status: CANCELLED | OUTPATIENT
Start: 2021-10-12

## 2021-10-05 RX ORDER — FAMOTIDINE 10 MG/ML
20 INJECTION, SOLUTION INTRAVENOUS ONCE
Status: COMPLETED | OUTPATIENT
Start: 2021-10-05 | End: 2021-10-05

## 2021-10-05 RX ORDER — CYANOCOBALAMIN 1000 UG/ML
1000 INJECTION INTRAMUSCULAR; SUBCUTANEOUS ONCE
Status: COMPLETED | OUTPATIENT
Start: 2021-10-05 | End: 2021-10-05

## 2021-10-05 RX ORDER — FAMOTIDINE 10 MG/ML
20 INJECTION, SOLUTION INTRAVENOUS ONCE
Status: CANCELLED
Start: 2021-10-12 | End: 2021-10-12

## 2021-10-05 RX ADMIN — CYANOCOBALAMIN 1000 MCG: 1000 INJECTION INTRAMUSCULAR; SUBCUTANEOUS at 10:28:00

## 2021-10-05 RX ADMIN — FAMOTIDINE 20 MG: 10 INJECTION, SOLUTION INTRAVENOUS at 10:23:00

## 2021-10-05 NOTE — PROGRESS NOTES
Pt here for Venofer infusion and B12 injection.   Arrives Ambulating independently, accompanied by Self           Pregnancy screening: Reports possible pregnancy, pregnancy test ordered    Modifications in dose or schedule: No     Frequency of blood return

## 2021-10-06 ENCOUNTER — ROUTINE PRENATAL (OUTPATIENT)
Dept: OBGYN CLINIC | Facility: CLINIC | Age: 31
End: 2021-10-06
Payer: COMMERCIAL

## 2021-10-06 VITALS
BODY MASS INDEX: 39.23 KG/M2 | HEIGHT: 62 IN | WEIGHT: 213.19 LBS | SYSTOLIC BLOOD PRESSURE: 104 MMHG | DIASTOLIC BLOOD PRESSURE: 60 MMHG

## 2021-10-06 DIAGNOSIS — O99.210 OBESITY AFFECTING PREGNANCY, ANTEPARTUM: ICD-10-CM

## 2021-10-06 DIAGNOSIS — Z34.00 SUPERVISION OF NORMAL FIRST PREGNANCY, ANTEPARTUM: Primary | ICD-10-CM

## 2021-10-06 DIAGNOSIS — Z30.2 REQUEST FOR STERILIZATION: ICD-10-CM

## 2021-10-06 DIAGNOSIS — Z98.891 PREVIOUS CESAREAN SECTION: ICD-10-CM

## 2021-10-06 PROCEDURE — 3008F BODY MASS INDEX DOCD: CPT | Performed by: OBSTETRICS & GYNECOLOGY

## 2021-10-06 PROCEDURE — 3078F DIAST BP <80 MM HG: CPT | Performed by: OBSTETRICS & GYNECOLOGY

## 2021-10-06 PROCEDURE — 3074F SYST BP LT 130 MM HG: CPT | Performed by: OBSTETRICS & GYNECOLOGY

## 2021-10-06 PROCEDURE — 81002 URINALYSIS NONAUTO W/O SCOPE: CPT | Performed by: OBSTETRICS & GYNECOLOGY

## 2021-10-06 NOTE — PROGRESS NOTES
ECHO  [id number]  GA: 33w6d   10/06/21  1245   BP: 104/60   Weight: 213 lb 3.2 oz (96.7 kg)   Height: 62\"       Doing well, +FM  Denies LOF/VB/uctx  Rh positive, TDAP received  Fetal movement count given  Hx of PCS  and  , requested TITO w/ BS.  Robert

## 2021-10-12 ENCOUNTER — OFFICE VISIT (OUTPATIENT)
Dept: HEMATOLOGY/ONCOLOGY | Facility: HOSPITAL | Age: 31
End: 2021-10-12
Attending: INTERNAL MEDICINE
Payer: COMMERCIAL

## 2021-10-12 VITALS
RESPIRATION RATE: 18 BRPM | SYSTOLIC BLOOD PRESSURE: 99 MMHG | TEMPERATURE: 98 F | OXYGEN SATURATION: 100 % | HEART RATE: 84 BPM | DIASTOLIC BLOOD PRESSURE: 62 MMHG

## 2021-10-12 DIAGNOSIS — O99.019 ANTEPARTUM ANEMIA: Primary | ICD-10-CM

## 2021-10-12 PROCEDURE — 96375 TX/PRO/DX INJ NEW DRUG ADDON: CPT

## 2021-10-12 PROCEDURE — 96372 THER/PROPH/DIAG INJ SC/IM: CPT

## 2021-10-12 PROCEDURE — 96365 THER/PROPH/DIAG IV INF INIT: CPT

## 2021-10-12 PROCEDURE — S0028 INJECTION, FAMOTIDINE, 20 MG: HCPCS | Performed by: INTERNAL MEDICINE

## 2021-10-12 RX ORDER — FAMOTIDINE 10 MG/ML
20 INJECTION, SOLUTION INTRAVENOUS ONCE
Status: COMPLETED | OUTPATIENT
Start: 2021-10-12 | End: 2021-10-12

## 2021-10-12 RX ORDER — CYANOCOBALAMIN 1000 UG/ML
1000 INJECTION INTRAMUSCULAR; SUBCUTANEOUS ONCE
OUTPATIENT
Start: 2021-10-19

## 2021-10-12 RX ORDER — FAMOTIDINE 10 MG/ML
20 INJECTION, SOLUTION INTRAVENOUS ONCE
Start: 2021-10-19 | End: 2021-10-19

## 2021-10-12 RX ORDER — CYANOCOBALAMIN 1000 UG/ML
1000 INJECTION INTRAMUSCULAR; SUBCUTANEOUS ONCE
Status: COMPLETED | OUTPATIENT
Start: 2021-10-12 | End: 2021-10-12

## 2021-10-12 RX ADMIN — FAMOTIDINE 20 MG: 10 INJECTION, SOLUTION INTRAVENOUS at 10:16:00

## 2021-10-12 RX ADMIN — CYANOCOBALAMIN 1000 MCG: 1000 INJECTION INTRAMUSCULAR; SUBCUTANEOUS at 10:41:00

## 2021-10-12 NOTE — PROGRESS NOTES
Education Record    Learner:  Patient    Disease / Diagnosis: Antepartum Anemia    Barriers / Limitations:  None   Comments:    Method:  Brief focused   Comments:    General Topics:  Plan of care reviewed   Comments:    Outcome:  Shows understanding   Comm

## 2021-10-20 ENCOUNTER — APPOINTMENT (OUTPATIENT)
Dept: OBGYN CLINIC | Facility: CLINIC | Age: 31
End: 2021-10-20
Payer: COMMERCIAL

## 2021-10-20 ENCOUNTER — ROUTINE PRENATAL (OUTPATIENT)
Dept: OBGYN CLINIC | Facility: CLINIC | Age: 31
End: 2021-10-20
Payer: COMMERCIAL

## 2021-10-20 VITALS — WEIGHT: 212 LBS | DIASTOLIC BLOOD PRESSURE: 62 MMHG | BODY MASS INDEX: 39 KG/M2 | SYSTOLIC BLOOD PRESSURE: 108 MMHG

## 2021-10-20 DIAGNOSIS — R82.90 CLOUDY URINE: ICD-10-CM

## 2021-10-20 DIAGNOSIS — O99.210 OBESITY AFFECTING PREGNANCY, ANTEPARTUM: ICD-10-CM

## 2021-10-20 DIAGNOSIS — Z34.90 PRENATAL CARE, ANTEPARTUM: Primary | ICD-10-CM

## 2021-10-20 DIAGNOSIS — R39.89 ABNORMAL URINE COLOR: ICD-10-CM

## 2021-10-20 PROCEDURE — 87186 SC STD MICRODIL/AGAR DIL: CPT | Performed by: NURSE PRACTITIONER

## 2021-10-20 PROCEDURE — 81002 URINALYSIS NONAUTO W/O SCOPE: CPT | Performed by: NURSE PRACTITIONER

## 2021-10-20 PROCEDURE — 3078F DIAST BP <80 MM HG: CPT | Performed by: NURSE PRACTITIONER

## 2021-10-20 PROCEDURE — 87086 URINE CULTURE/COLONY COUNT: CPT | Performed by: NURSE PRACTITIONER

## 2021-10-20 PROCEDURE — 3074F SYST BP LT 130 MM HG: CPT | Performed by: NURSE PRACTITIONER

## 2021-10-20 PROCEDURE — 87088 URINE BACTERIA CULTURE: CPT | Performed by: NURSE PRACTITIONER

## 2021-10-20 PROCEDURE — 59025 FETAL NON-STRESS TEST: CPT | Performed by: NURSE PRACTITIONER

## 2021-10-20 NOTE — PROGRESS NOTES
ECHO  Doing well, +FM  Denies VB/LOF/uctx  Mode of delivery: RCS likely  Labor precautions discussed  Urine dark/ cloudy. It was positive for blood, ketones, and Nitrites. She denies any nausea, vomiting, fever, pain, urinary pain or changes.  Will run a maria de jesus

## 2021-10-27 ENCOUNTER — TELEPHONE (OUTPATIENT)
Dept: OBGYN CLINIC | Facility: CLINIC | Age: 31
End: 2021-10-27

## 2021-10-27 NOTE — TELEPHONE ENCOUNTER
G6/P 1131 GA 36 6/7 wks patient complaining of ctx since 8-9 o'clock last night. They are every 20-30 minutes and feel strong, but have not gotten stronger or closer together.    Last OV: 10/20/21 fabián with Joseph Nine   Pregnancy Complications: anemia, obesity, pre

## 2021-10-28 ENCOUNTER — APPOINTMENT (OUTPATIENT)
Dept: OBGYN CLINIC | Facility: CLINIC | Age: 31
End: 2021-10-28
Payer: COMMERCIAL

## 2021-10-28 ENCOUNTER — ROUTINE PRENATAL (OUTPATIENT)
Dept: OBGYN CLINIC | Facility: CLINIC | Age: 31
End: 2021-10-28
Payer: COMMERCIAL

## 2021-10-28 VITALS — BODY MASS INDEX: 38 KG/M2 | SYSTOLIC BLOOD PRESSURE: 120 MMHG | WEIGHT: 209 LBS | DIASTOLIC BLOOD PRESSURE: 68 MMHG

## 2021-10-28 DIAGNOSIS — Z34.83 ENCOUNTER FOR SUPERVISION OF OTHER NORMAL PREGNANCY IN THIRD TRIMESTER: ICD-10-CM

## 2021-10-28 DIAGNOSIS — Z3A.37 37 WEEKS GESTATION OF PREGNANCY: Primary | ICD-10-CM

## 2021-10-28 DIAGNOSIS — O99.210 OBESITY AFFECTING PREGNANCY, ANTEPARTUM: ICD-10-CM

## 2021-10-28 PROBLEM — Z34.90 SUPERVISION OF NORMAL PREGNANCY: Status: ACTIVE | Noted: 2021-10-28

## 2021-10-28 PROBLEM — Z34.90 SUPERVISION OF NORMAL PREGNANCY (HCC): Status: ACTIVE | Noted: 2021-10-28

## 2021-10-28 PROCEDURE — 3074F SYST BP LT 130 MM HG: CPT | Performed by: OBSTETRICS & GYNECOLOGY

## 2021-10-28 PROCEDURE — 87653 STREP B DNA AMP PROBE: CPT | Performed by: OBSTETRICS & GYNECOLOGY

## 2021-10-28 PROCEDURE — 81002 URINALYSIS NONAUTO W/O SCOPE: CPT | Performed by: OBSTETRICS & GYNECOLOGY

## 2021-10-28 PROCEDURE — 59025 FETAL NON-STRESS TEST: CPT | Performed by: OBSTETRICS & GYNECOLOGY

## 2021-10-28 PROCEDURE — 3078F DIAST BP <80 MM HG: CPT | Performed by: OBSTETRICS & GYNECOLOGY

## 2021-10-28 PROCEDURE — 87081 CULTURE SCREEN ONLY: CPT | Performed by: OBSTETRICS & GYNECOLOGY

## 2021-10-28 NOTE — PROGRESS NOTES
[id number] 37w0d seen for routine OB visit. Denies ctx, lof, vb. Reports good FM.     Patient Active Problem List:     Obesity affecting pregnancy, antepartum     Irregular menstrual cycle     Dysphagia     Previous  section     Esophageal obstructio

## 2021-10-28 NOTE — PATIENT INSTRUCTIONS
Labor and Childbirth: Your Body Prepares  Labor is the series of uterine contractions that dilate (open) and efface (thin) your cervix for birth.  Your due date is a guide to when labor will begin, but babies often come days or weeks before or after due d

## 2021-11-02 ENCOUNTER — ROUTINE PRENATAL (OUTPATIENT)
Dept: OBGYN CLINIC | Facility: CLINIC | Age: 31
End: 2021-11-02
Payer: COMMERCIAL

## 2021-11-02 ENCOUNTER — APPOINTMENT (OUTPATIENT)
Dept: OBGYN CLINIC | Facility: CLINIC | Age: 31
End: 2021-11-02
Payer: COMMERCIAL

## 2021-11-02 VITALS — BODY MASS INDEX: 38 KG/M2 | DIASTOLIC BLOOD PRESSURE: 64 MMHG | SYSTOLIC BLOOD PRESSURE: 108 MMHG | WEIGHT: 209 LBS

## 2021-11-02 DIAGNOSIS — O99.210 OBESITY AFFECTING PREGNANCY, ANTEPARTUM: ICD-10-CM

## 2021-11-02 DIAGNOSIS — Z34.90 PRENATAL CARE, ANTEPARTUM: Primary | ICD-10-CM

## 2021-11-02 PROCEDURE — 3074F SYST BP LT 130 MM HG: CPT | Performed by: OBSTETRICS & GYNECOLOGY

## 2021-11-02 PROCEDURE — 3078F DIAST BP <80 MM HG: CPT | Performed by: OBSTETRICS & GYNECOLOGY

## 2021-11-02 PROCEDURE — 59025 FETAL NON-STRESS TEST: CPT | Performed by: OBSTETRICS & GYNECOLOGY

## 2021-11-02 PROCEDURE — 81002 URINALYSIS NONAUTO W/O SCOPE: CPT | Performed by: OBSTETRICS & GYNECOLOGY

## 2021-11-08 ENCOUNTER — LAB ENCOUNTER (OUTPATIENT)
Dept: LAB | Age: 31
End: 2021-11-08
Attending: OBSTETRICS & GYNECOLOGY
Payer: COMMERCIAL

## 2021-11-08 DIAGNOSIS — Z20.822 ENCOUNTER FOR PREOPERATIVE SCREENING LABORATORY TESTING FOR COVID-19 VIRUS: ICD-10-CM

## 2021-11-08 DIAGNOSIS — Z01.812 ENCOUNTER FOR PREOPERATIVE SCREENING LABORATORY TESTING FOR COVID-19 VIRUS: ICD-10-CM

## 2021-11-09 ENCOUNTER — TELEPHONE (OUTPATIENT)
Dept: OBGYN UNIT | Facility: HOSPITAL | Age: 31
End: 2021-11-09

## 2021-11-11 ENCOUNTER — ANESTHESIA EVENT (OUTPATIENT)
Dept: OBGYN UNIT | Facility: HOSPITAL | Age: 31
End: 2021-11-11
Payer: COMMERCIAL

## 2021-11-11 ENCOUNTER — ANESTHESIA (OUTPATIENT)
Dept: OBGYN UNIT | Facility: HOSPITAL | Age: 31
End: 2021-11-11
Payer: COMMERCIAL

## 2021-11-11 ENCOUNTER — HOSPITAL ENCOUNTER (INPATIENT)
Facility: HOSPITAL | Age: 31
LOS: 4 days | Discharge: HOME | End: 2021-11-15
Attending: OBSTETRICS & GYNECOLOGY | Admitting: OBSTETRICS & GYNECOLOGY
Payer: COMMERCIAL

## 2021-11-11 PROBLEM — Z98.891 STATUS POST REPEAT LOW TRANSVERSE CESAREAN SECTION: Status: ACTIVE | Noted: 2021-11-11

## 2021-11-11 PROCEDURE — 0UB70ZZ EXCISION OF BILATERAL FALLOPIAN TUBES, OPEN APPROACH: ICD-10-PCS | Performed by: OBSTETRICS & GYNECOLOGY

## 2021-11-11 PROCEDURE — 58611 LIGATE OVIDUCT(S) ADD-ON: CPT | Performed by: OBSTETRICS & GYNECOLOGY

## 2021-11-11 PROCEDURE — 10907ZC DRAINAGE OF AMNIOTIC FLUID, THERAPEUTIC FROM PRODUCTS OF CONCEPTION, VIA NATURAL OR ARTIFICIAL OPENING: ICD-10-PCS | Performed by: OBSTETRICS & GYNECOLOGY

## 2021-11-11 PROCEDURE — 59510 CESAREAN DELIVERY: CPT | Performed by: OBSTETRICS & GYNECOLOGY

## 2021-11-11 PROCEDURE — 59514 CESAREAN DELIVERY ONLY: CPT | Performed by: OBSTETRICS & GYNECOLOGY

## 2021-11-11 RX ORDER — TRISODIUM CITRATE DIHYDRATE AND CITRIC ACID MONOHYDRATE 500; 334 MG/5ML; MG/5ML
30 SOLUTION ORAL ONCE
Status: COMPLETED | OUTPATIENT
Start: 2021-11-11 | End: 2021-11-11

## 2021-11-11 RX ORDER — SIMETHICONE 80 MG
80 TABLET,CHEWABLE ORAL 3 TIMES DAILY PRN
Status: DISCONTINUED | OUTPATIENT
Start: 2021-11-11 | End: 2021-11-15

## 2021-11-11 RX ORDER — DIPHENHYDRAMINE HCL 25 MG
25 CAPSULE ORAL EVERY 4 HOURS PRN
Status: DISCONTINUED | OUTPATIENT
Start: 2021-11-11 | End: 2021-11-15

## 2021-11-11 RX ORDER — SODIUM CHLORIDE, SODIUM LACTATE, POTASSIUM CHLORIDE, CALCIUM CHLORIDE 600; 310; 30; 20 MG/100ML; MG/100ML; MG/100ML; MG/100ML
125 INJECTION, SOLUTION INTRAVENOUS CONTINUOUS
Status: DISCONTINUED | OUTPATIENT
Start: 2021-11-11 | End: 2021-11-11 | Stop reason: HOSPADM

## 2021-11-11 RX ORDER — METOCLOPRAMIDE HYDROCHLORIDE 5 MG/ML
10 INJECTION INTRAMUSCULAR; INTRAVENOUS EVERY 6 HOURS PRN
Status: DISCONTINUED | OUTPATIENT
Start: 2021-11-11 | End: 2021-11-15

## 2021-11-11 RX ORDER — ONDANSETRON 2 MG/ML
INJECTION INTRAMUSCULAR; INTRAVENOUS AS NEEDED
Status: DISCONTINUED | OUTPATIENT
Start: 2021-11-11 | End: 2021-11-11 | Stop reason: SURG

## 2021-11-11 RX ORDER — HYDROMORPHONE HYDROCHLORIDE 1 MG/ML
0.3 INJECTION, SOLUTION INTRAMUSCULAR; INTRAVENOUS; SUBCUTANEOUS EVERY 2 HOUR PRN
Status: DISCONTINUED | OUTPATIENT
Start: 2021-11-11 | End: 2021-11-15

## 2021-11-11 RX ORDER — CEFAZOLIN SODIUM/WATER 2 G/20 ML
2 SYRINGE (ML) INTRAVENOUS ONCE
Status: COMPLETED | OUTPATIENT
Start: 2021-11-11 | End: 2021-11-11

## 2021-11-11 RX ORDER — BUPIVACAINE HYDROCHLORIDE 7.5 MG/ML
INJECTION, SOLUTION INTRASPINAL AS NEEDED
Status: DISCONTINUED | OUTPATIENT
Start: 2021-11-11 | End: 2021-11-11 | Stop reason: SURG

## 2021-11-11 RX ORDER — DEXAMETHASONE SODIUM PHOSPHATE 4 MG/ML
VIAL (ML) INJECTION AS NEEDED
Status: DISCONTINUED | OUTPATIENT
Start: 2021-11-11 | End: 2021-11-11 | Stop reason: SURG

## 2021-11-11 RX ORDER — ONDANSETRON 2 MG/ML
4 INJECTION INTRAMUSCULAR; INTRAVENOUS EVERY 6 HOURS PRN
Status: DISCONTINUED | OUTPATIENT
Start: 2021-11-11 | End: 2021-11-15

## 2021-11-11 RX ORDER — IBUPROFEN 600 MG/1
600 TABLET ORAL EVERY 6 HOURS
Status: DISCONTINUED | OUTPATIENT
Start: 2021-11-12 | End: 2021-11-15

## 2021-11-11 RX ORDER — NALBUPHINE HCL 10 MG/ML
2.5 AMPUL (ML) INJECTION
Status: DISCONTINUED | OUTPATIENT
Start: 2021-11-11 | End: 2021-11-11 | Stop reason: HOSPADM

## 2021-11-11 RX ORDER — MORPHINE SULFATE 0.5 MG/ML
0.2 INJECTION, SOLUTION EPIDURAL; INTRATHECAL; INTRAVENOUS ONCE
Status: DISCONTINUED | OUTPATIENT
Start: 2021-11-11 | End: 2021-11-11

## 2021-11-11 RX ORDER — KETOROLAC TROMETHAMINE 30 MG/ML
30 INJECTION, SOLUTION INTRAMUSCULAR; INTRAVENOUS EVERY 6 HOURS
Status: DISPENSED | OUTPATIENT
Start: 2021-11-11 | End: 2021-11-12

## 2021-11-11 RX ORDER — ENOXAPARIN SODIUM 100 MG/ML
40 INJECTION SUBCUTANEOUS NIGHTLY
Status: DISCONTINUED | OUTPATIENT
Start: 2021-11-11 | End: 2021-11-15

## 2021-11-11 RX ORDER — PHENYLEPHRINE HCL 10 MG/ML
VIAL (ML) INJECTION AS NEEDED
Status: DISCONTINUED | OUTPATIENT
Start: 2021-11-11 | End: 2021-11-11 | Stop reason: SURG

## 2021-11-11 RX ORDER — ONDANSETRON 2 MG/ML
4 INJECTION INTRAMUSCULAR; INTRAVENOUS ONCE AS NEEDED
Status: DISCONTINUED | OUTPATIENT
Start: 2021-11-11 | End: 2021-11-11 | Stop reason: HOSPADM

## 2021-11-11 RX ORDER — KETOROLAC TROMETHAMINE 30 MG/ML
INJECTION, SOLUTION INTRAMUSCULAR; INTRAVENOUS
Status: COMPLETED
Start: 2021-11-11 | End: 2021-11-11

## 2021-11-11 RX ORDER — NALBUPHINE HCL 10 MG/ML
2.5 AMPUL (ML) INJECTION EVERY 4 HOURS PRN
Status: DISCONTINUED | OUTPATIENT
Start: 2021-11-11 | End: 2021-11-15

## 2021-11-11 RX ORDER — SODIUM CHLORIDE, SODIUM LACTATE, POTASSIUM CHLORIDE, CALCIUM CHLORIDE 600; 310; 30; 20 MG/100ML; MG/100ML; MG/100ML; MG/100ML
INJECTION, SOLUTION INTRAVENOUS CONTINUOUS
Status: DISCONTINUED | OUTPATIENT
Start: 2021-11-11 | End: 2021-11-15

## 2021-11-11 RX ORDER — DIPHENHYDRAMINE HYDROCHLORIDE 50 MG/ML
25 INJECTION INTRAMUSCULAR; INTRAVENOUS ONCE AS NEEDED
Status: DISCONTINUED | OUTPATIENT
Start: 2021-11-11 | End: 2021-11-11 | Stop reason: HOSPADM

## 2021-11-11 RX ORDER — KETOROLAC TROMETHAMINE 30 MG/ML
30 INJECTION, SOLUTION INTRAMUSCULAR; INTRAVENOUS ONCE AS NEEDED
Status: DISCONTINUED | OUTPATIENT
Start: 2021-11-11 | End: 2021-11-11 | Stop reason: HOSPADM

## 2021-11-11 RX ORDER — MORPHINE SULFATE 2 MG/ML
INJECTION, SOLUTION INTRAMUSCULAR; INTRAVENOUS AS NEEDED
Status: DISCONTINUED | OUTPATIENT
Start: 2021-11-11 | End: 2021-11-11 | Stop reason: SURG

## 2021-11-11 RX ORDER — GABAPENTIN 300 MG/1
300 CAPSULE ORAL EVERY 8 HOURS PRN
Status: DISCONTINUED | OUTPATIENT
Start: 2021-11-11 | End: 2021-11-15

## 2021-11-11 RX ORDER — OXYCODONE HYDROCHLORIDE 5 MG/1
5 TABLET ORAL EVERY 6 HOURS PRN
Status: DISCONTINUED | OUTPATIENT
Start: 2021-11-11 | End: 2021-11-15

## 2021-11-11 RX ORDER — DEXTROSE, SODIUM CHLORIDE, SODIUM LACTATE, POTASSIUM CHLORIDE, AND CALCIUM CHLORIDE 5; .6; .31; .03; .02 G/100ML; G/100ML; G/100ML; G/100ML; G/100ML
INJECTION, SOLUTION INTRAVENOUS CONTINUOUS PRN
Status: DISCONTINUED | OUTPATIENT
Start: 2021-11-11 | End: 2021-11-15

## 2021-11-11 RX ORDER — BISACODYL 10 MG
10 SUPPOSITORY, RECTAL RECTAL ONCE AS NEEDED
Status: DISCONTINUED | OUTPATIENT
Start: 2021-11-11 | End: 2021-11-15

## 2021-11-11 RX ORDER — DOCUSATE SODIUM 100 MG/1
100 CAPSULE, LIQUID FILLED ORAL
Status: DISCONTINUED | OUTPATIENT
Start: 2021-11-11 | End: 2021-11-15

## 2021-11-11 RX ORDER — ACETAMINOPHEN 500 MG
1000 TABLET ORAL ONCE
Status: COMPLETED | OUTPATIENT
Start: 2021-11-11 | End: 2021-11-11

## 2021-11-11 RX ORDER — NALOXONE HYDROCHLORIDE 0.4 MG/ML
0.08 INJECTION, SOLUTION INTRAMUSCULAR; INTRAVENOUS; SUBCUTANEOUS
Status: ACTIVE | OUTPATIENT
Start: 2021-11-11 | End: 2021-11-12

## 2021-11-11 RX ORDER — ACETAMINOPHEN 500 MG
1000 TABLET ORAL EVERY 6 HOURS
Status: DISCONTINUED | OUTPATIENT
Start: 2021-11-11 | End: 2021-11-15

## 2021-11-11 RX ORDER — ONDANSETRON 2 MG/ML
4 INJECTION INTRAMUSCULAR; INTRAVENOUS EVERY 6 HOURS PRN
Status: DISCONTINUED | OUTPATIENT
Start: 2021-11-11 | End: 2021-11-11 | Stop reason: HOSPADM

## 2021-11-11 RX ORDER — DIPHENHYDRAMINE HYDROCHLORIDE 50 MG/ML
12.5 INJECTION INTRAMUSCULAR; INTRAVENOUS EVERY 4 HOURS PRN
Status: DISCONTINUED | OUTPATIENT
Start: 2021-11-11 | End: 2021-11-15

## 2021-11-11 RX ADMIN — PHENYLEPHRINE HCL 100 MCG: 10 MG/ML VIAL (ML) INJECTION at 07:41:00

## 2021-11-11 RX ADMIN — CEFAZOLIN SODIUM/WATER 2 G: 2 G/20 ML SYRINGE (ML) INTRAVENOUS at 07:43:00

## 2021-11-11 RX ADMIN — SODIUM CHLORIDE, SODIUM LACTATE, POTASSIUM CHLORIDE, CALCIUM CHLORIDE: 600; 310; 30; 20 INJECTION, SOLUTION INTRAVENOUS at 09:08:00

## 2021-11-11 RX ADMIN — SODIUM CHLORIDE, SODIUM LACTATE, POTASSIUM CHLORIDE, CALCIUM CHLORIDE: 600; 310; 30; 20 INJECTION, SOLUTION INTRAVENOUS at 07:28:00

## 2021-11-11 RX ADMIN — BUPIVACAINE HYDROCHLORIDE 1.5 ML: 7.5 INJECTION, SOLUTION INTRASPINAL at 07:36:00

## 2021-11-11 RX ADMIN — MORPHINE SULFATE 0.2 MG: 2 INJECTION, SOLUTION INTRAMUSCULAR; INTRAVENOUS at 07:36:00

## 2021-11-11 RX ADMIN — DEXAMETHASONE SODIUM PHOSPHATE 4 MG: 4 MG/ML VIAL (ML) INJECTION at 07:45:00

## 2021-11-11 RX ADMIN — ONDANSETRON 4 MG: 2 INJECTION INTRAMUSCULAR; INTRAVENOUS at 07:45:00

## 2021-11-11 NOTE — ANESTHESIA POSTPROCEDURE EVALUATION
[address] Patient Status:  Inpatient   Age/Gender 36year old female MRN [id number]   Location [address] [facility] Attending Ilda Navarro MD   Hosp Day # 0 PCP Americo Goldsmith       Anesthesia Post-op Note    GILBERTO

## 2021-11-11 NOTE — OPERATIVE REPORT
[facility]   Section - Operative Note    Bhupendra Chava Patient Status:  Inpatient    1990 MRN [id number]   Location 1818 [facility] Attending Anushka Molina MD   [facility] Day # 0 PCP Drea Peck D Nuchal  # of loops: 1  Timed cord clamping: Yes  Time in sec: 30  Cord blood disposition: to lab  Gases sent?: No     Resuscitation    Method: Suctioning, Oxygen     [place] Measurements    Weight: 3450 g 7 lb 9.7 oz Length: 48.3 cm   Head circum.: 35.5 cm The bladder retractor was placed was placed and the bladder flap was developed. A low transverse incision was created using the scalpel and extended laterally bluntly. Amniotomy was performed. The amniotic fluid was clear.  The infants head was then  br

## 2021-11-11 NOTE — ADDENDUM NOTE
Addendum  created 11/11/21 0950 by Jesus Rodriguez MD    Clinical Note Signed, Intraprocedure Blocks edited

## 2021-11-11 NOTE — PROGRESS NOTES
NURSING ADMISSION NOTE      Patient admitted via Cart  Oriented to room. Safety precautions initiated. Bed in low position. Call light in reach. Report received from Eric Álvarez. ID bands checked by 2 RN's.  POC reviewed w/pt & SO

## 2021-11-11 NOTE — PLAN OF CARE
Problem: GASTROINTESTINAL - ADULT  Goal: Minimal or absence of nausea and vomiting  Description: INTERVENTIONS:  - Maintain adequate hydration with IV or PO as ordered and tolerated  - Nasogastric tube to low intermittent suction as ordered  - Evaluate e and monitor for bladder distention.  - Provide/instruct/assist with pericare as needed. - Provide VTE prophylaxis as needed. - Monitor bowel function.  - Encourage ambulation and provide assistance as needed.   - Assess and monitor emotional status and pr engorgement. - Provide breast feeding education handouts and information on community breast feeding support. Outcome: Progressing  Goal: Experiences normal breast weaning course  Description: INTERVENTIONS:  - Assess for and manage engorgement.   - Inst

## 2021-11-11 NOTE — CM/SW NOTE
SW met with pt to provide support and encouragement. Pt introduced baby girl, Paula, to this SW.  Pt was seen holding baby girl showing love and affection. Social work to remain available for support or any discharge planning needs.     Dee Lewis

## 2021-11-11 NOTE — ANESTHESIA PROCEDURE NOTES
Spinal Block  Performed by: Yessenia Alejo MD  Authorized by: Yessenia Alejo MD       General Information and Staff    Start Time:  11/11/2021 7:28 AM  End Time:  11/11/2021 7:36 AM  Anesthesiologist:  Yessenia Alejo MD  Performed by:   Anesthes

## 2021-11-11 NOTE — H&P
[address] [facility]  Obstetrics and Gynecology  History & Physical    Cordella [facility] Patient Status:  Inpatient    1990 MRN [id number]   Location [address] [facility] Attending Carter Chandra MD   Hospital Day 0 PCP Fely Garber Extrinsic asthma, unspecified    • Miscarriage      Past Social History:   Past Surgical History:   Procedure Laterality Date   • ABDOMEN SURGERY PROC UNLISTED  10/21/2020    ABDOMINAL WOUND EXPLORATION   • APPENDECTOMY  2019   •      • CHOLECY minutes    SVE: deferred    [place]:  cephalic    Prenatal Labs Brief Review   Blood Type:   Lab Results   Component Value Date    ABO A 11/11/2021    RH Positive 11/11/2021     GBS:  Negative      Inpatient labs:  Lab Results   Component Value Date    WB

## 2021-11-11 NOTE — CM/SW NOTE
SW met with pt in recovery to provide support due to delivery of a baby girl. Pt is known to this SW due to loss of pt's daughter, Lelia Mitchell in June 2019. Pt states that she is doing well post delivery.     SW will meet with pt further to provide ongoing suppo

## 2021-11-11 NOTE — ANESTHESIA PREPROCEDURE EVALUATION
PRE-OP EVALUATION    Patient Name: Haley Burton    Admit Diagnosis: preg state  Pregnancy    Pre-op Diagnosis: 39.0 IUP Repeat  w/ PPTL     SECTION with postpartum tubal ligation     Anesthesia Procedure:  SECTION with postpartum Complications           GI/Hepatic/Renal    Negative GI/hepatic/renal ROS. Cardiovascular    Negative cardiovascular ROS.            (+) obesity                                       Endo/Other    Negative endo/other ROS. of HA, infection, bleeding, failed block explained. All questions answered.     Plan/risks discussed with: patient and spouse                Present on Admission:  **None**

## 2021-11-12 NOTE — PROGRESS NOTES
[address] 2SW-J robert Leal Patient Status:  Inpatient   Age/Gender 36year old female MRN [id number]   [facility] 2SW-J Attending Oscar Parada MD   [facility] Day # 1 PCP Rocío Kenney      Anesthesia Pain Progress Not

## 2021-11-12 NOTE — PROGRESS NOTES
[facility]  Post-Partum Caesarean Section Progress Note    Yina Barnes Patient Status:  Inpatient    1990 MRN [id number]   [facility] 2SW-J Attending Sharon Kenney MD   [facility] Day # 1 PCP Kareem Mcelroy     SUBJECTIVE:    Po immune  - regular diet, ambulate  - breastfeeding: doing well, lactation nursing available as needed    Dispo:  Anticipate d/c home POD#2-3 if remains clinically stable    Candida Hurtado MD  [facility]  11/12/2021 9:41 AM

## 2021-11-12 NOTE — PLAN OF CARE
Problem: GASTROINTESTINAL - ADULT  Goal: Minimal or absence of nausea and vomiting  Description: INTERVENTIONS:  - Maintain adequate hydration with IV or PO as ordered and tolerated  - Nasogastric tube to low intermittent suction as ordered  - Evaluate e and monitor for bladder distention.  - Provide/instruct/assist with pericare as needed. - Provide VTE prophylaxis as needed. - Monitor bowel function.  - Encourage ambulation and provide assistance as needed.   - Assess and monitor emotional status and pr engorgement. - Provide breast feeding education handouts and information on community breast feeding support. Outcome: Progressing  Goal: Appropriate maternal -  bonding  Description: INTERVENTIONS:  - Assess caregiver- interactions.   - As

## 2021-11-12 NOTE — PROGRESS NOTES
Pt continues to have intermittent vttomiting with green/brown emesis noted. Attempting to drink po fluids but vomiting persists. Pt has received Zofran x1 &   Reglan x2 since arriving on unit.

## 2021-11-12 NOTE — CM/SW NOTE
SW met with pt to provide support and encouragement. Pt was observed holding baby girl, Odalys Guzman, Pt states that she is going well and feeling positive. Pt scored a 4 on the EPDS.     Social work to remain available for support or any discharge planning

## 2021-11-13 RX ORDER — IBUPROFEN 600 MG/1
600 TABLET ORAL EVERY 6 HOURS PRN
Qty: 30 TABLET | Refills: 0 | Status: SHIPPED | OUTPATIENT
Start: 2021-11-13

## 2021-11-13 RX ORDER — OXYCODONE HYDROCHLORIDE 5 MG/1
5 CAPSULE ORAL EVERY 4 HOURS PRN
Qty: 20 CAPSULE | Refills: 0 | Status: SHIPPED | OUTPATIENT
Start: 2021-11-13 | End: 2021-12-02

## 2021-11-13 NOTE — PLAN OF CARE
Problem: GASTROINTESTINAL - ADULT  Goal: Maintains or returns to baseline bowel function  Description: INTERVENTIONS:  - Assess bowel function  - Maintain adequate hydration with IV or PO as ordered and tolerated  - Evaluate effectiveness of GI medicatio tubes/drains.  - Obtain immunization and exposure to communicable diseases history. Outcome: Progressing  Goal: Optimize infant feeding at the breast  Description: INTERVENTIONS:  - Initiate breast feeding within first hour after birth.    - Monitor effect services/case management support as needed.   Outcome: Progressing

## 2021-11-13 NOTE — PROGRESS NOTES
SECTION POSTOPERATIVE DAY 2    Subjective:   Patient without complaints. Ambulating without difficulty. Pain well controlled. Tolerating general diet.     Objective:   Patient Vitals for the past 24 hrs:   BP Temp Temp src Pulse Resp

## 2021-11-14 NOTE — PLAN OF CARE
Problem: GASTROINTESTINAL - ADULT  Goal: Maintains or returns to baseline bowel function  Description: INTERVENTIONS:  - Assess bowel function  - Maintain adequate hydration with IV or PO as ordered and tolerated  - Evaluate effectiveness of GI medicatio tubes/drains.  - Obtain immunization and exposure to communicable diseases history. Outcome: Progressing  Goal: Appropriate maternal -  bonding  Description: INTERVENTIONS:  - Assess caregiver- interactions.   - Assess caregiver's emotional s

## 2021-11-14 NOTE — PROGRESS NOTES
SECTION POSTOPERATIVE DAY 3    Subjective:   Patient without complaints. Ambulating without difficulty. Pain well controlled. Tolerating general diet.     Objective:   Patient Vitals for the past 24 hrs:   BP Temp Temp src Pulse Resp

## 2021-11-15 VITALS
RESPIRATION RATE: 16 BRPM | WEIGHT: 209 LBS | TEMPERATURE: 98 F | BODY MASS INDEX: 38.46 KG/M2 | SYSTOLIC BLOOD PRESSURE: 112 MMHG | HEIGHT: 62 IN | DIASTOLIC BLOOD PRESSURE: 75 MMHG | HEART RATE: 69 BPM | OXYGEN SATURATION: 97 %

## 2021-11-15 RX ORDER — GABAPENTIN 300 MG/1
300 CAPSULE ORAL EVERY 8 HOURS PRN
Qty: 20 CAPSULE | Refills: 0 | Status: SHIPPED | OUTPATIENT
Start: 2021-11-15 | End: 2021-12-02

## 2021-11-15 NOTE — PLAN OF CARE
Problem: GASTROINTESTINAL - ADULT  Goal: Maintains or returns to baseline bowel function  Description: INTERVENTIONS:  - Assess bowel function  - Maintain adequate hydration with IV or PO as ordered and tolerated  - Evaluate effectiveness of GI medicatio tubes/drains.  - Obtain immunization and exposure to communicable diseases history. Outcome: Completed  Goal: Optimize infant feeding at the breast  Description: INTERVENTIONS:  - Initiate breast feeding within first hour after birth.    - Monitor effectiv management support as needed.   Outcome: Completed     Problem: PAIN - ADULT  Goal: Verbalizes/displays adequate comfort level or patient's stated pain goal  Description: INTERVENTIONS:  - Encourage pt to monitor pain and request assistance  - Assess pain u

## 2021-11-15 NOTE — PROGRESS NOTES
SECTION POSTOPERATIVE DAY 4    Subjective:   Patient without complaints. Ambulating without difficulty. Pain well controlled. Tolerating general diet.     Objective:   Patient Vitals for the past 24 hrs:   BP Temp Temp src Pulse Resp   11/15/2

## 2021-11-15 NOTE — DISCHARGE SUMMARY
SECTION DISCHARGE SUMMARY     Admit date: 2021    Discharge date: 11/15/2021     Attending Physician: Jaci Cedillo MD     Discharge Diagnoses: Term pregnancy, previous  section, desires sterilization    Procedures: Repeat l

## 2021-11-20 ENCOUNTER — TELEPHONE (OUTPATIENT)
Dept: OBGYN UNIT | Facility: HOSPITAL | Age: 31
End: 2021-11-20

## 2021-11-30 NOTE — TELEPHONE ENCOUNTER
Last OV: 11/2/21 fabián with Dr. Alfonso Taveras  Pt is s/p repeat LTCS on 11/11/21  Last refill date: 5/12/21  Follow-up: 2 weeks post partum for incision check  Next appt.: 12/2/21    Refill sent

## 2021-12-02 ENCOUNTER — POSTPARTUM (OUTPATIENT)
Dept: OBGYN CLINIC | Facility: CLINIC | Age: 31
End: 2021-12-02
Payer: COMMERCIAL

## 2021-12-02 VITALS
BODY MASS INDEX: 37.21 KG/M2 | SYSTOLIC BLOOD PRESSURE: 102 MMHG | DIASTOLIC BLOOD PRESSURE: 58 MMHG | WEIGHT: 202.19 LBS | HEIGHT: 62 IN

## 2021-12-02 DIAGNOSIS — Z98.891 STATUS POST REPEAT LOW TRANSVERSE CESAREAN SECTION: ICD-10-CM

## 2021-12-02 PROCEDURE — 3078F DIAST BP <80 MM HG: CPT | Performed by: OBSTETRICS & GYNECOLOGY

## 2021-12-02 PROCEDURE — 3008F BODY MASS INDEX DOCD: CPT | Performed by: OBSTETRICS & GYNECOLOGY

## 2021-12-02 PROCEDURE — 3074F SYST BP LT 130 MM HG: CPT | Performed by: OBSTETRICS & GYNECOLOGY

## 2021-12-02 RX ORDER — LIDOCAINE 3% TOPICAL ANESTHETIC CREAM 0.03 G/G
1 CREAM TOPICAL 3 TIMES DAILY PRN
Qty: 28 G | Refills: 0 | Status: SHIPPED | OUTPATIENT
Start: 2021-12-02

## 2021-12-02 NOTE — PROGRESS NOTES
POSTPARTUM VISIT    S: patient is a 36year old [id number] who presents today for post partum visit. She underwent repeat LTCS and BS on 11/11/21.      Patient Active Problem List:     Obesity affecting pregnancy, antepartum     Irregular menstrual cycle     D lidocaine  -Contraception: s/p bilateral salpingectomy  -Pap smear: ASCUS pap 5/2021, nl colpo. Repeat pap co-testing after 12 months.  -Co-morbidities:   - Body mass index is 36.98 kg/m².    - possible transient hyperthyroid - re-test TFTs pp    Follow up

## 2021-12-21 ENCOUNTER — POSTPARTUM (OUTPATIENT)
Dept: OBGYN CLINIC | Facility: CLINIC | Age: 31
End: 2021-12-21
Payer: COMMERCIAL

## 2021-12-21 VITALS
WEIGHT: 214.63 LBS | HEART RATE: 87 BPM | BODY MASS INDEX: 39.49 KG/M2 | SYSTOLIC BLOOD PRESSURE: 114 MMHG | HEIGHT: 62 IN | DIASTOLIC BLOOD PRESSURE: 78 MMHG

## 2021-12-21 DIAGNOSIS — Z12.4 SCREENING FOR CERVICAL CANCER: ICD-10-CM

## 2021-12-21 DIAGNOSIS — Z98.891 STATUS POST REPEAT LOW TRANSVERSE CESAREAN SECTION: ICD-10-CM

## 2021-12-21 DIAGNOSIS — R79.89 ABNORMAL TSH: ICD-10-CM

## 2021-12-21 PROBLEM — Z34.90 SUPERVISION OF NORMAL PREGNANCY: Status: RESOLVED | Noted: 2021-10-28 | Resolved: 2021-12-21

## 2021-12-21 PROBLEM — Z63.4 GRIEF AT LOSS OF CHILD: Status: ACTIVE | Noted: 2021-12-21

## 2021-12-21 PROBLEM — F43.21 GRIEF AT LOSS OF CHILD: Status: ACTIVE | Noted: 2021-12-21

## 2021-12-21 PROBLEM — Z86.59 HISTORY OF DEPRESSION: Status: ACTIVE | Noted: 2021-05-11

## 2021-12-21 PROBLEM — Z84.89: Status: RESOLVED | Noted: 2021-08-10 | Resolved: 2021-12-21

## 2021-12-21 PROBLEM — Z34.90 SUPERVISION OF NORMAL PREGNANCY (HCC): Status: RESOLVED | Noted: 2021-10-28 | Resolved: 2021-12-21

## 2021-12-21 PROBLEM — Z34.90 PREGNANCY (HCC): Status: RESOLVED | Noted: 2021-04-28 | Resolved: 2021-12-21

## 2021-12-21 PROBLEM — Z90.79 HISTORY OF BILATERAL SALPINGECTOMY: Status: ACTIVE | Noted: 2021-06-18

## 2021-12-21 PROBLEM — Z34.90 PREGNANCY: Status: RESOLVED | Noted: 2021-04-28 | Resolved: 2021-12-21

## 2021-12-21 PROCEDURE — 3008F BODY MASS INDEX DOCD: CPT | Performed by: OBSTETRICS & GYNECOLOGY

## 2021-12-21 PROCEDURE — 88175 CYTOPATH C/V AUTO FLUID REDO: CPT | Performed by: OBSTETRICS & GYNECOLOGY

## 2021-12-21 PROCEDURE — 3078F DIAST BP <80 MM HG: CPT | Performed by: OBSTETRICS & GYNECOLOGY

## 2021-12-21 PROCEDURE — 3074F SYST BP LT 130 MM HG: CPT | Performed by: OBSTETRICS & GYNECOLOGY

## 2021-12-21 NOTE — PATIENT INSTRUCTIONS
After Giving Birth: How to Feel Healthy    Helping yourself feel fit is one of the best things you can do for your baby. A little exercise will tone your muscles. You’ll feel stronger and more energized. You’ll also feel more awake and aware.  Don’t worry more like your prepregnant self. Do your hair and wear makeup, if you normally do. A loose-fitting dress may feel good. But do yourself a favor: Don’t reach for your jeans. It’s likely to be a month or more before you can wear them.  If leaking breasts are

## 2021-12-21 NOTE — PROGRESS NOTES
POSTPARTUM VISIT    S: patient is a 36year old [id number] who presents today for post partum visit. She underwent repeat LTCS with BS on 11/11/21.      Patient Active Problem List:     Obesity affecting pregnancy, antepartum     Irregular menstrual cycle non tender, no masses, normal size              A/P:  -Postpartum depression screen reassuring  -Contraception: s/p BS  -Pap smear: pap with reflex collected due to ASCUS with +HPV in May, counseled that she may need another colposcopy  -Co-morbidities:

## 2021-12-30 PROBLEM — R87.612 LGSIL ON PAP SMEAR OF CERVIX: Status: ACTIVE | Noted: 2021-06-18

## 2021-12-30 NOTE — PROGRESS NOTES
Patient notified and verbalizes understanding  She will call back to schedule  Routed to the abnormal pap pool.

## 2022-03-17 NOTE — TELEPHONE ENCOUNTER
Per Dr. Brandi Ruby      Refill approved so she does not lapse in treatment, but this needs to be transferred to PCP for continued management.     Detailed message left notifying patient to contact PCP to start managing medication

## 2022-03-17 NOTE — TELEPHONE ENCOUNTER
Last OV: 12/2021  Last refill date: 11/2021  Follow-up: for annual   Next appt.: non schedule    Dr. Kesha Short, please advise of okay for refill and if you want patient to contact PCP to continue management.

## 2022-06-10 ENCOUNTER — HOSPITAL ENCOUNTER (EMERGENCY)
Age: 32
Discharge: HOME | End: 2022-06-10
Attending: EMERGENCY MEDICINE
Payer: OTHER MISCELLANEOUS

## 2022-06-10 VITALS
DIASTOLIC BLOOD PRESSURE: 76 MMHG | TEMPERATURE: 99 F | HEIGHT: 62 IN | OXYGEN SATURATION: 100 % | HEART RATE: 89 BPM | WEIGHT: 210 LBS | RESPIRATION RATE: 18 BRPM | SYSTOLIC BLOOD PRESSURE: 130 MMHG | BODY MASS INDEX: 38.64 KG/M2
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
## 2022-06-10 DIAGNOSIS — S61.452A DOG BITE, HAND, LEFT, INITIAL ENCOUNTER: ICD-10-CM

## 2022-06-10 DIAGNOSIS — S61.552A DOG BITE OF LEFT WRIST, INITIAL ENCOUNTER: Primary | ICD-10-CM

## 2022-06-10 PROCEDURE — 12001 RPR S/N/AX/GEN/TRNK 2.5CM/<: CPT

## 2022-06-10 PROCEDURE — 12001 RPR S/N/AX/GEN/TRNK 2.5CM/<: CPT | Performed by: PHYSICIAN ASSISTANT

## 2022-06-10 PROCEDURE — 99283 EMERGENCY DEPT VISIT LOW MDM: CPT

## 2022-06-10 RX ORDER — IBUPROFEN 600 MG/1
600 TABLET ORAL ONCE
Status: COMPLETED | OUTPATIENT
Start: 2022-06-10 | End: 2022-06-10

## 2022-06-10 RX ORDER — AMOXICILLIN AND CLAVULANATE POTASSIUM 875; 125 MG/1; MG/1
1 TABLET, FILM COATED ORAL 2 TIMES DAILY
Qty: 10 TABLET | Refills: 0 | Status: SHIPPED | OUTPATIENT
Start: 2022-06-10 | End: 2022-06-15

## 2022-06-10 RX ORDER — AMOXICILLIN AND CLAVULANATE POTASSIUM 875; 125 MG/1; MG/1
875 TABLET, FILM COATED ORAL ONCE
Status: COMPLETED | OUTPATIENT
Start: 2022-06-10 | End: 2022-06-10

## 2022-06-10 NOTE — ED QUICK NOTES
Pt states she works as a  and was bit by an [address] dog on left hand. Pt has 2 puncture wounds to left hand and wrist, 3rd wound on top of left wrist is open and more of a laceration.  Pt states dog is UTD on vaccines

## 2022-06-10 NOTE — ED INITIAL ASSESSMENT (HPI)
States at work was bit by dog. 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 Bandage in place no bleeding noted.

## 2022-06-15 ENCOUNTER — APPOINTMENT (OUTPATIENT)
Dept: OTHER | Facility: HOSPITAL | Age: 32
End: 2022-06-15
Attending: PREVENTIVE MEDICINE

## 2022-06-20 ENCOUNTER — APPOINTMENT (OUTPATIENT)
Dept: OTHER | Facility: HOSPITAL | Age: 32
End: 2022-06-20
Attending: PREVENTIVE MEDICINE

## 2022-06-27 ENCOUNTER — APPOINTMENT (OUTPATIENT)
Dept: OTHER | Facility: HOSPITAL | Age: 32
End: 2022-06-27
Attending: PREVENTIVE MEDICINE

## 2022-10-20 NOTE — PROGRESS NOTES
[facility]    Patients Name: Lb Nelson  Attending Physician: Jon Almaraz MD  CSN: [id number]    Location:  118/118-A  MRN: [id number]    YOB: 1990  Admission Date: 3/26/2019     Obstetric Anesthesia Pain Progress Note    Post-Op D IOP stable OU . Good Response to OD SLT. ONH photos stable OS , poor fixation OD .

## 2022-12-12 ENCOUNTER — HOSPITAL ENCOUNTER (EMERGENCY)
Age: 32
Discharge: HOME | End: 2022-12-12
Attending: EMERGENCY MEDICINE
Payer: MEDICAID

## 2022-12-12 VITALS
TEMPERATURE: 97 F | HEART RATE: 100 BPM | DIASTOLIC BLOOD PRESSURE: 57 MMHG | WEIGHT: 210 LBS | SYSTOLIC BLOOD PRESSURE: 107 MMHG | BODY MASS INDEX: 38.64 KG/M2 | RESPIRATION RATE: 18 BRPM | HEIGHT: 62 IN | OXYGEN SATURATION: 98 %

## 2022-12-12 DIAGNOSIS — A08.4 VIRAL GASTROENTERITIS: Primary | ICD-10-CM

## 2022-12-12 LAB
ALBUMIN SERPL-MCNC: 4.2 G/DL (ref 3.4–5)
ALBUMIN/GLOB SERPL: 1.1 {RATIO} (ref 1–2)
ALP LIVER SERPL-CCNC: 64 U/L
ALT SERPL-CCNC: 24 U/L
ANION GAP SERPL CALC-SCNC: 6 MMOL/L (ref 0–18)
AST SERPL-CCNC: 22 U/L (ref 15–37)
B-HCG UR QL: NEGATIVE
BASOPHILS # BLD AUTO: 0.01 X10(3) UL (ref 0–0.2)
BASOPHILS NFR BLD AUTO: 0.1 %
BILIRUB SERPL-MCNC: 0.6 MG/DL (ref 0.1–2)
BILIRUB UR QL STRIP.AUTO: NEGATIVE
BUN BLD-MCNC: 19 MG/DL (ref 7–18)
CALCIUM BLD-MCNC: 9.2 MG/DL (ref 8.5–10.1)
CHLORIDE SERPL-SCNC: 108 MMOL/L (ref 98–112)
CLARITY UR REFRACT.AUTO: CLEAR
CO2 SERPL-SCNC: 25 MMOL/L (ref 21–32)
COLOR UR AUTO: YELLOW
CREAT BLD-MCNC: 0.97 MG/DL
EOSINOPHIL # BLD AUTO: 0.07 X10(3) UL (ref 0–0.7)
EOSINOPHIL NFR BLD AUTO: 0.6 %
ERYTHROCYTE [DISTWIDTH] IN BLOOD BY AUTOMATED COUNT: 14.3 %
GFR SERPLBLD BASED ON 1.73 SQ M-ARVRAT: 81 ML/MIN/1.73M2 (ref 60–?)
GLOBULIN PLAS-MCNC: 3.9 G/DL (ref 2.8–4.4)
GLUCOSE BLD-MCNC: 121 MG/DL (ref 70–99)
GLUCOSE UR STRIP.AUTO-MCNC: NEGATIVE MG/DL
HCT VFR BLD AUTO: 40 %
HGB BLD-MCNC: 13.8 G/DL
IMM GRANULOCYTES # BLD AUTO: 0.04 X10(3) UL (ref 0–1)
IMM GRANULOCYTES NFR BLD: 0.3 %
KETONES UR STRIP.AUTO-MCNC: NEGATIVE MG/DL
LYMPHOCYTES # BLD AUTO: 0.63 X10(3) UL (ref 1–4)
LYMPHOCYTES NFR BLD AUTO: 5.4 %
MCH RBC QN AUTO: 29.9 PG (ref 26–34)
MCHC RBC AUTO-ENTMCNC: 34.5 G/DL (ref 31–37)
MCV RBC AUTO: 86.6 FL
MONOCYTES # BLD AUTO: 0.37 X10(3) UL (ref 0.1–1)
MONOCYTES NFR BLD AUTO: 3.2 %
NEUTROPHILS # BLD AUTO: 10.47 X10 (3) UL (ref 1.5–7.7)
NEUTROPHILS # BLD AUTO: 10.47 X10(3) UL (ref 1.5–7.7)
NEUTROPHILS NFR BLD AUTO: 90.4 %
NITRITE UR QL STRIP.AUTO: NEGATIVE
OSMOLALITY SERPL CALC.SUM OF ELEC: 292 MOSM/KG (ref 275–295)
PH UR STRIP.AUTO: 6 [PH] (ref 5–8)
PLATELET # BLD AUTO: 345 10(3)UL (ref 150–450)
POTASSIUM SERPL-SCNC: 3.9 MMOL/L (ref 3.5–5.1)
PROT SERPL-MCNC: 8.1 G/DL (ref 6.4–8.2)
PROT UR STRIP.AUTO-MCNC: NEGATIVE MG/DL
RBC # BLD AUTO: 4.62 X10(6)UL
SODIUM SERPL-SCNC: 139 MMOL/L (ref 136–145)
SP GR UR STRIP.AUTO: 1.02 (ref 1–1.03)
UROBILINOGEN UR STRIP.AUTO-MCNC: 0.2 MG/DL
WBC # BLD AUTO: 11.6 X10(3) UL (ref 4–11)

## 2022-12-12 PROCEDURE — 81015 MICROSCOPIC EXAM OF URINE: CPT | Performed by: EMERGENCY MEDICINE

## 2022-12-12 PROCEDURE — 96361 HYDRATE IV INFUSION ADD-ON: CPT

## 2022-12-12 PROCEDURE — 99284 EMERGENCY DEPT VISIT MOD MDM: CPT

## 2022-12-12 PROCEDURE — 80053 COMPREHEN METABOLIC PANEL: CPT | Performed by: EMERGENCY MEDICINE

## 2022-12-12 PROCEDURE — 81001 URINALYSIS AUTO W/SCOPE: CPT | Performed by: EMERGENCY MEDICINE

## 2022-12-12 PROCEDURE — 96374 THER/PROPH/DIAG INJ IV PUSH: CPT

## 2022-12-12 PROCEDURE — 87086 URINE CULTURE/COLONY COUNT: CPT | Performed by: EMERGENCY MEDICINE

## 2022-12-12 PROCEDURE — 81025 URINE PREGNANCY TEST: CPT

## 2022-12-12 PROCEDURE — 85025 COMPLETE CBC W/AUTO DIFF WBC: CPT | Performed by: EMERGENCY MEDICINE

## 2022-12-12 RX ORDER — ONDANSETRON 2 MG/ML
4 INJECTION INTRAMUSCULAR; INTRAVENOUS ONCE
Status: COMPLETED | OUTPATIENT
Start: 2022-12-12 | End: 2022-12-12

## 2022-12-12 RX ORDER — ONDANSETRON 4 MG/1
4 TABLET, ORALLY DISINTEGRATING ORAL EVERY 6 HOURS PRN
Qty: 15 TABLET | Refills: 0 | Status: SHIPPED | OUTPATIENT
Start: 2022-12-12 | End: 2022-12-19

## 2023-02-28 NOTE — PLAN OF CARE
Mom, Kimberly, suspects that pt and silbling, Quinn, have hand, foot, and mouth. She would like them to be seen. Please advise.         [phone]   Problem: GASTROINTESTINAL - ADULT  Goal: Maintains or returns to baseline bowel function  Description: INTERVENTIONS:  - Assess bowel function  - Maintain adequate hydration with IV or PO as ordered and tolerated  - Evaluate effectiveness of GI medicatio tubes/drains.  - Obtain immunization and exposure to communicable diseases history. Outcome: Progressing  Goal: Optimize infant feeding at the breast  Description: INTERVENTIONS:  - Initiate breast feeding within first hour after birth.    - Monitor effect services/case management support as needed.   Outcome: Progressing     Problem: PAIN - ADULT  Goal: Verbalizes/displays adequate comfort level or patient's stated pain goal  Description: INTERVENTIONS:  - Encourage pt to monitor pain and request assistance

## 2023-06-07 NOTE — ED NOTES
Pt here for sudden onset of R flank pain, with mild MAXI. Pt 32 weeks AOG with np vaginal bleeding @ this time.  Dr Jovanny Hendricks aware, OB, L&D made aware pt need fetal monitoring Melolabial Transposition Flap Text: The defect edges were debeveled with a #15 scalpel blade.  Given the location of the defect and the proximity to free margins a melolabial flap was deemed most appropriate.  Using a sterile surgical marker, an appropriate melolabial transposition flap was drawn incorporating the defect.    The area thus outlined was incised deep to adipose tissue with a #15 scalpel blade.  The skin margins were undermined to an appropriate distance in all directions utilizing iris scissors.

## 2023-06-15 NOTE — ANESTHESIA PREPROCEDURE EVALUATION
PRE-OP EVALUATION    Patient Name: Grace Lyles    Pre-op Diagnosis: Cholecystitis [K81.9]    Procedure(s):  LAPAROSCOPIC CHOLECYSTECTOMY WITH INTRAOPERATIVE CHOLANGIOGRAM    Surgeon(s) and Role:     Kamar Laguna, DO - Primary    Pre-op vitals reviewe Script completed.      Taisha Smith MD, PhD     0.5 mL, Intramuscular, Prior to discharge        Outpatient Medications:  No outpatient medications have been marked as taking for the 10/21/20 encounter (Anesthesia Event) with Laine Oquendo MD.      Allergies: Patient has no known allergies.       Anesth 10/21/2020    CREATSERUM 0.93 10/21/2020     (H) 10/21/2020    CA 8.6 10/21/2020            Airway      Mallampati: II  Mouth opening: >3 FB  TM distance: > 6 cm  Neck ROM: full Cardiovascular      Rhythm: regular  Rate: normal  (-) murmur   Dental [COMPLETED] Piperacillin Sod-Tazobactam So (ZOSYN) 3.375 g in dextrose 5 % 100 mL ADD-vantage, 3.375 g, Intravenous, Once    •  [COMPLETED] HYDROmorphone HCl (DILAUDID) 1 MG/ML injection 0.5 mg, 0.5 mg, Intravenous, Once    •  Piperacillin Sod-Tazobactam S Patient has no known allergies. Anesthesia Evaluation    Patient summary reviewed.     Anesthetic Complications  (-) history of anesthetic complications         GI/Hepatic/Renal                                 Cardiovascular        Exercise tolerance: drinks      Drug use: No     Available pre-op labs reviewed.   Lab Results   Component Value Date    WBC 12.2 (H) 10/21/2020    RBC 3.84 10/21/2020    HGB 10.8 (L) 10/21/2020    HCT 33.1 (L) 10/21/2020    MCV 86.2 10/21/2020    MCH 28.1 10/21/2020    MCHC 3

## 2023-10-25 ENCOUNTER — OFFICE VISIT (OUTPATIENT)
Dept: OBGYN CLINIC | Facility: CLINIC | Age: 33
End: 2023-10-25

## 2023-10-25 ENCOUNTER — LAB ENCOUNTER (OUTPATIENT)
Dept: LAB | Age: 33
End: 2023-10-25
Attending: OBSTETRICS & GYNECOLOGY

## 2023-10-25 VITALS
WEIGHT: 223 LBS | SYSTOLIC BLOOD PRESSURE: 112 MMHG | RESPIRATION RATE: 16 BRPM | HEART RATE: 84 BPM | BODY MASS INDEX: 41.04 KG/M2 | HEIGHT: 62 IN | DIASTOLIC BLOOD PRESSURE: 78 MMHG

## 2023-10-25 DIAGNOSIS — N92.0 MENORRHAGIA WITH REGULAR CYCLE: ICD-10-CM

## 2023-10-25 DIAGNOSIS — K64.9 HEMORRHOIDS, UNSPECIFIED HEMORRHOID TYPE: ICD-10-CM

## 2023-10-25 DIAGNOSIS — Z01.419 ENCOUNTER FOR WELL WOMAN EXAM WITH ROUTINE GYNECOLOGICAL EXAM: Primary | ICD-10-CM

## 2023-10-25 DIAGNOSIS — N64.3 GALACTORRHEA ON BOTH SIDES: ICD-10-CM

## 2023-10-25 DIAGNOSIS — Z12.4 SCREENING FOR CERVICAL CANCER: ICD-10-CM

## 2023-10-25 LAB — PROLACTIN SERPL-MCNC: 6 NG/ML

## 2023-10-25 PROCEDURE — 3008F BODY MASS INDEX DOCD: CPT | Performed by: OBSTETRICS & GYNECOLOGY

## 2023-10-25 PROCEDURE — 36415 COLL VENOUS BLD VENIPUNCTURE: CPT

## 2023-10-25 PROCEDURE — 84146 ASSAY OF PROLACTIN: CPT

## 2023-10-25 PROCEDURE — 3074F SYST BP LT 130 MM HG: CPT | Performed by: OBSTETRICS & GYNECOLOGY

## 2023-10-25 PROCEDURE — 99395 PREV VISIT EST AGE 18-39: CPT | Performed by: OBSTETRICS & GYNECOLOGY

## 2023-10-25 PROCEDURE — 80053 COMPREHEN METABOLIC PANEL: CPT

## 2023-10-25 PROCEDURE — 3078F DIAST BP <80 MM HG: CPT | Performed by: OBSTETRICS & GYNECOLOGY

## 2023-10-25 PROCEDURE — 88175 CYTOPATH C/V AUTO FLUID REDO: CPT | Performed by: OBSTETRICS & GYNECOLOGY

## 2023-10-25 PROCEDURE — 87624 HPV HI-RISK TYP POOLED RSLT: CPT | Performed by: OBSTETRICS & GYNECOLOGY

## 2023-10-25 PROCEDURE — 84443 ASSAY THYROID STIM HORMONE: CPT

## 2023-10-26 DIAGNOSIS — N64.3 GALACTORRHEA ON BOTH SIDES: Primary | ICD-10-CM

## 2023-10-26 LAB
ALBUMIN SERPL-MCNC: 3.9 G/DL (ref 3.4–5)
ALBUMIN/GLOB SERPL: 1.2 {RATIO} (ref 1–2)
ALP LIVER SERPL-CCNC: 60 U/L
ALT SERPL-CCNC: 28 U/L
ANION GAP SERPL CALC-SCNC: 5 MMOL/L (ref 0–18)
AST SERPL-CCNC: 12 U/L (ref 15–37)
BILIRUB SERPL-MCNC: 0.4 MG/DL (ref 0.1–2)
BUN BLD-MCNC: 20 MG/DL (ref 7–18)
CALCIUM BLD-MCNC: 9.2 MG/DL (ref 8.5–10.1)
CHLORIDE SERPL-SCNC: 110 MMOL/L (ref 98–112)
CO2 SERPL-SCNC: 26 MMOL/L (ref 21–32)
CREAT BLD-MCNC: 0.99 MG/DL
EGFRCR SERPLBLD CKD-EPI 2021: 78 ML/MIN/1.73M2 (ref 60–?)
GLOBULIN PLAS-MCNC: 3.2 G/DL (ref 2.8–4.4)
GLUCOSE BLD-MCNC: 122 MG/DL (ref 70–99)
OSMOLALITY SERPL CALC.SUM OF ELEC: 296 MOSM/KG (ref 275–295)
POTASSIUM SERPL-SCNC: 4.1 MMOL/L (ref 3.5–5.1)
PROT SERPL-MCNC: 7.1 G/DL (ref 6.4–8.2)
SODIUM SERPL-SCNC: 141 MMOL/L (ref 136–145)
TSI SER-ACNC: 1.15 MIU/ML (ref 0.36–3.74)

## 2023-10-30 ENCOUNTER — PATIENT MESSAGE (OUTPATIENT)
Dept: OBGYN CLINIC | Facility: CLINIC | Age: 33
End: 2023-10-30

## 2023-10-30 LAB
.: NORMAL
.: NORMAL

## 2023-11-01 ENCOUNTER — TELEPHONE (OUTPATIENT)
Dept: OBGYN CLINIC | Facility: CLINIC | Age: 33
End: 2023-11-01

## 2023-11-01 LAB — HPV I/H RISK 1 DNA SPEC QL NAA+PROBE: NEGATIVE

## 2024-01-18 ENCOUNTER — OFFICE VISIT (OUTPATIENT)
Dept: OBGYN CLINIC | Facility: CLINIC | Age: 34
End: 2024-01-18
Payer: COMMERCIAL

## 2024-01-18 VITALS
BODY MASS INDEX: 41.86 KG/M2 | HEIGHT: 62 IN | WEIGHT: 227.5 LBS | DIASTOLIC BLOOD PRESSURE: 72 MMHG | HEART RATE: 83 BPM | SYSTOLIC BLOOD PRESSURE: 114 MMHG

## 2024-01-18 DIAGNOSIS — Z30.430 ENCOUNTER FOR INSERTION OF INTRAUTERINE CONTRACEPTIVE DEVICE (IUD): Primary | ICD-10-CM

## 2024-01-18 DIAGNOSIS — Z01.812 PRE-PROCEDURAL LABORATORY EXAMINATION: ICD-10-CM

## 2024-01-18 DIAGNOSIS — N92.0 MENORRHAGIA WITH REGULAR CYCLE: ICD-10-CM

## 2024-01-18 LAB
CONTROL LINE PRESENT WITH A CLEAR BACKGROUND (YES/NO): YES YES/NO
PREGNANCY TEST, URINE: NEGATIVE

## 2024-01-18 PROCEDURE — 3078F DIAST BP <80 MM HG: CPT | Performed by: NURSE PRACTITIONER

## 2024-01-18 PROCEDURE — 3008F BODY MASS INDEX DOCD: CPT | Performed by: NURSE PRACTITIONER

## 2024-01-18 PROCEDURE — 58300 INSERT INTRAUTERINE DEVICE: CPT | Performed by: NURSE PRACTITIONER

## 2024-01-18 PROCEDURE — 3074F SYST BP LT 130 MM HG: CPT | Performed by: NURSE PRACTITIONER

## 2024-01-18 PROCEDURE — 81025 URINE PREGNANCY TEST: CPT | Performed by: NURSE PRACTITIONER

## 2024-01-18 NOTE — PROGRESS NOTES
Subjective:  33 year old    Chief Complaint   Patient presents with    Procedure     Mirena IUD insertion     Pt here today requesting Mirena IUD insertion  Pt has history of bilateral salpingectomy but has been experiencing menorrhagia    Review of Systems:  Pertinent items are noted in the HPI.    Objective:  /72   Pulse 83   Ht 62\"   Wt 227 lb 8 oz (103.2 kg)   LMP 01/15/2024 (Exact Date)       Physical Examination:  General appearance: Well dressed, well nourished in no apparent distress  Neurologic/Psychiatric: Alert and oriented to person, place and time, mood normal, affect appropriate  Abdomen: Soft, non-tender, non-distended, no masses, no hepatosplenomegaly, no hernias, no inguinal lymphadenopathy  Pelvic:    External genitalia- Normal, Bartholin's, urethra, skeins glands normal   Vagina- No vaginal lesions, small menstrual blood in vaginal vault   Cervix- No lesions, long/closed, no cervical motion tenderness   Uterus- Normal, non-tender, no masses   Adnexa-  Non-tender, no masses    Assessment/Plan:    Diagnoses and all orders for this visit:    Encounter for insertion of intrauterine contraceptive device (IUD)  -     Levonorgestrel (Mirena) IUD 1 each  -     Insert IUD [28071]    Pre-procedural laboratory examination  -     Urine Preg Test [38173]    Menorrhagia with regular cycle  -     Levonorgestrel (Mirena) IUD 1 each  -     Insert IUD [[id number]]        Return in about 4 weeks (around 2/15/2024) for IUD check.

## 2024-01-18 NOTE — PATIENT INSTRUCTIONS
[facility] Department of OB/GYN  After Care Instructions for IUD      Bleeding   You may experience irregular bleeding for the fist 3-6 months.  If your bleeding becomes heavier than a normal menstrual cycle, please contact our office.    Pain  You may experience mild menstrual cramping after the IUD insertion that will typically last 24-48hrs.  You may take Ibuprofen, Tylenol or Aleve to relieve the discomfort.  If you experience severe or persistent pain, please contact our office.       Restrictions    You should avoid sexual intercourse or tampon use for 1 day after insertion.  Please use a backup method of contraception for 4-7 days with the Mirena and Janeth.    When to contact our office  If you are experiencing discomfort described as worse than menstrual cramps that is not relieved by ibuprofen   Fever of 100.4 or greater  Vaginal bleeding that is saturating 1 pad per hour    Any discomfort or poking sensation during intercourse or other sexual activity      Follow up in 4-6 weeks for IUD check.   If you have additional questions or concerns, please call us at [phone].

## 2024-01-18 NOTE — PROCEDURES
Procedure:  IUD insertion, Mirena    Indications:   Patient is a 33 year old  who presents for Mirena IUD insertion. Risks were discussed, including bleeding, infection, and uterine perforation. Patient's last menstrual period was 01/15/2024 (exact date). A urine HCG was performed and was negative. Consent form signed.      Procedure:  Patient was taken to the procedure room. A sterile speculum was placed and the cervix was visualized and cleansed with betadine. A single toothed tenaculum was placed. The uterus was sounded to 9 cm. The Mirena IUD was then placed without difficulty under 's instructions. The strings were trimmed to 2-3 cm. The tenaculum was removed. Sites hemostatic with pressure. The speculum was removed.    Postprocedure instructions were reviewed, including pelvic rest. She was advised to return in 4 weeks for an IUD string check.

## 2024-06-18 ENCOUNTER — OFFICE VISIT (OUTPATIENT)
Dept: INTERNAL MEDICINE CLINIC | Facility: CLINIC | Age: 34
End: 2024-06-18

## 2024-06-18 ENCOUNTER — LAB ENCOUNTER (OUTPATIENT)
Dept: LAB | Age: 34
End: 2024-06-18
Attending: FAMILY MEDICINE

## 2024-06-18 VITALS
HEART RATE: 94 BPM | SYSTOLIC BLOOD PRESSURE: 122 MMHG | WEIGHT: 231 LBS | OXYGEN SATURATION: 98 % | HEIGHT: 62 IN | BODY MASS INDEX: 42.51 KG/M2 | DIASTOLIC BLOOD PRESSURE: 78 MMHG

## 2024-06-18 DIAGNOSIS — Z13.1 SCREENING FOR DIABETES MELLITUS: ICD-10-CM

## 2024-06-18 DIAGNOSIS — Z13.29 SCREENING FOR THYROID DISORDER: ICD-10-CM

## 2024-06-18 DIAGNOSIS — Z13.220 SCREENING, LIPID: ICD-10-CM

## 2024-06-18 DIAGNOSIS — Z13.0 SCREENING FOR DEFICIENCY ANEMIA: ICD-10-CM

## 2024-06-18 DIAGNOSIS — E66.01 CLASS 3 SEVERE OBESITY DUE TO EXCESS CALORIES WITHOUT SERIOUS COMORBIDITY WITH BODY MASS INDEX (BMI) OF 40.0 TO 44.9 IN ADULT (HCC): ICD-10-CM

## 2024-06-18 DIAGNOSIS — Z00.00 ANNUAL PHYSICAL EXAM: Primary | ICD-10-CM

## 2024-06-18 LAB
ALBUMIN SERPL-MCNC: 3.8 G/DL (ref 3.4–5)
ALBUMIN/GLOB SERPL: 1.1 {RATIO} (ref 1–2)
ALP LIVER SERPL-CCNC: 56 U/L
ALT SERPL-CCNC: 20 U/L
ANION GAP SERPL CALC-SCNC: 7 MMOL/L (ref 0–18)
AST SERPL-CCNC: 12 U/L (ref 15–37)
BASOPHILS # BLD AUTO: 0.04 X10(3) UL (ref 0–0.2)
BASOPHILS NFR BLD AUTO: 0.5 %
BILIRUB SERPL-MCNC: 0.7 MG/DL (ref 0.1–2)
BUN BLD-MCNC: 16 MG/DL (ref 9–23)
CALCIUM BLD-MCNC: 9.1 MG/DL (ref 8.5–10.1)
CHLORIDE SERPL-SCNC: 109 MMOL/L (ref 98–112)
CHOLEST SERPL-MCNC: 145 MG/DL (ref ?–200)
CO2 SERPL-SCNC: 22 MMOL/L (ref 21–32)
CREAT BLD-MCNC: 0.96 MG/DL
EGFRCR SERPLBLD CKD-EPI 2021: 81 ML/MIN/1.73M2 (ref 60–?)
EOSINOPHIL # BLD AUTO: 0.44 X10(3) UL (ref 0–0.7)
EOSINOPHIL NFR BLD AUTO: 5.3 %
ERYTHROCYTE [DISTWIDTH] IN BLOOD BY AUTOMATED COUNT: 14.4 %
FASTING PATIENT LIPID ANSWER: YES
FASTING STATUS PATIENT QL REPORTED: YES
GLOBULIN PLAS-MCNC: 3.6 G/DL (ref 2.8–4.4)
GLUCOSE BLD-MCNC: 96 MG/DL (ref 70–99)
HCT VFR BLD AUTO: 36.8 %
HDLC SERPL-MCNC: 48 MG/DL (ref 40–59)
HGB BLD-MCNC: 12.2 G/DL
IMM GRANULOCYTES # BLD AUTO: 0.03 X10(3) UL (ref 0–1)
IMM GRANULOCYTES NFR BLD: 0.4 %
LDLC SERPL CALC-MCNC: 84 MG/DL (ref ?–100)
LYMPHOCYTES # BLD AUTO: 2.65 X10(3) UL (ref 1–4)
LYMPHOCYTES NFR BLD AUTO: 32 %
MCH RBC QN AUTO: 29.6 PG (ref 26–34)
MCHC RBC AUTO-ENTMCNC: 33.2 G/DL (ref 31–37)
MCV RBC AUTO: 89.3 FL
MONOCYTES # BLD AUTO: 0.64 X10(3) UL (ref 0.1–1)
MONOCYTES NFR BLD AUTO: 7.7 %
NEUTROPHILS # BLD AUTO: 4.47 X10 (3) UL (ref 1.5–7.7)
NEUTROPHILS # BLD AUTO: 4.47 X10(3) UL (ref 1.5–7.7)
NEUTROPHILS NFR BLD AUTO: 54.1 %
NONHDLC SERPL-MCNC: 97 MG/DL (ref ?–130)
OSMOLALITY SERPL CALC.SUM OF ELEC: 287 MOSM/KG (ref 275–295)
PLATELET # BLD AUTO: 284 10(3)UL (ref 150–450)
POTASSIUM SERPL-SCNC: 4.4 MMOL/L (ref 3.5–5.1)
PROT SERPL-MCNC: 7.4 G/DL (ref 6.4–8.2)
RBC # BLD AUTO: 4.12 X10(6)UL
SODIUM SERPL-SCNC: 138 MMOL/L (ref 136–145)
TRIGL SERPL-MCNC: 64 MG/DL (ref 30–149)
TSI SER-ACNC: 2.05 MIU/ML (ref 0.36–3.74)
VLDLC SERPL CALC-MCNC: 10 MG/DL (ref 0–30)
WBC # BLD AUTO: 8.3 X10(3) UL (ref 4–11)

## 2024-06-18 PROCEDURE — 99385 PREV VISIT NEW AGE 18-39: CPT | Performed by: FAMILY MEDICINE

## 2024-06-18 PROCEDURE — 80053 COMPREHEN METABOLIC PANEL: CPT

## 2024-06-18 PROCEDURE — 84443 ASSAY THYROID STIM HORMONE: CPT

## 2024-06-18 PROCEDURE — 85025 COMPLETE CBC W/AUTO DIFF WBC: CPT

## 2024-06-18 PROCEDURE — 36415 COLL VENOUS BLD VENIPUNCTURE: CPT

## 2024-06-18 PROCEDURE — 80061 LIPID PANEL: CPT

## 2024-06-18 NOTE — PROGRESS NOTES
Subjective:   Patient ID: Netta Booth is a 33 year old female.    HPIHere for annual check-up. Patient would like to see C as well. Has tried multiple times to lose weight.     History/Other:   Past Medical History:    Allergic rhinitis    Anemia    Anesthesia complication    Has woken up and feels like she is having a panic attack     Antepartum anemia (HCC)    Iron daily [ x] Repeat CBC at 28 weeks-->significant anemia-->heme for IV Iron    Anxiety    Anxiety disorder    Anxiety state    Asthma (HCC)    Depression    Depression during pregnancy in first trimester (HCC)    Eczema    Extrinsic asthma, unspecified    Miscarriage (HCC)    Obesity     Past Surgical History:   Procedure Laterality Date    Abdomen surgery proc unlisted  10/21/2020    ABDOMINAL WOUND EXPLORATION    Adenoidectomy      Appendectomy  2019          Cholecystectomy      D & c  2021      2016    Other surgical history N/A 2015    Procedure: ESOPHAGOGASTRODUODENOSCOPY (EGD);  Surgeon: Shar Sanabria MD;  Location:  MAIN OR    Tonsillectomy       Social History     Socioeconomic History    Marital status: Single   Tobacco Use    Smoking status: Never    Smokeless tobacco: Never   Vaping Use    Vaping status: Never Used   Substance and Sexual Activity    Alcohol use: Not Currently     Alcohol/week: 0.0 standard drinks of alcohol    Drug use: No    Sexual activity: Yes     Partners: Male     Birth control/protection: Tubal Ligation   Other Topics Concern    Caffeine Concern No    Exercise No    Seat Belt No    Special Diet No    Stress Concern No    Weight Concern Yes     Comment: Overweight     Family History   Problem Relation Age of Onset    Depression Mother     Heart Disorder Maternal Grandfather     Hypertension Maternal Grandfather     Bleeding Disorders Maternal Grandfather     Diabetes Maternal Grandfather     Dementia Maternal Grandmother        Review of Systems   Constitutional:  Negative  for activity change, appetite change, fatigue and fever.   HENT:  Negative for ear pain, hearing loss and rhinorrhea.    Eyes:  Negative for visual disturbance.   Respiratory:  Negative for cough and shortness of breath.    Cardiovascular:  Negative for chest pain, palpitations and leg swelling.   Gastrointestinal:  Negative for abdominal pain and constipation.   Endocrine: Negative for polydipsia, polyphagia and polyuria.   Genitourinary:  Negative for dysuria and frequency.   Musculoskeletal:  Negative for arthralgias and joint swelling.   Skin:  Negative for rash.   Neurological:  Negative for dizziness, weakness, numbness and headaches.   Hematological:  Negative for adenopathy. Does not bruise/bleed easily.   Psychiatric/Behavioral:  Negative for dysphoric mood. The patient is not nervous/anxious.      No current outpatient medications on file.     Allergies:  Allergies   Allergen Reactions    Morphine ANXIETY, ITCHING, RASH and RESTLESSNESS       Objective:   Physical Exam  Vitals reviewed.   Constitutional:       Appearance: Normal appearance. She is well-developed.   HENT:      Head: Normocephalic and atraumatic.      Right Ear: Tympanic membrane, ear canal and external ear normal.      Left Ear: Tympanic membrane, ear canal and external ear normal.      Mouth/Throat:      Pharynx: No posterior oropharyngeal erythema.   Eyes:      Conjunctiva/sclera: Conjunctivae normal.      Pupils: Pupils are equal, round, and reactive to light.   Cardiovascular:      Rate and Rhythm: Normal rate and regular rhythm.      Heart sounds: Normal heart sounds.   Pulmonary:      Effort: Pulmonary effort is normal.      Breath sounds: Normal breath sounds.   Skin:     General: Skin is warm and dry.   Neurological:      Mental Status: She is alert.   Psychiatric:         Behavior: Behavior normal.         Assessment & Plan:   1. Annual physical exam    2. Screening, lipid    3. Screening for deficiency anemia    4. Screening for  diabetes mellitus    5. Screening for thyroid disorder    6. Class 3 severe obesity due to excess calories without serious comorbidity with body mass index (BMI) of 40.0 to 44.9 in adult (HCC)    1-5. Reviewed age-appropriate preventive health and safety recommendations with patient. Check labs. Encouraged regular exercise and healthy eating.   6. [facility] referral.       Orders Placed This Encounter   Procedures    Comp Metabolic Panel (14)    Lipid Panel    CBC With Differential With Platelet    TSH W Reflex To Free T4       Meds This Visit:  Requested Prescriptions      No prescriptions requested or ordered in this encounter       Imaging & Referrals:  OP REFERRAL TO [facility]

## 2024-08-23 PROBLEM — F43.10 PTSD (POST-TRAUMATIC STRESS DISORDER): Status: ACTIVE | Noted: 2024-08-23

## 2024-11-25 ENCOUNTER — APPOINTMENT (OUTPATIENT)
Dept: GENERAL RADIOLOGY | Age: 34
End: 2024-11-25
Attending: EMERGENCY MEDICINE
Payer: COMMERCIAL

## 2024-11-25 ENCOUNTER — HOSPITAL ENCOUNTER (EMERGENCY)
Age: 34
Discharge: HOME | End: 2024-11-25
Attending: EMERGENCY MEDICINE
Payer: COMMERCIAL

## 2024-11-25 VITALS
OXYGEN SATURATION: 97 % | HEART RATE: 99 BPM | BODY MASS INDEX: 40.48 KG/M2 | DIASTOLIC BLOOD PRESSURE: 74 MMHG | SYSTOLIC BLOOD PRESSURE: 145 MMHG | TEMPERATURE: 100 F | WEIGHT: 220 LBS | RESPIRATION RATE: 16 BRPM | HEIGHT: 62 IN

## 2024-11-25 DIAGNOSIS — G43.809 OTHER MIGRAINE WITHOUT STATUS MIGRAINOSUS, NOT INTRACTABLE: ICD-10-CM

## 2024-11-25 DIAGNOSIS — J18.9 COMMUNITY ACQUIRED PNEUMONIA, UNSPECIFIED LATERALITY: Primary | ICD-10-CM

## 2024-11-25 LAB
ALBUMIN SERPL-MCNC: 4.8 G/DL (ref 3.2–4.8)
ALBUMIN/GLOB SERPL: 1.8 {RATIO} (ref 1–2)
ALP LIVER SERPL-CCNC: 58 U/L
ALT SERPL-CCNC: 14 U/L
ANION GAP SERPL CALC-SCNC: 5 MMOL/L (ref 0–18)
AST SERPL-CCNC: 15 U/L (ref ?–34)
B-HCG UR QL: NEGATIVE
BASOPHILS # BLD AUTO: 0.02 X10(3) UL (ref 0–0.2)
BASOPHILS NFR BLD AUTO: 0.3 %
BILIRUB SERPL-MCNC: 1.1 MG/DL (ref 0.3–1.2)
BILIRUB UR QL STRIP.AUTO: NEGATIVE
BUN BLD-MCNC: 15 MG/DL (ref 9–23)
CALCIUM BLD-MCNC: 9.7 MG/DL (ref 8.7–10.4)
CHLORIDE SERPL-SCNC: 106 MMOL/L (ref 98–112)
CLARITY UR REFRACT.AUTO: CLEAR
CO2 SERPL-SCNC: 23 MMOL/L (ref 21–32)
COLOR UR AUTO: YELLOW
CREAT BLD-MCNC: 1.01 MG/DL
EGFRCR SERPLBLD CKD-EPI 2021: 76 ML/MIN/1.73M2 (ref 60–?)
EOSINOPHIL # BLD AUTO: 0.08 X10(3) UL (ref 0–0.7)
EOSINOPHIL NFR BLD AUTO: 1.1 %
ERYTHROCYTE [DISTWIDTH] IN BLOOD BY AUTOMATED COUNT: 13.5 %
GLOBULIN PLAS-MCNC: 2.7 G/DL (ref 2–3.5)
GLUCOSE BLD-MCNC: 108 MG/DL (ref 70–99)
GLUCOSE UR STRIP.AUTO-MCNC: NEGATIVE MG/DL
HCT VFR BLD AUTO: 36.5 %
HGB BLD-MCNC: 13.2 G/DL
IMM GRANULOCYTES # BLD AUTO: 0.02 X10(3) UL (ref 0–1)
IMM GRANULOCYTES NFR BLD: 0.3 %
KETONES UR STRIP.AUTO-MCNC: NEGATIVE MG/DL
LEUKOCYTE ESTERASE UR QL STRIP.AUTO: NEGATIVE
LYMPHOCYTES # BLD AUTO: 1.41 X10(3) UL (ref 1–4)
LYMPHOCYTES NFR BLD AUTO: 19.6 %
MCH RBC QN AUTO: 31.1 PG (ref 26–34)
MCHC RBC AUTO-ENTMCNC: 36.2 G/DL (ref 31–37)
MCV RBC AUTO: 86.1 FL
MONOCYTES # BLD AUTO: 0.5 X10(3) UL (ref 0.1–1)
MONOCYTES NFR BLD AUTO: 7 %
NEUTROPHILS # BLD AUTO: 5.16 X10 (3) UL (ref 1.5–7.7)
NEUTROPHILS # BLD AUTO: 5.16 X10(3) UL (ref 1.5–7.7)
NEUTROPHILS NFR BLD AUTO: 71.7 %
NITRITE UR QL STRIP.AUTO: NEGATIVE
OSMOLALITY SERPL CALC.SUM OF ELEC: 279 MOSM/KG (ref 275–295)
PH UR STRIP.AUTO: 5.5 [PH] (ref 5–8)
PLATELET # BLD AUTO: 239 10(3)UL (ref 150–450)
POCT INFLUENZA A: NEGATIVE
POCT INFLUENZA B: NEGATIVE
POTASSIUM SERPL-SCNC: 3.9 MMOL/L (ref 3.5–5.1)
PROT SERPL-MCNC: 7.5 G/DL (ref 5.7–8.2)
PROT UR STRIP.AUTO-MCNC: NEGATIVE MG/DL
RBC # BLD AUTO: 4.24 X10(6)UL
RBC UR QL AUTO: NEGATIVE
SARS-COV-2 RNA RESP QL NAA+PROBE: NOT DETECTED
SODIUM SERPL-SCNC: 134 MMOL/L (ref 136–145)
SP GR UR STRIP.AUTO: 1.02 (ref 1–1.03)
UROBILINOGEN UR STRIP.AUTO-MCNC: 0.2 MG/DL
WBC # BLD AUTO: 7.2 X10(3) UL (ref 4–11)

## 2024-11-25 PROCEDURE — 99284 EMERGENCY DEPT VISIT MOD MDM: CPT

## 2024-11-25 PROCEDURE — 81025 URINE PREGNANCY TEST: CPT

## 2024-11-25 PROCEDURE — 96374 THER/PROPH/DIAG INJ IV PUSH: CPT

## 2024-11-25 PROCEDURE — 80053 COMPREHEN METABOLIC PANEL: CPT | Performed by: EMERGENCY MEDICINE

## 2024-11-25 PROCEDURE — 96375 TX/PRO/DX INJ NEW DRUG ADDON: CPT

## 2024-11-25 PROCEDURE — 87502 INFLUENZA DNA AMP PROBE: CPT | Performed by: EMERGENCY MEDICINE

## 2024-11-25 PROCEDURE — 81003 URINALYSIS AUTO W/O SCOPE: CPT | Performed by: EMERGENCY MEDICINE

## 2024-11-25 PROCEDURE — 71045 X-RAY EXAM CHEST 1 VIEW: CPT | Performed by: EMERGENCY MEDICINE

## 2024-11-25 PROCEDURE — 85025 COMPLETE CBC W/AUTO DIFF WBC: CPT | Performed by: EMERGENCY MEDICINE

## 2024-11-25 PROCEDURE — 96361 HYDRATE IV INFUSION ADD-ON: CPT

## 2024-11-25 RX ORDER — AZITHROMYCIN 250 MG/1
TABLET, FILM COATED ORAL
Qty: 6 TABLET | Refills: 0 | Status: SHIPPED | OUTPATIENT
Start: 2024-11-25 | End: 2024-11-30

## 2024-11-25 RX ORDER — DIPHENHYDRAMINE HYDROCHLORIDE 50 MG/ML
25 INJECTION INTRAMUSCULAR; INTRAVENOUS ONCE
Status: COMPLETED | OUTPATIENT
Start: 2024-11-25 | End: 2024-11-25

## 2024-11-25 RX ORDER — METOCLOPRAMIDE HYDROCHLORIDE 5 MG/ML
5 INJECTION INTRAMUSCULAR; INTRAVENOUS ONCE
Status: COMPLETED | OUTPATIENT
Start: 2024-11-25 | End: 2024-11-25

## 2024-11-25 RX ORDER — KETOROLAC TROMETHAMINE 15 MG/ML
15 INJECTION, SOLUTION INTRAMUSCULAR; INTRAVENOUS ONCE
Status: COMPLETED | OUTPATIENT
Start: 2024-11-25 | End: 2024-11-25

## 2024-11-25 RX ORDER — SODIUM CHLORIDE 9 MG/ML
INJECTION, SOLUTION INTRAVENOUS ONCE
Status: COMPLETED | OUTPATIENT
Start: 2024-11-25 | End: 2024-11-25

## 2024-11-25 RX ORDER — AMOXICILLIN 500 MG/1
1000 TABLET, FILM COATED ORAL 3 TIMES DAILY
Qty: 30 TABLET | Refills: 0 | Status: SHIPPED | OUTPATIENT
Start: 2024-11-25 | End: 2024-11-30

## 2024-11-25 NOTE — ED PROVIDER NOTES
Patient Seen in: [facility] In [place]      History     Chief Complaint   Patient presents with    Fever    Headache     Stated Complaint: Fever, headache, vomiting x 4 days    Subjective:   HPI      Patient is a very pleasant 34-year-old female who presents with cough congestion and fevers.  Fever as high as 102 at home per patient.  Says her child had a pneumonia at home.  She also has a history of migraines.  She has a headache consistent with her previous migraines.  Says been more persistent over the last 4 days and worsens with coughing.  No focal numbness or weakness.  No neck stiffness.  No chest pain.  Mild shortness of breath.  Temp is 100.2 here.  No other specific complaints.    Objective:     Past Medical History:    Allergic rhinitis    Anemia    Anesthesia complication    Has woken up and feels like she is having a panic attack     Antepartum anemia (HCC)    Iron daily [ x] Repeat CBC at 28 weeks-->significant anemia-->heme for IV Iron    Anxiety    Anxiety disorder    Anxiety state    Asthma (HCC)    Depression    Depression during pregnancy in first trimester (HCC)    Eczema    Extrinsic asthma, unspecified    Miscarriage (HCC)    Obesity              Past Surgical History:   Procedure Laterality Date    Abdomen surgery proc unlisted  10/21/2020    ABDOMINAL WOUND EXPLORATION    Adenoidectomy      Appendectomy  2019          Cholecystectomy      D & c  2021      2016    Other surgical history N/A 2015    Procedure: ESOPHAGOGASTRODUODENOSCOPY (EGD);  Surgeon: Shar Sanabria MD;  Location:  MAIN OR    Tonsillectomy                  Social History     Socioeconomic History    Marital status: Single   Tobacco Use    Smoking status: Never    Smokeless tobacco: Never   Vaping Use    Vaping status: Never Used   Substance and Sexual Activity    Alcohol use: Not Currently     Alcohol/week: 0.0 standard drinks of alcohol    Drug use: No     Sexual activity: Yes     Partners: Male     Birth control/protection: Tubal Ligation   Other Topics Concern    Caffeine Concern No    Exercise No    Seat Belt No    Special Diet No    Stress Concern No    Weight Concern Yes     Comment: Overweight                  Physical Exam     ED Triage Vitals [11/25/24 0757]   /74   Pulse 111   Resp 20   Temp 100.2 °F (37.9 °C)   Temp src Oral   SpO2 97 %   O2 Device None (Room air)       Current Vitals:   Vital Signs  BP: 145/74  Pulse: 111  Resp: 20  Temp: 100.2 °F (37.9 °C)  Temp src: Oral    Oxygen Therapy  SpO2: 97 %  O2 Device: None (Room air)        Physical Exam  General: Patient is resting comfortably in no acute distress  HEENT: Normal cephalic atraumatic.  Nonicteric sclera.  Moist mucous membranes.  No meningismus.  No adenopathy  Lungs: Coarse breath sounds with good air movement.  Cardiac: No tachycardia.  No murmurs.  Regular rate and rhythm.  Abdomen: Soft and nontender throughout.  No rebound or guarding  Extremities: No clubbing/cyanosis/edema.  Skin: No rashes, no pallor  Neuro: Awake oriented ×3.  Nonfocal.  Good strength throughout    ED Course     Labs Reviewed   COMP METABOLIC PANEL (14) - Abnormal; Notable for the following components:       Result Value    Glucose 108 (*)     Sodium 134 (*)     All other components within normal limits   RAPID SARS-COV-2 BY PCR - Normal   POCT FLU TEST - Normal    Narrative:     This assay is a rapid molecular in vitro test utilizing nucleic acid amplification of influenza A and B viral RNA.   CBC WITH DIFFERENTIAL WITH PLATELET   URINALYSIS WITH CULTURE REFLEX            Chest x-ray: I personally reviewed the films and my independent interpertaion showed infiltrate at the right lung base.  Official report reviewed reticular increased densities bilaterally.  Flu negative, COVID-negative.  White count 7.2.   Blood work reviewed white count normal.  Sodium 134.  Electrolytes otherwise normal.  MDM      Patient with  fevers cough and headache.  History of migraines.  She is well-appearing smiling on exam.  No meningismus or signs of meningitis.  Suspect bronchitis versus pneumonia triggering a migraine.  Will treat her migraine.  Will check chest x-ray.    Chest x-ray with increased interstitial markings.  Pneumonia.  Viral versus other.  Given her symptoms we will treat for possible bacterial pneumonia with amoxicillin, Z-Rosalino.  Stay well-hydrated.  Tylenol/ibuprofen as needed.  Follow-up with her doctor.  Return if worsening symptoms or new complaints      Patient felt much better after treatment for migraine.  Sleeping easily arousable.  Will treat her pneumonia.  Possibly viral versus bacterial.  5 days of amoxicillin with Z-Rosalino.  Stay well-hydrated.  Follow-up with her doctor.  Return if worsening symptoms or new complaints.  Medical Decision Making      Disposition and Plan     Clinical Impression:  1. Community acquired pneumonia, unspecified laterality    2. Other migraine without status migrainosus, not intractable         Disposition:  There is no disposition on file for this visit.  There is no disposition time on file for this visit.    Follow-up:  Juliana Gibbons DO  [address] [place] [address], [place] [address]  [phone]    Follow up in 1 week(s)            Medications Prescribed:  Current Discharge Medication List        START taking these medications    Details   amoxicillin 500 MG Oral Tab Take 2 tablets (1,000 mg total) by mouth 3 (three) times daily for 5 days.  Qty: 30 tablet, Refills: 0      azithromycin (ZITHROMAX Z-ROSALINO) 250 MG Oral Tab 500 mg once followed by 250 mg daily x 4 days  Qty: 6 tablet, Refills: 0                 Supplementary Documentation:

## 2024-11-25 NOTE — ED INITIAL ASSESSMENT (HPI)
Pt to ed with c/o fever, cough with post-tussive vomiting, and headache x 4 days, son recently diagnosed with pneumonia. Last dose of tylenol at 04:00 this AM.

## 2024-11-25 NOTE — DISCHARGE INSTRUCTIONS
Antibiotics as prescribed, stay well-hydrated.  Follow-up with your doctor.  Return if worsening headache, feeling worse anyway, new complaints.  Off work for the next 3 days.

## 2024-11-27 ENCOUNTER — APPOINTMENT (OUTPATIENT)
Dept: GENERAL RADIOLOGY | Facility: HOSPITAL | Age: 34
End: 2024-11-27
Attending: EMERGENCY MEDICINE
Payer: COMMERCIAL

## 2024-11-27 ENCOUNTER — HOSPITAL ENCOUNTER (EMERGENCY)
Facility: HOSPITAL | Age: 34
Discharge: HOME | End: 2024-11-28
Attending: EMERGENCY MEDICINE
Payer: COMMERCIAL

## 2024-11-27 DIAGNOSIS — J18.9 COMMUNITY ACQUIRED PNEUMONIA, UNSPECIFIED LATERALITY: Primary | ICD-10-CM

## 2024-11-27 DIAGNOSIS — G43.809 OTHER MIGRAINE WITHOUT STATUS MIGRAINOSUS, NOT INTRACTABLE: ICD-10-CM

## 2024-11-27 LAB
ALBUMIN SERPL-MCNC: 4.4 G/DL (ref 3.2–4.8)
ALBUMIN/GLOB SERPL: 1.3 {RATIO} (ref 1–2)
ALP LIVER SERPL-CCNC: 56 U/L
ALT SERPL-CCNC: 43 U/L
ANION GAP SERPL CALC-SCNC: 8 MMOL/L (ref 0–18)
AST SERPL-CCNC: 50 U/L (ref ?–34)
BILIRUB SERPL-MCNC: 1 MG/DL (ref 0.3–1.2)
BUN BLD-MCNC: 12 MG/DL (ref 9–23)
CALCIUM BLD-MCNC: 9.3 MG/DL (ref 8.7–10.4)
CHLORIDE SERPL-SCNC: 105 MMOL/L (ref 98–112)
CO2 SERPL-SCNC: 22 MMOL/L (ref 21–32)
CREAT BLD-MCNC: 1.07 MG/DL
EGFRCR SERPLBLD CKD-EPI 2021: 71 ML/MIN/1.73M2 (ref 60–?)
GLOBULIN PLAS-MCNC: 3.3 G/DL (ref 2–3.5)
GLUCOSE BLD-MCNC: 107 MG/DL (ref 70–99)
LACTATE SERPL-SCNC: 0.8 MMOL/L (ref 0.5–2)
OSMOLALITY SERPL CALC.SUM OF ELEC: 280 MOSM/KG (ref 275–295)
POTASSIUM SERPL-SCNC: 3.6 MMOL/L (ref 3.5–5.1)
PROT SERPL-MCNC: 7.7 G/DL (ref 5.7–8.2)
SODIUM SERPL-SCNC: 135 MMOL/L (ref 136–145)
TROPONIN I SERPL HS-MCNC: 3 NG/L

## 2024-11-27 PROCEDURE — 83605 ASSAY OF LACTIC ACID: CPT | Performed by: EMERGENCY MEDICINE

## 2024-11-27 PROCEDURE — 99285 EMERGENCY DEPT VISIT HI MDM: CPT

## 2024-11-27 PROCEDURE — 96360 HYDRATION IV INFUSION INIT: CPT

## 2024-11-27 PROCEDURE — 84484 ASSAY OF TROPONIN QUANT: CPT | Performed by: EMERGENCY MEDICINE

## 2024-11-27 PROCEDURE — 71045 X-RAY EXAM CHEST 1 VIEW: CPT | Performed by: EMERGENCY MEDICINE

## 2024-11-27 PROCEDURE — 87040 BLOOD CULTURE FOR BACTERIA: CPT | Performed by: EMERGENCY MEDICINE

## 2024-11-27 PROCEDURE — 80053 COMPREHEN METABOLIC PANEL: CPT | Performed by: EMERGENCY MEDICINE

## 2024-11-27 PROCEDURE — 93005 ELECTROCARDIOGRAM TRACING: CPT

## 2024-11-27 PROCEDURE — 93010 ELECTROCARDIOGRAM REPORT: CPT

## 2024-11-27 PROCEDURE — 85025 COMPLETE CBC W/AUTO DIFF WBC: CPT | Performed by: EMERGENCY MEDICINE

## 2024-11-27 PROCEDURE — 36415 COLL VENOUS BLD VENIPUNCTURE: CPT

## 2024-11-27 RX ORDER — IBUPROFEN 600 MG/1
600 TABLET, FILM COATED ORAL ONCE
Status: COMPLETED | OUTPATIENT
Start: 2024-11-27 | End: 2024-11-27

## 2024-11-27 RX ORDER — ACETAMINOPHEN 500 MG
1000 TABLET ORAL ONCE
Status: DISCONTINUED | OUTPATIENT
Start: 2024-11-27 | End: 2024-11-28

## 2024-11-28 VITALS
HEIGHT: 62 IN | HEART RATE: 97 BPM | RESPIRATION RATE: 21 BRPM | TEMPERATURE: 100 F | DIASTOLIC BLOOD PRESSURE: 58 MMHG | OXYGEN SATURATION: 97 % | BODY MASS INDEX: 41.41 KG/M2 | WEIGHT: 225 LBS | SYSTOLIC BLOOD PRESSURE: 109 MMHG

## 2024-11-28 RX ORDER — CODEINE/BUTALBITAL/ASA/CAFFEIN 30-50-325
1-2 CAPSULE ORAL EVERY 6 HOURS PRN
Qty: 10 CAPSULE | Refills: 0 | Status: SHIPPED | OUTPATIENT
Start: 2024-11-28 | End: 2024-12-03

## 2024-11-28 NOTE — ED PROVIDER NOTES
Patient Seen in: [facility] Emergency Department      History     Chief Complaint   Patient presents with    Difficulty Breathing    Fever     Stated Complaint: dx with wlaking pneumonia, c/o vomiting and chest pressure    Subjective:   HPI      34-year-old female return to the emergency department for pneumonia.  Patient states that on Monday she was diagnosed with bilateral pneumonia she was started on antibiotics she says she just feels just as bad soreness in the chest some difficulty breathing on exertion continued fevers prompting her visit to the emergency department.  She denies any other complaints.    Objective:     Past Medical History:    Allergic rhinitis    Anemia    Anesthesia complication    Has woken up and feels like she is having a panic attack     Antepartum anemia (HCC)    Iron daily [ x] Repeat CBC at 28 weeks-->significant anemia-->heme for IV Iron    Anxiety    Anxiety disorder    Anxiety state    Asthma (HCC)    Depression    Depression during pregnancy in first trimester (HCC)    Eczema    Extrinsic asthma, unspecified    Miscarriage (HCC)    Obesity              Past Surgical History:   Procedure Laterality Date    Abdomen surgery proc unlisted  10/21/2020    ABDOMINAL WOUND EXPLORATION    Adenoidectomy      Appendectomy  2019          Cholecystectomy      D & c  2021      2016    Other surgical history N/A 2015    Procedure: ESOPHAGOGASTRODUODENOSCOPY (EGD);  Surgeon: Shar Sanabria MD;  Location:  MAIN OR    Tonsillectomy                  Social History     Socioeconomic History    Marital status: Single   Tobacco Use    Smoking status: Never    Smokeless tobacco: Never   Vaping Use    Vaping status: Never Used   Substance and Sexual Activity    Alcohol use: Not Currently     Alcohol/week: 0.0 standard drinks of alcohol    Drug use: No    Sexual activity: Yes     Partners: Male     Birth control/protection: Tubal Ligation   Other Topics  Concern    Caffeine Concern No    Exercise No    Seat Belt No    Special Diet No    Stress Concern No    Weight Concern Yes     Comment: Overweight                  Physical Exam     ED Triage Vitals   BP 11/27/24 2244 129/70   Pulse 11/27/24 2244 112   Resp 11/27/24 2244 20   Temp 11/27/24 2245 (!) 102.6 °F (39.2 °C)   Temp src 11/27/24 2245 Oral   SpO2 11/27/24 2244 97 %   O2 Device 11/27/24 2244 None (Room air)       Current Vitals:   Vital Signs  BP: 132/84  Pulse: (!) 123  Resp: 17  Temp: 99.7 °F (37.6 °C)  Temp src: Oral  MAP (mmHg): 100    Oxygen Therapy  SpO2: 98 %  O2 Device: None (Room air)        Physical Exam  Awake alert patient appears no distress HEENT exam is normal with normal photophobia lungs are clear no wheezes retractions rhonchi cardiovascular exam tachycardic abdomen soft nontender extremities no Cyanosis or edema no rash    ED Course     Labs Reviewed   COMP METABOLIC PANEL (14) - Abnormal; Notable for the following components:       Result Value    Glucose 107 (*)     Sodium 135 (*)     Creatinine 1.07 (*)     AST 50 (*)     All other components within normal limits   LACTIC ACID, PLASMA - Normal   TROPONIN I HIGH SENSITIVITY - Normal   CBC WITH DIFFERENTIAL WITH PLATELET   BLOOD CULTURE   BLOOD CULTURE     EKG    Rate, intervals and axes as noted on EKG Report.  Rate: 110  Rhythm: Sinus Rhythm  Reading: Incomplete right bundle branch block                Differential diagnosis includes pneumonia, sepsis       MDM              Medical Decision Making  34-year-old female presenting Emergency Department for pneumonia.  IV established cardiac monitor shows a sinus tachycardia pulse ox shows no signs of hypoxia.  Metabolic panel within acceptable meds lactic acid shows no elevation negative sepsis troponin level shows no elevation independent interpretation by ED physician chest x-ray shows bibasilar atelectasis patient was given antiemetic medication and fluids tachycardia since resolved she  is holding off any heavy redraw for CBC at this time she will be discharged home placed on Fioricet with codeine for her migraine she is return emerged part worsening symptoms with plaints.  The patient was screened and evaluated during this visit.  As a treating physician attending to the patient, I determined, within reasonable clinical confidence and prior to discharge, that an emergency medical condition was not or was no longer present.  There was no indication for further evaluation, treatment or admission on an emergency basis.    The usual and customary discharge instructions were discussed given the patient's ER course.  We discussed signs and symptoms that should prompt the patient's immediate return to the emergency department.  Reasonable over-the-counter and prescription treatment options and physician follow-up plan was discussed.  Patient was discharged home in good condition  This note was prepared using Dragon Medical voice recognition dictation software.  As a result errors may occur.  When identified to these areas have been corrected.  While every attempt is made to correct errors during dictation discrepancies may still exist.  Please contact if there are any errors    Problems Addressed:  Community acquired pneumonia, unspecified laterality: acute illness or injury  Other migraine without status migrainosus, not intractable: acute illness or injury    Amount and/or Complexity of Data Reviewed  Labs: ordered. Decision-making details documented in ED Course.  Radiology: ordered and independent interpretation performed. Decision-making details documented in ED Course.  ECG/medicine tests: ordered and independent interpretation performed. Decision-making details documented in ED Course.        Disposition and Plan     Clinical Impression:  1. Community acquired pneumonia, unspecified laterality    2. Other migraine without status migrainosus, not intractable          Disposition:  Discharge  11/28/2024  1:08 am    Follow-up:  Juliana Gibbons DO  [address], [address]  [place] [address]  [phone]    Follow up in 3 day(s)            Medications Prescribed:  Current Discharge Medication List        START taking these medications    Details   butalbital-aspirin-caffeine-codeine (FIORINAL/CODEINE #3) -15-30 MG Oral Cap Take 1-2 capsules by mouth every 6 (six) hours as needed for Pain, Headaches or Migraine.  Qty: 10 capsule, Refills: 0    Associated Diagnoses: Other migraine without status migrainosus, not intractable                 Supplementary Documentation:                                                            None

## 2024-11-28 NOTE — ED INITIAL ASSESSMENT (HPI)
Pt to ER with complaints \"bilat PNA since Monday\". On azithro and amox. No relieve. Reports fevers of \"102-103\". Easy nl breathing on arrival. Patient took 1 gram tylenol at 2000 PTA

## 2024-11-29 LAB
ATRIAL RATE: 110 BPM
P AXIS: 16 DEGREES
P-R INTERVAL: 138 MS
Q-T INTERVAL: 334 MS
QRS DURATION: 100 MS
QTC CALCULATION (BEZET): 452 MS
R AXIS: 90 DEGREES
T AXIS: 2 DEGREES
VENTRICULAR RATE: 110 BPM

## (undated) DEVICE — ENDOSCOPIC LINEAR CUTTER RELOADS BLUE 3.5MM: Brand: ECHELON; ENDOPATH

## (undated) DEVICE — SUTURE MONOCRYL 4-0 PS-2

## (undated) DEVICE — KENDALL SCD EXPRESS SLEEVES, KNEE LENGTH, MEDIUM: Brand: KENDALL SCD

## (undated) DEVICE — GOWN,SIRUS,FABRIC-REINFORCED,LARGE: Brand: MEDLINE

## (undated) DEVICE — SUTURE ETHILON 2-0 FS

## (undated) DEVICE — SOL  .9 1000ML BTL

## (undated) DEVICE — TROCAR: Brand: KII® SLEEVE

## (undated) DEVICE — NET SPECIMEN RETRIEVAL BLUE

## (undated) DEVICE — CANISTER SAFETOUCH SYST DISP

## (undated) DEVICE — LAP CHOLE/APPY CDS-LF: Brand: MEDLINE INDUSTRIES, INC.

## (undated) DEVICE — TROCAR: Brand: KII SHIELDED BLADED ACCESS SYSTEM

## (undated) DEVICE — TIGERTAIL 5F FLXTIP 70CM

## (undated) DEVICE — ROTH NET FOOD BOLUS

## (undated) DEVICE — LIGASURE LAP MARYLAND 37CM

## (undated) DEVICE — ENDOPATH ULTRA VERESS INSUFFLATION NEEDLES WITH LUER LOCK CONNECTORS: Brand: ENDOPATH

## (undated) DEVICE — GAUZE,PACKING STRIP,IODOFORM,1/2"X5YD,ST: Brand: CURAD

## (undated) DEVICE — TROCARS: Brand: KII® BALLOON BLUNT TIP SYSTEM

## (undated) DEVICE — Device: Brand: DEFENDO AIR/WATER/SUCTION AND BIOPSY VALVE

## (undated) DEVICE — OMNIPAQUE 300 POLYB 50ML

## (undated) DEVICE — STERILE POLYISOPRENE POWDER-FREE SURGICAL GLOVES: Brand: PROTEXIS

## (undated) DEVICE — 3M™ RED DOT™ MONITORING ELECTRODE WITH FOAM TAPE AND STICKY GEL, 50/BAG, 20/CASE, 72/PLT 2570: Brand: RED DOT™

## (undated) DEVICE — TROCAR: Brand: KII FIOS FIRST ENTRY

## (undated) DEVICE — MONOFILAMENT ABSORBABLE SUTURE: Brand: MAXON

## (undated) DEVICE — STERILE SYNTHETIC POLYISOPRENE POWDER-FREE SURGICAL GLOVES WITH HYDROGEL COATING: Brand: PROTEXIS

## (undated) DEVICE — LIGHT HANDLE

## (undated) DEVICE — CHLORAPREP 26ML APPLICATOR

## (undated) DEVICE — GYN CDS: Brand: MEDLINE INDUSTRIES, INC.

## (undated) DEVICE — TISSUE RETRIEVAL SYSTEM: Brand: INZII RETRIEVAL SYSTEM

## (undated) DEVICE — GAUZE SPONGES,USP TYPE VII GAUZE, 12 PLY: Brand: CURITY

## (undated) DEVICE — CAUTERY PENCIL

## (undated) DEVICE — THE ECHELON FLEX POWERED PLUS ARTICULATING ENDOSCOPIC LINEAR CUTTERS ARE STERILE, SINGLE PATIENT USE INSTRUMENTS THAT SIMULTANEOUSLYCUT AND STAPLE TISSUE. THERE ARE SIX STAGGERED ROWS OF STAPLES, THREE ON EITHER SIDE OF THE CUT LINE. THE ECHELON FLEX 45 POWERED PLUSINSTRUMENTS HAVE A STAPLE LINE THAT IS APPROXIMATELY 45 MM LONG AND A CUT LINE THAT IS APPROXIMATELY 42 MM LONG. THE SHAFT CAN ROTATE FREELYIN BOTH DIRECTIONS AND AN ARTICULATION MECHANISM ENABLES THE DISTAL PORTION OF THE SHAFT TO PIVOT TO FACILITATE LATERAL ACCESS TO THE OPERATIVESITE.THE INSTRUMENTS ARE PACKAGED WITH A PRIMARY LITHIUM BATTERY PACK THAT MUST BE INSTALLED PRIOR TO USE. THERE ARE SPECIFIC REQUIREMENTS FORDISPOSING OF THE BATTERY PACK. REFER TO THE BATTERY PACK DISPOSAL SECTION.THE INSTRUMENTS ARE PACKAGED WITHOUT A RELOAD AND MUST BE LOADED PRIOR TO USE. A STAPLE RETAINING CAP ON THE RELOAD PROTECTS THE STAPLE LEGPOINTS DURING SHIPPING AND TRANSPORTATION. THE INSTRUMENTS’ LOCK-OUT FEATURE IS DESIGNED TO PREVENT A USED OR IMPROPERLY INSTALLED RELOADFROM BEING REFIRED OR AN INSTRUMENT FROM BEING FIRED WITHOUT A RELOAD.: Brand: ECHELON FLEX

## (undated) DEVICE — ENSEAL X1 TISSUE SEALER, CURVED JAW, 25 CM SHAFT LENGTH: Brand: ENSEAL

## (undated) DEVICE — DERMABOND LIQUID ADHESIVE

## (undated) DEVICE — DRAIN RELIAVAC 100CC

## (undated) DEVICE — DRAPE C-ARM UNIVERSAL

## (undated) DEVICE — VIOLET BRAIDED (POLYGLACTIN 910), SYNTHETIC ABSORBABLE SUTURE: Brand: COATED VICRYL

## (undated) DEVICE — FILTERLINE NASAL ADULT O2/CO2

## (undated) DEVICE — ENDOSCOPY PACK UPPER: Brand: MEDLINE INDUSTRIES, INC.

## (undated) DEVICE — 1200CC GUARDIAN II: Brand: GUARDIAN

## (undated) DEVICE — CURRETTE 8MM CVD

## (undated) DEVICE — VISUALIZATION SYSTEM: Brand: CLEARIFY

## (undated) DEVICE — DRAIN CHANNEL 19FR BLAKE

## (undated) DEVICE — DISPOSABLE LAPAROSCOPIC CLIP APPLIER WITH 20 CLIPS.: Brand: EPIX® UNIVERSAL CLIP APPLIER

## (undated) DEVICE — ZIPWIRE GUIDEWIRE .035X150 STR

## (undated) DEVICE — Device

## (undated) DEVICE — GENERAL LAPAROS CDS-LF: Brand: MEDLINE INDUSTRIES, INC.

## (undated) DEVICE — TUBING SUCTION COLLECTION SET

## (undated) DEVICE — ABSORBABLE HEMOSTAT (OXIDIZED REGENERATED CELLULOSE, U.S.P.): Brand: SURGICEL

## (undated) DEVICE — FORCEP BIOPSY RJ4 LG CAP W/ND

## (undated) NOTE — ED AVS SNAPSHOT
Adelina Kayser   MRN: [id number]    Department:  [facility] Emergency Department   Date of Visit:  3/23/2019           Disclosure     Insurance plans vary and the physician(s) referred by the ER may not be covered by your plan.  Please contact your tell this physician (or your personal doctor if your instructions are to return to your personal doctor) about any new or lasting problems. The primary care or specialist physician will see patients referred from the [facility] Emergency Department.  Isaias Chandler

## (undated) NOTE — LETTER
Katie Esparza 182  [address] [facility] [address], 209 [facility]  Authorization for Surgical Operation and Procedure     Date:___________                                                                                                         Time:__________ 4.   Should the need arise during my operation or immediate post-operative period, I also consent to the administration of blood and/or blood products.   Further, I understand that despite careful testing and screening of blood or blood products by susie 8.   I recognize that in the event my procedure results in extended X-Ray/fluoroscopy time, I may develop a skin reaction. 9.  If I have a Do Not Attempt Resuscitation (DNAR) order in place, that status will be suspended while in the operating room, proc 1. I, Bhupendra Ka agree to be cared for by an anesthesiologist, who is specially trained to monitor me and give me medicine to put me to sleep or keep me comfortable during my procedure    I understand that my anesthesiologist is not an employee or agent 5. My doctor has explained to me other choices available to me for my care (alternatives).   6. Pregnant Patients (“epidural”):  I understand that the risks of having an epidural (medicine given into my back to help control pain during labor), include itchi

## (undated) NOTE — LETTER
Patient Name: Hal Pope  : 1990  MRN: [id number]  Patient Address: [address] [place] [address] Palmer Kaur  [address] [facility] [address]      Coronavirus Disease 2019 (COVID-19)     [facility] is committed to the safety and well-being of our patients, members, [place] If your symptoms get worse, call your healthcare provider immediately. 3. Get rest and stay hydrated.    4. If you have a medical appointment, call the healthcare provider ahead of time and tell them that you have or may have COVID-19.  5. For medical kolby fever-reducing medications; and  · Improvement in respiratory symptoms (e.g., cough, shortness of breath); and  · At least 10 days have passed since symptoms first appeared OR if asymptomatic patient or date of symptom onset is unclear then use 10 days pos donors must:    · Have had a confirmed diagnosis of COVID-19  · Be symptom-free for at least 14 days*    *Some people will be required to have a repeat COVID-19 test in order to be eligible to donate.  If you’re instructed by Little that a repeat test is r random. Researchers are trying to identify similarities between people with a Post-COVID condition to better understand if there are risk factors. How do I prevent a Post-COVID condition?   The best way to prevent the long-term symptoms of COVID-19 is

## (undated) NOTE — LETTER
Katie Esparza 182  295 [facility] [address], 209 [facility]  Authorization for Surgical Operation and Procedure     Date:___________                                                                                                         Time:__________ 4.   Should the need arise during my operation or immediate post-operative period, I also consent to the administration of blood and/or blood products.   Further, I understand that despite careful testing and screening of blood or blood products by susie 8.   I recognize that in the event my procedure results in extended X-Ray/fluoroscopy time, I may develop a skin reaction. 9.  If I have a Do Not Attempt Resuscitation (DNAR) order in place, that status will be suspended while in the operating room, proc 1. IWade agree to be cared for by an anesthesiologist, who is specially trained to monitor me and give me medicine to put me to sleep or keep me comfortable during my procedure    I understand that my anesthesiologist is not an employee or agent 5. My doctor has explained to me other choices available to me for my care (alternatives).   6. Pregnant Patients (“epidural”):  I understand that the risks of having an epidural (medicine given into my back to help control pain during labor), include itchi

## (undated) NOTE — LETTER
[facility] [address], [address] [facility]    Consent for Anesthesia   1.    ISukh agree to be cared for by an anesthesiologist, who is specially trained to monitor me and give me medicine to put me to sleep or keep me comfortabl vision, nerves, or muscles and in extremely rare instances death. 5. My doctor has explained to me other choices available to me for my care (alternatives).   6. Pregnant Patients (“epidural”):  I understand that the risks of having an epidural (medicine g

## (undated) NOTE — LETTER
10/19/2020    Return to School / Work    Name: Evelio Slater        : 1990    To Whom It May Concern,    Netta Pabon had surgery on 10/12/2020 and is:    Able to return to school / work with restrictions:  No lifting over: 20 lbs until 2020.

## (undated) NOTE — ED AVS SNAPSHOT
THE [facility] Emergency Department in [facility] [address]  Phone:  [phone]  Fax:  [phone]          Jaycee Longo   MRN: [id number]    Department:  THE [facility] Emergency Department in [place]   Date of Visit:  7/15/2017 IF THERE IS ANY CHANGE OR WORSENING OF YOUR CONDITION, CALL YOUR PRIMARY CARE PHYSICIAN AT ONCE OR RETURN IMMEDIATELY TO THE EMERGENCY DEPARTMENT.     If you have been prescribed any medication(s), please fill your prescription right away and begin taking t

## (undated) NOTE — LETTER
Katie Esparza 182 6 [address]  [place], [address] [place] [address]    Consent for Operation  Date: __________________                                Time: _______________    1.  I authorize the performance upon Medtronic the following operation:  Procedure(s procedure has been videotaped, the surgeon will obtain the original videotape. The hospital will not be responsible for storage or maintenance of this tape.     6. For the purpose of advancing medical education, I consent to the admittance of observers to t STATEMENTS REQUIRING INSERTION OR COMPLETION WERE FILLED IN.     Signature of Patient:   ___________________________    When the patient is a minor or mentally incompetent to give consent:  Signature of person authorized to consent for patient: ____________

## (undated) NOTE — LETTER
21      Netta Knowles        :  1990        To Whom It May Concern:    Mattie Rey  has recently been treated for a medical condition. At this time, I have determined she may return to work effective 21, without restrictions.     If yo

## (undated) NOTE — ED AVS SNAPSHOT
Obed Rodríguez   MRN: [id number]    Department:  [facility] Emergency Department in [place]   Date of Visit:  10/4/2017           Disclosure     Insurance plans vary and the physician(s) referred by the ER may not be covered by your plan.  Please contact If you have been prescribed any medication(s), please fill your prescription right away and begin taking the medication(s) as directed    If the emergency physician has read X-rays, these will be re-interpreted by a radiologist.  If there is a significant

## (undated) NOTE — LETTER
October 4, 2017    Patient: Casie Parisi   Date of Visit: 10/4/2017       To Whom It May Concern:    Chel Troy was seen and treated in our emergency department on 10/4/2017. She should not return to work until 10/8/17.     If you have any questions o

## (undated) NOTE — LETTER
2020    Return to Work    Name: Alban Mcghee        : 1990    To Whom It May Concern,    Netta Deshpande had surgery on 10/12/2020 and a subsequent procedure on 10/21/2020 and is:    Able to return to work with no restrictions on

## (undated) NOTE — ED AVS SNAPSHOT
Wade Shelter   MRN: [id number]    Department:  [facility] in [place]   Date of Visit:  12/18/2018           Disclosure     Insurance plans vary and the physician(s) referred by the ER may not be covered by your plan.  Please contact tell this physician (or your personal doctor if your instructions are to return to your personal doctor) about any new or lasting problems. The primary care or specialist physician will see patients referred from the [facility] Emergency Department.  Maximilian Marcus

## (undated) NOTE — LETTER
Netta Booth, :1990    CONSENT FOR PROCEDURE/SEDATION    1. I authorize the performance upon Netta Booth  the following: Intrauterine Device Mirena insertion    2. I authorize NAKUL Smith (and whomever is designated as the doctor’s assistant), to perform the above-mentioned procedures.    3. If any unforeseen conditions arise during this procedure calling for additional  procedures, operations, or medications (including anesthesia and blood transfusion), I further request and authorize the doctor to do whatever he/she deems advisable in my interest.    4. I consent to the taking and reproduction of any photographs in the course of this procedure for professional purposes.    5. I consent to the administration of such sedation as may be considered necessary or advisable by the physician responsible for this service, with the exception of ______________________________________________________    6. I have been informed by my doctor of the nature and purpose of this procedure sedation, possible alternative methods of treatment, risk involved and possible complications.    7. If I have a Do Not Resuscitate (DNR) order in place, the physician and I (or the individual authorized to consent on my behalf) will discuss and agree as to whether the Do Not Resuscitate (DNR) order will remain in effect or will be discontinued during the performance of the procedure and the applicable recovery period. If the Do Not Resuscitate (DNR) order is discontinued and is to be reinstated following the procedure/recovery period, the physician will determine when the applicable recovery period ends for purposes of reinstating the Do Not Resuscitate (DNR) order.    Signature of Patient:_______________________________________________    Signature of person authorized to consent for patient:  _______________________________________________________________    Relationship to patient:  ____________________________________________    Witness: _________________________________________ Date:___________     Physician Signature: _______________________________ Date:___________

## (undated) NOTE — LETTER
Date & Time: 6/10/2022, 5:30 PM  Patient: Jennifer Roque  Encounter Provider(s):    MD Patricia Raman PA-C       To Whom It May Concern:    Cruz Mckeon was seen and treated in our department on 6/10/2022. She should not return to work until cleared by occupational health .     If you have any questions or concerns, please do not hesitate to call.        _____________________________  Physician/APC Signature

## (undated) NOTE — LETTER
Katie Esparza 182  295 [facility] S, 209 [facility]  Authorization for Surgical Operation and Procedure     Date:___________                                                                                                         Time:__________ 4.   Should the need arise during my operation or immediate post-operative period, I also consent to the administration of blood and/or blood products.   Further, I understand that despite careful testing and screening of blood or blood products by susie 8.   I recognize that in the event my procedure results in extended X-Ray/fluoroscopy time, I may develop a skin reaction. 9.  If I have a Do Not Attempt Resuscitation (DNAR) order in place, that status will be suspended while in the operating room, proc 1. IHaley agree to be cared for by an anesthesiologist, who is specially trained to monitor me and give me medicine to put me to sleep or keep me comfortable during my procedure    I understand that my anesthesiologist is not an employee or agent 5. My doctor has explained to me other choices available to me for my care (alternatives).   6. Pregnant Patients (“epidural”):  I understand that the risks of having an epidural (medicine given into my back to help control pain during labor), include itchi

## (undated) NOTE — LETTER
Date & Time: 3/23/2019, 6:45 AM  Patient: Houston Polo  Encounter Provider(s):    Bettye Parra DO       To Whom It May Concern:    Marcus Vallejo was seen and treated in our department on 3/23/2019. She can return to work on 3/24/2019.     If you h

## (undated) NOTE — LETTER
10/28/21    Re: Alfa Enriquez  : 1990      To Whom It May Concern:  Elissayair Enriquez is under my care and I recommend the following accomodations until further notice.   - avoid heavy lifting (greater than 20 lbs)  - allowed to sit as much as possible

## (undated) NOTE — LETTER
[address] [facility], :1990    CONSENT FOR PROCEDURE/SEDATION    1. I authorize the performance upon [address] [place] [address]  the following: Colposcopy with biopsy and Endocervical curettage    2.  I authorize Dr. Marlin Velasquez MD (and whomever is designated as the ____________________________________________    Witness: _________________________________________ Date:___________     Physician Signature: _______________________________ Date:___________

## (undated) NOTE — LETTER
November 25, 2024    Patient: Netta Booth   Date of Visit: 11/25/2024       To Whom It May Concern:    Netta Booth was seen and treated in our emergency department on 11/25/2024. She should not return to work until 11/29 .    If you have any questions or concerns, please don't hesitate to call.       Encounter Provider(s):    Dominic Israel MD

## (undated) NOTE — LETTER
[facility]  Trial of Labor After  Section(TOLAC)  Consent Form    PATIENT NAME: Haley Burton     YOB: 1990  [1] I understand that I have had one or more prior  section(s).   [2] I understand that I have the option of u above information and I understand it. Further, I have been provided with all of the information necessary to make an informed decision.  My physician has described both delivery procedures and has advised me of the risks and benefits of each as defined abo

## (undated) NOTE — ED AVS SNAPSHOT
Arron Wintersmelissa   MRN: [id number]    Department:  [address] Emergency Department in [place]   Date of Visit:  12/11/2018           Disclosure     Insurance plans vary and the physician(s) referred by the ER may not be covered by your plan.  Please contact tell this physician (or your personal doctor if your instructions are to return to your personal doctor) about any new or lasting problems. The primary care or specialist physician will see patients referred from the [facility] Emergency Department.  Chica Jennings

## (undated) NOTE — LETTER
Katie Esparza 182  [address] [facility] [address], 209 [facility]  Authorization for Surgical Operation and Procedure     Date:___________                                                                                                         Time:__________ 4.   Should the need arise during my operation or immediate post-operative period, I also consent to the administration of blood and/or blood products.   Further, I understand that despite careful testing and screening of blood or blood products by susie 8.   I recognize that in the event my procedure results in extended X-Ray/fluoroscopy time, I may develop a skin reaction. 9.  If I have a Do Not Attempt Resuscitation (DNAR) order in place, that status will be suspended while in the operating room, proc 1. Sathya BRANDON agree to be cared for by an anesthesiologist, who is specially trained to monitor me and give me medicine to put me to sleep or keep me comfortable during my procedure    I understand that my anesthesiologist is not an employee or agent 5. My doctor has explained to me other choices available to me for my care (alternatives).   6. Pregnant Patients (“epidural”):  I understand that the risks of having an epidural (medicine given into my back to help control pain during labor), include itchi

## (undated) NOTE — MR AVS SNAPSHOT
[facility] [address]  [facility]  [phone]               Thank you for choosing us for your health care visit with Cleopatra Young PA-C.   We are glad to serve you and happy to provide you Instructions and Information about Your Health    Thank you for choosing Urszula Talamantes PA-C at [place] [address]  To Do: Ubaldo Lancaster  1. Pt to begin medications as prescribed, heat to area, benadryl and topical hydrocortisone cream  2.  Get imaging pharmacist and our office with questions, and to notify us and stop treatment if problems arise, but know that our intention is that the benefits outweigh those potential risks and we strive to make you healthier and to improve your quality of life.     Ref These medications were sent to [place] [address] [address], [address] [facility], [phone], [address] [facility], [address] [place] [id number]     Phone:  [phone]    - Clindamycin HCl 300 MG Caps            MyChart Get your heart pumping – brisk walking, biking, swimming Even 10 minute increments are effective and add up over the week   2 ½ hours per week – spread out over time Use a rodolfo to keep you motivated   Don’t forget strength training with weights and resist

## (undated) NOTE — ED AVS SNAPSHOT
Maxwellchris Litzy   MRN: [id number]    Department:  THE [facility] Emergency Department in [place]   Date of Visit:  10/8/2017           Disclosure     Insurance plans vary and the physician(s) referred by the ER may not be covered by your plan.  Please contact If you have been prescribed any medication(s), please fill your prescription right away and begin taking the medication(s) as directed    If the emergency physician has read X-rays, these will be re-interpreted by a radiologist.  If there is a significant

## (undated) NOTE — LETTER
To Whom It May Concern: This certifies that Tiki Kumar was hospitalized January 31, 2019 until her discharge today February 2,2019.  Please excuse her from work/school for this time and one weel thereafter until follow up with MD. Do not hesitate to riana

## (undated) NOTE — LETTER
[facility]  America Mcneil [address] [facility], [address] [place] [address]    Consent for Operation    Date: __________________    Time: _______________    1.  I authorize the performance upon Medtronic the following operation:                              Primary Raphael videotape. The [facility] will not be responsible for storage or maintenance of this tape. 6. For the purpose of advancing medical education, I consent to the admittance of observers to the Operating Room.     7. I authorize the use of any specimen, organs Signature of Patient:   ___________________________    When the patient is a minor or mentally incompetent to give consent:  Signature of person authorized to consent for patient: ___________________________   Relationship to patient: _____________________ 3. I understand how the anesthesia medicine will help me (benefits). 4. I understand that with any type of anesthesia medicine there are risks:  a.  The most common risks are: nausea, vomiting, sore throat, muscle soreness, damage to my eyes, mouth, or t _____________________________________________________________________________  Witness        Date   Time  I have verified that the signature is that of the patient or patient’s representative, and that it was signed before the procedure    Page 2 of 2

## (undated) NOTE — ED AVS SNAPSHOT
Eliana Amato   MRN: [id number]    Department:  [facility] Emergency Department   Date of Visit:  1/20/2019           Disclosure     Insurance plans vary and the physician(s) referred by the ER may not be covered by your plan.  Please contact your tell this physician (or your personal doctor if your instructions are to return to your personal doctor) about any new or lasting problems. The primary care or specialist physician will see patients referred from the [facility] Emergency Department.  Shaka Zendejas

## (undated) NOTE — MR AVS SNAPSHOT
[address]  [address] [address]  [phone]               Thank you for choosing us for your health care visit with Tiesha Arauz MD.  We are glad to serve you and happy to provide you with this summary of you Reason for Today's Visit     Gyn Problem           Medical Issues Discussed Today     Irregular menstrual cycle    Amenorrhea due to Depo Provera        Patient desires pregnancy          Instructions and Information about Your Health     None      Allergi

## (undated) NOTE — ED AVS SNAPSHOT
Junior Larsen   MRN: [id number]    Department:  [facility] Emergency Department in [place]   Date of Visit:  5/21/2018           Disclosure     Insurance plans vary and the physician(s) referred by the ER may not be covered by your plan.  Please contact tell this physician (or your personal doctor if your instructions are to return to your personal doctor) about any new or lasting problems. The primary care or specialist physician will see patients referred from the [facility] Emergency Department.  Paco Webb